# Patient Record
Sex: MALE | Race: WHITE | NOT HISPANIC OR LATINO | Employment: UNEMPLOYED | URBAN - METROPOLITAN AREA
[De-identification: names, ages, dates, MRNs, and addresses within clinical notes are randomized per-mention and may not be internally consistent; named-entity substitution may affect disease eponyms.]

---

## 2021-05-03 ENCOUNTER — HOSPITAL ENCOUNTER (EMERGENCY)
Facility: CLINIC | Age: 51
Discharge: HOME | End: 2021-05-03
Attending: NURSE PRACTITIONER | Admitting: NURSE PRACTITIONER
Payer: MEDICAID

## 2021-05-03 VITALS
OXYGEN SATURATION: 96 % | WEIGHT: 213 LBS | TEMPERATURE: 98.1 F | DIASTOLIC BLOOD PRESSURE: 123 MMHG | RESPIRATION RATE: 18 BRPM | HEART RATE: 86 BPM | SYSTOLIC BLOOD PRESSURE: 209 MMHG

## 2021-05-03 DIAGNOSIS — I10 HYPERTENSION, UNSPECIFIED TYPE: ICD-10-CM

## 2021-05-03 LAB
ANION GAP SERPL CALCULATED.3IONS-SCNC: 3 MMOL/L (ref 3–14)
BASOPHILS # BLD AUTO: 0.1 10E9/L (ref 0–0.2)
BASOPHILS NFR BLD AUTO: 1 %
BUN SERPL-MCNC: 12 MG/DL (ref 7–30)
CALCIUM SERPL-MCNC: 8.8 MG/DL (ref 8.5–10.1)
CHLORIDE SERPL-SCNC: 109 MMOL/L (ref 94–109)
CO2 SERPL-SCNC: 29 MMOL/L (ref 20–32)
CREAT SERPL-MCNC: 0.89 MG/DL (ref 0.66–1.25)
DIFFERENTIAL METHOD BLD: ABNORMAL
EOSINOPHIL # BLD AUTO: 0.2 10E9/L (ref 0–0.7)
EOSINOPHIL NFR BLD AUTO: 2.9 %
ERYTHROCYTE [DISTWIDTH] IN BLOOD BY AUTOMATED COUNT: 13.2 % (ref 10–15)
GFR SERPL CREATININE-BSD FRML MDRD: >90 ML/MIN/{1.73_M2}
GLUCOSE SERPL-MCNC: 123 MG/DL (ref 70–99)
HCT VFR BLD AUTO: 52.2 % (ref 40–53)
HGB BLD-MCNC: 18.5 G/DL (ref 13.3–17.7)
IMM GRANULOCYTES # BLD: 0 10E9/L (ref 0–0.4)
IMM GRANULOCYTES NFR BLD: 0.2 %
LYMPHOCYTES # BLD AUTO: 3.2 10E9/L (ref 0.8–5.3)
LYMPHOCYTES NFR BLD AUTO: 38.7 %
MCH RBC QN AUTO: 30.7 PG (ref 26.5–33)
MCHC RBC AUTO-ENTMCNC: 35.4 G/DL (ref 31.5–36.5)
MCV RBC AUTO: 87 FL (ref 78–100)
MONOCYTES # BLD AUTO: 0.5 10E9/L (ref 0–1.3)
MONOCYTES NFR BLD AUTO: 5.6 %
NEUTROPHILS # BLD AUTO: 4.2 10E9/L (ref 1.6–8.3)
NEUTROPHILS NFR BLD AUTO: 51.6 %
NRBC # BLD AUTO: 0 10*3/UL
NRBC BLD AUTO-RTO: 0 /100
PLATELET # BLD AUTO: 248 10E9/L (ref 150–450)
POTASSIUM SERPL-SCNC: 3.4 MMOL/L (ref 3.4–5.3)
RBC # BLD AUTO: 6.02 10E12/L (ref 4.4–5.9)
SODIUM SERPL-SCNC: 141 MMOL/L (ref 133–144)
TROPONIN I SERPL-MCNC: <0.015 UG/L (ref 0–0.04)
WBC # BLD AUTO: 8.2 10E9/L (ref 4–11)

## 2021-05-03 PROCEDURE — 80048 BASIC METABOLIC PNL TOTAL CA: CPT | Performed by: NURSE PRACTITIONER

## 2021-05-03 PROCEDURE — 93005 ELECTROCARDIOGRAM TRACING: CPT | Performed by: NURSE PRACTITIONER

## 2021-05-03 PROCEDURE — 99284 EMERGENCY DEPT VISIT MOD MDM: CPT | Mod: 25 | Performed by: NURSE PRACTITIONER

## 2021-05-03 PROCEDURE — 85025 COMPLETE CBC W/AUTO DIFF WBC: CPT | Performed by: NURSE PRACTITIONER

## 2021-05-03 PROCEDURE — 99284 EMERGENCY DEPT VISIT MOD MDM: CPT | Performed by: NURSE PRACTITIONER

## 2021-05-03 PROCEDURE — 93010 ELECTROCARDIOGRAM REPORT: CPT | Performed by: NURSE PRACTITIONER

## 2021-05-03 PROCEDURE — 84484 ASSAY OF TROPONIN QUANT: CPT | Performed by: NURSE PRACTITIONER

## 2021-05-03 RX ORDER — LOSARTAN POTASSIUM AND HYDROCHLOROTHIAZIDE 12.5; 5 MG/1; MG/1
1 TABLET ORAL DAILY
Qty: 30 TABLET | Refills: 0 | Status: SHIPPED | OUTPATIENT
Start: 2021-05-03 | End: 2021-05-03

## 2021-05-03 RX ORDER — LOSARTAN POTASSIUM AND HYDROCHLOROTHIAZIDE 12.5; 5 MG/1; MG/1
1 TABLET ORAL DAILY
Qty: 30 TABLET | Refills: 0 | Status: SHIPPED | OUTPATIENT
Start: 2021-05-03 | End: 2021-05-12

## 2021-05-03 RX ORDER — DILTIAZEM HYDROCHLORIDE 120 MG/1
120 CAPSULE, EXTENDED RELEASE ORAL DAILY
COMMUNITY
End: 2021-05-03

## 2021-05-03 ASSESSMENT — ENCOUNTER SYMPTOMS
FEVER: 0
SHORTNESS OF BREATH: 0
ABDOMINAL PAIN: 0
DIARRHEA: 0
NAUSEA: 0
CONSTIPATION: 0
PALPITATIONS: 0
COUGH: 0
DYSURIA: 0
NEUROLOGICAL NEGATIVE: 1
MUSCULOSKELETAL NEGATIVE: 1
VOMITING: 0
FATIGUE: 0
FLANK PAIN: 0
CHILLS: 0

## 2021-05-03 NOTE — ED PROVIDER NOTES
History     Chief Complaint   Patient presents with     Hypertension     HPI  Salvador Crespo is a 50 year old male with history of Htn who presents to the emergency department for evaluation of hypertension. Patient had an episode 1 week ago of sudden onset of confusion- having a party and lots of people (30+) were at his home and he could not remember why everyone was there and then became very fatigued. He then presented to emergency department in [place], [place]. Per patient he had a normal Head CT scan. He does not know what his blood work showed but states he was given several different blood pressure medications via IV and once his BP was 166 systolic he was sent home. He was prescribed Diltiazem  mg daily which he has been taking. He has been recording his BPs at home (10 times a day) that have continued to range from 160-180s systolic and 90-100s diastolic. He states he feels fine (asymptomatic). Denies chest pain or shortness of breath. Mild headache, but also reports feeling anxious. He presents here stating he is not sure what to do.  He does not have a primary care provider. He was previously living in [place]. Several years ago (7-10 years ago) he states he was told he had 3 mini strokes. He had a normal stress test. He was started on hydrochlorothiazide 25mg and then Lisinopril was added. He felt sick on Lisinopril and was put on Metoprolol and only took a few days of it and was taken off. He has stopped taking all his BP meds 3 years ago when he did not have any insurance.     Allergies:  No Known Allergies    Problem List:    There are no active problems to display for this patient.       Past Medical History:    History reviewed. No pertinent past medical history.    Past Surgical History:    History reviewed. No pertinent surgical history.    Family History:    History reviewed. No pertinent family history.    Social History:  Marital Status:  Single [1]  Social History     Tobacco Use      Smoking status: Current Every Day Smoker     Packs/day: 0.25     Types: Cigarettes     Smokeless tobacco: Never Used   Substance Use Topics     Alcohol use: Not Currently     Drug use: Yes     Types: Marijuana        Medications:    losartan-hydrochlorothiazide (HYZAAR) 50-12.5 MG tablet          Review of Systems   Constitutional: Negative for chills, fatigue and fever.   HENT: Negative for congestion.    Respiratory: Negative for cough and shortness of breath.    Cardiovascular: Negative for chest pain, palpitations and leg swelling.   Gastrointestinal: Negative for abdominal pain, constipation, diarrhea, nausea and vomiting.   Genitourinary: Negative for dysuria, flank pain and urgency.   Musculoskeletal: Negative.    Skin: Negative.    Neurological: Negative.    All other systems reviewed and are negative.      Physical Exam   BP: (!) 211/134  Pulse: 103  Temp: 98.1  F (36.7  C)  Resp: 16  Weight: 96.6 kg (213 lb)  SpO2: 99 %      Physical Exam  Constitutional:       Appearance: Normal appearance.   HENT:      Head: Normocephalic and atraumatic.      Right Ear: Tympanic membrane and ear canal normal.      Left Ear: Tympanic membrane and ear canal normal.      Nose: Nose normal.      Mouth/Throat:      Mouth: Mucous membranes are moist.   Eyes:      General: No scleral icterus.     Conjunctiva/sclera: Conjunctivae normal.   Cardiovascular:      Rate and Rhythm: Regular rhythm. Tachycardia present.      Heart sounds: No murmur.   Pulmonary:      Effort: Pulmonary effort is normal.      Breath sounds: Normal breath sounds.   Abdominal:      General: Bowel sounds are normal. There is no distension.      Palpations: Abdomen is soft.      Tenderness: There is no abdominal tenderness.   Skin:     General: Skin is warm and dry.   Neurological:      General: No focal deficit present.      Mental Status: He is alert and oriented to person, place, and time.         ED Course        Procedures               EKG  Interpretation:      Interpreted by VASYL Alcazar CNP  Time reviewed: 1310  Symptoms at time of EKG: none   Rhythm: normal sinus   Rate: normal  Axis: normal  Ectopy: none  Conduction: normal  ST Segments/ T Waves: No ST-T wave changes  Q Waves: none  Comparison to prior: No old EKG available    Clinical Impression: normal EKG           Results for orders placed or performed during the hospital encounter of 05/03/21 (from the past 24 hour(s))   CBC with platelets differential   Result Value Ref Range    WBC 8.2 4.0 - 11.0 10e9/L    RBC Count 6.02 (H) 4.4 - 5.9 10e12/L    Hemoglobin 18.5 (H) 13.3 - 17.7 g/dL    Hematocrit 52.2 40.0 - 53.0 %    MCV 87 78 - 100 fl    MCH 30.7 26.5 - 33.0 pg    MCHC 35.4 31.5 - 36.5 g/dL    RDW 13.2 10.0 - 15.0 %    Platelet Count 248 150 - 450 10e9/L    Diff Method Automated Method     % Neutrophils 51.6 %    % Lymphocytes 38.7 %    % Monocytes 5.6 %    % Eosinophils 2.9 %    % Basophils 1.0 %    % Immature Granulocytes 0.2 %    Nucleated RBCs 0 0 /100    Absolute Neutrophil 4.2 1.6 - 8.3 10e9/L    Absolute Lymphocytes 3.2 0.8 - 5.3 10e9/L    Absolute Monocytes 0.5 0.0 - 1.3 10e9/L    Absolute Eosinophils 0.2 0.0 - 0.7 10e9/L    Absolute Basophils 0.1 0.0 - 0.2 10e9/L    Abs Immature Granulocytes 0.0 0 - 0.4 10e9/L    Absolute Nucleated RBC 0.0    Basic metabolic panel   Result Value Ref Range    Sodium 141 133 - 144 mmol/L    Potassium 3.4 3.4 - 5.3 mmol/L    Chloride 109 94 - 109 mmol/L    Carbon Dioxide 29 20 - 32 mmol/L    Anion Gap 3 3 - 14 mmol/L    Glucose 123 (H) 70 - 99 mg/dL    Urea Nitrogen 12 7 - 30 mg/dL    Creatinine 0.89 0.66 - 1.25 mg/dL    GFR Estimate >90 >60 mL/min/[1.73_m2]    GFR Estimate If Black >90 >60 mL/min/[1.73_m2]    Calcium 8.8 8.5 - 10.1 mg/dL   Troponin I   Result Value Ref Range    Troponin I ES <0.015 0.000 - 0.045 ug/L       Medications - No data to display    Assessments & Plan (with Medical Decision Making)  50 year old male with  history of Hypertension-not controlled for several years. Presents today for elevated BP, asymptomatic. Stopped taking medications 3 years ago due to financial reasons/no insurance. Pt new to the area and has yet to establish primary care provider.  Evaluated at OSH 1 week ago after he had an episode of confusion. Symptoms resolved and has not returned. He had a normal head CT and labs at OSH and was started on diltiazem  mg daily.  Reports he continues to have elevated blood pressures 180s/100s.   On exam BP is elevated 211/134 on arrival.  Reports feeling anxious, but otherwise denies significant symptoms.  He thinks he might have a mild headache, but this is not persistent.  Denies chest pain.  He had previously been taking hydrochlorothiazide.  He had lisinopril added, but did not tolerate the side effects.  He also was prescribed metoprolol in the past but states he only took a few doses of this.  He is not taking any blood pressure medication for the last 3 years.  Today patient has a normal EKG.  Labs are unremarkable including normal kidney function and negative troponin.  He has remained asymptomatic.  I discussed the need for establishing primary care with patient.  We discussed changing his blood pressure medication regimen.  Given he has no symptoms I did not feel he was an hypertensive urgency or emergency to warrant aggressive management or treatment of his blood pressure in the emergency department today.  No red flags to warrant repeat head imaging.  Patient instructed as follows:  Stop Diltiazem.  Start Hyzaar (Losartan 50 mg/Hydrochlorothiazide 12.5 mg) 1 tablet daily. (start today). This was sent to [facility] in [place].  Follow-up in clinic as soon as possible to establish care.  -- Stop at the registration desk and make appointment on your way out today.  -- I also placed a primary care clinic referral.    Return to the emergency department for chest pain, severe headache, dizziness,  extremity weakness, slurred speech, facial droop, or any other symptoms of concern.    --Follow-up appointment FP clinic for recheck BP and establish care has been made for 5/10/2021 with Dr. Schaeffer.     I have reviewed the nursing notes.    I have reviewed the findings, diagnosis, plan and need for follow up with the patient.      New Prescriptions    LOSARTAN-HYDROCHLOROTHIAZIDE (HYZAAR) 50-12.5 MG TABLET    Take 1 tablet by mouth daily       Final diagnoses:   Hypertension, unspecified type       5/3/2021   [facility] EMERGENCY DEPT     Obi, Sondra Armstrong, VASYL CNP  05/03/21 [id number]

## 2021-05-03 NOTE — DISCHARGE INSTRUCTIONS
Stop Diltiazem.  Start Hyzaar (Losartan 50 mg/Hydrochlorothiazide 12.5 mg) 1 tablet daily. (start today). This was sent to [facility] in [place].  Follow-up in clinic as soon as possible to establish care.  -- Stop at the registration desk and make appointment on your way out today.  -- I also placed a primary care clinic referral.    Return to the emergency department for chest pain, severe headache, dizziness, extremity weakness, slurred speech, facial droop, or any other symptoms of concern.

## 2021-05-12 ENCOUNTER — OFFICE VISIT (OUTPATIENT)
Dept: FAMILY MEDICINE | Facility: CLINIC | Age: 51
End: 2021-05-12
Attending: NURSE PRACTITIONER
Payer: MEDICAID

## 2021-05-12 VITALS
WEIGHT: 210.6 LBS | HEIGHT: 73 IN | OXYGEN SATURATION: 97 % | RESPIRATION RATE: 18 BRPM | SYSTOLIC BLOOD PRESSURE: 158 MMHG | TEMPERATURE: 97.8 F | BODY MASS INDEX: 27.91 KG/M2 | HEART RATE: 108 BPM | DIASTOLIC BLOOD PRESSURE: 102 MMHG

## 2021-05-12 DIAGNOSIS — F17.200 TOBACCO USE DISORDER: ICD-10-CM

## 2021-05-12 DIAGNOSIS — Z86.73 HISTORY OF TIA (TRANSIENT ISCHEMIC ATTACK) AND STROKE: ICD-10-CM

## 2021-05-12 DIAGNOSIS — I10 HYPERTENSION, UNSPECIFIED TYPE: Primary | ICD-10-CM

## 2021-05-12 PROCEDURE — 99203 OFFICE O/P NEW LOW 30 MIN: CPT | Performed by: FAMILY MEDICINE

## 2021-05-12 RX ORDER — LOSARTAN POTASSIUM AND HYDROCHLOROTHIAZIDE 25; 100 MG/1; MG/1
1 TABLET ORAL DAILY
Qty: 30 TABLET | Refills: 1 | Status: SHIPPED | OUTPATIENT
Start: 2021-05-12 | End: 2021-07-02

## 2021-05-12 RX ORDER — ATORVASTATIN CALCIUM 20 MG/1
10 TABLET, FILM COATED ORAL DAILY
Qty: 30 TABLET | Refills: 0 | Status: SHIPPED | OUTPATIENT
Start: 2021-05-12 | End: 2021-06-28

## 2021-05-12 ASSESSMENT — PAIN SCALES - GENERAL: PAINLEVEL: NO PAIN (0)

## 2021-05-12 ASSESSMENT — MIFFLIN-ST. JEOR: SCORE: 1867.33

## 2021-05-12 NOTE — PROGRESS NOTES
Assessment & Plan       ICD-10-CM    1. Hypertension, unspecified type  I10 Primary Care Referral     losartan-hydrochlorothiazide (HYZAAR) 100-25 MG tablet     atorvastatin (LIPITOR) 20 MG tablet   2. History of TIA (transient ischemic attack) and stroke  Z86.73    3. Tobacco use disorder  F17.200      Salvador is here today for high blood pressure follow-up.  He is new to the clinic.  Was on medication for high blood pressure for years but stopped on his own in the last 3 years.  Was told he has TIA multiple times.  His previous care was in [facility], no medical record available to review.  His blood pressure remains to be elevated today.  He is not symptomatic.  He continues to smoke one half a pack a day, cutting down from over a pack a day.  Denied of drugs or alcohol use.  Exam today with no focal neurological deficit.    I discussed with him about the nature of the condition and the potential complications associated with uncontrolled high blood pressure.  I educated him about the goal for the blood pressure as well.  Emphasized on healthy lifestyle modification with exercising, healthy diet and weight loss.  Encouraged to avoid high caffeine/salt intake.  Recommended exercise at least 30 minutes a day, 5 days a week, more as tolerated.  Also recommended Mediterranean diet.      Encouraged to stop smoking.  Smoking cessation aid recommended but declined.  He planning to stop on his own.  Recommended basic labs but he declined for now due to lack of health insurance.  He is trying to get the health insurance, will return for physical and lab works when he has it cover.    Start him on low-dose aspirin and low-dose statin.  Potential side effect discussed.  Increase the Hyzaar to 100/25 milligrams daily.  Encouraged to monitoring his blood pressure closely, call in if it is persistently above 140/90.  Return to the clinic in 7 to 10 days for blood pressure check.  Recommend to bring in his blood  "pressure cuff for comparison.  He still has the metoprolol left at home.    Will try to get the medical record from his previous provider to review.    He was strongly recommended follow-up for physical exam when he has his health insurance.       Tobacco Cessation:   reports that he has been smoking cigarettes. He has been smoking about 0.25 packs per day. He has never used smokeless tobacco.  Tobacco Cessation Action Plan: Information offered: Patient not interested at this time    BMI:   Estimated body mass index is 27.63 kg/m  as calculated from the following:    Height as of this encounter: 1.859 m (6' 1.2\").    Weight as of this encounter: 95.5 kg (210 lb 9.6 oz).   Weight management plan: Discussed healthy diet and exercise guidelines       No follow-ups on file.    Cary Martinez Mai, MD  [facility]    Olena Franco is a 50 year old who presents for the following health issues  accompanied by his spouse:    HPI     New Patient/Transfer of Care  ED/UC Followup:    Facility:  [facility]  Date of visit: 05/03/21  Reason for visit: HTN  Current Status: Patient reports blood pressure is still running high but, he feels fine.          Salvador is here today with his wife for ER follow-up on high blood pressure.  States that he had high blood pressure in the past and was on medication for it.  He took himself of the medication for over 3 years.  About a week ago, while hosting a party of more than 30 people at home, he suddenly developed an episode of confusion that he could not remember why everyone was there and become very fatigued and tired.  He was seen in the ER in [place] initially and head CT scan was normal.  It was noted blood pressure was high.  He was put on diltiazem but his blood pressure remains to be high at home.  No chest pain or shortness of breath.  His headache was minimal at that time which now has resolved.  He was seen in the ER again on 5/3/21 because his blood pressure " "remained to be high at home.  He checked his blood pressure multiple times a day.  In the ER, work-up showed negative/normal troponin, and CBC except hemoglobin was high.  He was discharged home with his Hyzaar 50/12.5; stopped the diltiazem.  He been taking the medications as prescribed.  His blood pressure remained to be elevated as well.  No headache or dizziness.  No acute change in his vision.  No leg swelling, orthopnea, dyspnea.  No chest pain or shortness of breath.    Stated that he was diagnosed with mini stroke 3 times when he was in [place].  He was on multiple medication but took himself off all the medication for over 3 years.  Continues to smoke about half pack per day, cutting down from over a pack a day.  Not taking aspirin or statin.  Not exercising.  Denied of drugs use showed or excessive alcohol intake.    His wife is concerned about his anxiety.  He denied of having a problem with it.  Currently he does not have health insurance and he does not want to address it today.  He is trying to get health insurance and will come back in the near future for physical and follow-up on anxiety once he gets it covered.    Review of Systems   Constitutional, HEENT, cardiovascular, pulmonary, gi and gu systems are negative, except as otherwise noted.      Objective    BP (!) 158/102   Pulse 108   Temp 97.8  F (36.6  C) (Temporal)   Resp 18   Ht 1.859 m (6' 1.2\")   Wt 95.5 kg (210 lb 9.6 oz)   SpO2 97%   BMI 27.63 kg/m    Body mass index is 27.63 kg/m .  Physical Exam   GENERAL: healthy, alert and no distress.  Speaking in full sentences.  NECK: Supple, no lymphadenopathy or thyromegaly.  No JV distention.  No carotid bruits.  RESP: lungs clear to auscultation - no rales, rhonchi or wheezes  CV: regular rate and rhythm, normal S1 S2, no S3 or S4, no murmur, click or rub, no peripheral edema  ABDOMEN: soft, nontender, nondistended, no palpable masses or organomegaly with normal bowel sound.  MS: no " gross musculoskeletal defects noted, no edema.  No focal weakness.  NEURO: Normal strength and tone, mentation intact and speech normal.  Cranial nerves II through XII intact.  DTR +2 throughout.  No focal neurological deficit.  PSYCH: mentation appears normal, affect normal/bright    No results found for any visits on 05/12/21.    Reviewed the medical record from the emergency room.  Labs on 5/3/21showed normal CBC except hemoglobin of 18.5 which is elevated, normal BMP and troponin level.  EKG showed normal sinus rhythm.

## 2021-05-19 ENCOUNTER — NURSE TRIAGE (OUTPATIENT)
Dept: NURSING | Facility: CLINIC | Age: 51
End: 2021-05-19

## 2021-05-19 ENCOUNTER — ALLIED HEALTH/NURSE VISIT (OUTPATIENT)
Dept: FAMILY MEDICINE | Facility: CLINIC | Age: 51
End: 2021-05-19

## 2021-05-19 VITALS — DIASTOLIC BLOOD PRESSURE: 101 MMHG | HEART RATE: 97 BPM | SYSTOLIC BLOOD PRESSURE: 170 MMHG

## 2021-05-19 DIAGNOSIS — I10 HYPERTENSION, UNSPECIFIED TYPE: Primary | ICD-10-CM

## 2021-05-19 PROBLEM — F17.200 TOBACCO USE DISORDER: Status: ACTIVE | Noted: 2021-05-19

## 2021-05-19 PROBLEM — Z86.73 HISTORY OF TIA (TRANSIENT ISCHEMIC ATTACK) AND STROKE: Status: ACTIVE | Noted: 2021-05-19

## 2021-05-19 PROCEDURE — 99207 PR NO CHARGE NURSE ONLY: CPT

## 2021-05-19 NOTE — PROGRESS NOTES
Salvador Crespo is a 50 year old patient who comes in today for a Blood Pressure check.  Initial BP:  BP (!) 170/101   Pulse 97      97  Disposition: provider notified while patient in the clinic and results routed to provider. Patient understands medications will be sent to pharmacy an to continue taking all as prescribe, including losartan.     [facility] for refills .  Merly Tracy MA on 5/19/2021 at 11:54 AM

## 2021-05-19 NOTE — TELEPHONE ENCOUNTER
Girlfriend calling wanting to know the plan.    At this point there is no plan.    Need to wait.    June Sood RN  [facility] Nurse Advisor  2:14 PM  5/19/2021    Additional Information    Caller has medication question only, adult not sick, and triager answers question    Protocols used: MEDICATION QUESTION CALL-A-[place]

## 2021-05-20 ENCOUNTER — NURSE TRIAGE (OUTPATIENT)
Dept: NURSING | Facility: CLINIC | Age: 51
End: 2021-05-20

## 2021-05-20 DIAGNOSIS — I10 HYPERTENSION, UNSPECIFIED TYPE: Primary | ICD-10-CM

## 2021-05-20 RX ORDER — METOPROLOL SUCCINATE 25 MG/1
25 TABLET, EXTENDED RELEASE ORAL DAILY
Qty: 90 TABLET | Refills: 1 | Status: SHIPPED | OUTPATIENT
Start: 2021-05-20 | End: 2021-10-01

## 2021-05-20 NOTE — TELEPHONE ENCOUNTER
Patient returned call to clinic, reporting that he had the wrong dose of Metoprolol at home and will need the Metoprolol sent to [facility] pharmacy.     Spoke with , he would like to give Metoprolol 25mg daily NOT 50mg daily.  Dr. Schaeffer did request prices as patient is paying out of pocket.      Metoprolol extended release 90 day supply will be $24  Metoprolol BID 90 day supply will be $10.    Patient will do extended release and take Metoprolol 25mg daily.     Script sent to [facility].     Lindsay Bah RN

## 2021-05-20 NOTE — TELEPHONE ENCOUNTER
RN triage   Call from pt   Pt has questions about medications--   Pt states he was told that he would get a prescription yesterday -- and pharmacy does not have a new prescription for pt from yesterday    Pt has not picked up prescriptions from last week    Currently pt is taking :    hyzaar  50/ 12.5 --- Q day -- has not started 100/25    Pt has not started atorvastatin     Pt states he has 100 mg metoprolol at home - -that  in 2018 -- has not taken any of these  --- pt states he got nausea in 2018 when he took them     Per clinic note from yesterday -- 50 mg metoprolol was prescribed/advised     Pt needs metoprolol presciption  -- send to [facility] pharmacy   And please clarify dosing for metoprolol     Rayna Steward RN  [facility]  Triage Nurse Advisor        Reason for Disposition    Request for URGENT new prescription or refill of 'essential' medication (i.e., likelihood of harm to patient if not taken) and triager unable to fill per department policy    Protocols used: MEDICATION QUESTION CALL-A-OH

## 2021-05-20 NOTE — PROGRESS NOTES
Please let patient know that I want him to continue with the Hyzaar 100/25 daily and start the Lipitor as soon as possible.      His blood pressure remains to be high.  I recommend him to restart the metoprolol that he still has at home 50 mg daily in addition to the Hyzaar.  Recheck the blood pressure in 10-14 days.     It means that he is now taking 3 pills a day - Hyzaar, Lipitor and metoprolol.  If he needs a refill for metoprolol please let me know.  I recall he told me that he had a lot of metoprolol 50 mg dose left at home and I would recommend to restart it.  Thank you

## 2021-05-26 ENCOUNTER — ALLIED HEALTH/NURSE VISIT (OUTPATIENT)
Dept: FAMILY MEDICINE | Facility: CLINIC | Age: 51
End: 2021-05-26

## 2021-05-26 VITALS — SYSTOLIC BLOOD PRESSURE: 170 MMHG | DIASTOLIC BLOOD PRESSURE: 108 MMHG

## 2021-05-26 DIAGNOSIS — I10 HYPERTENSION, UNSPECIFIED TYPE: Primary | ICD-10-CM

## 2021-05-26 PROCEDURE — 99207 PR NO CHARGE NURSE ONLY: CPT

## 2021-05-26 NOTE — PROGRESS NOTES
Since the last visit last week patient has been taking Lipitor 20 mg (10mg) and Metoprolol 25 mg daily and was suppose to increase his Hyzaar from 50-12.5mg to 100-25 mg but was told to finish the 50-12.5mg first before starting the 100-25mg didn't understand he was suppose to take two of those till gone.     Ernestina [place] MA 5/26/2021      Salvador Crespo is a 50 year old patient who comes in today for a Blood Pressure check.  Initial BP:  BP (!) 170/108      Data Unavailable  Disposition: provider notified while patient in the clinic and results routed to provider    Spoke to Dr Schaeffer as patient wasnt taking meds the way he should have been so he is to take them as prescribed and he is coming back next Wednesday for another BP check.

## 2021-05-27 NOTE — PROGRESS NOTES
I called pt and informed him of the below msg and he stated he mis understood and thought you said to finish his other dose cause he only had like 8 tabs left. He is taking it correctly and has an appt on 6/2 to recheck it.  Tori Briseno MA

## 2021-05-27 NOTE — PROGRESS NOTES
My instruction was clearly stated on 5/19/21 that he needs to take the Hyzaar 100/25, metoprolol and Lipitor.  I am not sure why the message did not get to him and please find out why it was missed.  He needs to be on the Hyzaar 100/25, metoprolol 25 and Lipitor and recheck the blood pressure in a week to 10-day.  Please document clearly the instruction was given to the patient - have him repeat the instruction when this message is given to him.  Thank you

## 2021-06-02 ENCOUNTER — ALLIED HEALTH/NURSE VISIT (OUTPATIENT)
Dept: FAMILY MEDICINE | Facility: CLINIC | Age: 51
End: 2021-06-02

## 2021-06-02 VITALS — SYSTOLIC BLOOD PRESSURE: 142 MMHG | DIASTOLIC BLOOD PRESSURE: 100 MMHG

## 2021-06-02 DIAGNOSIS — I10 HYPERTENSION, UNSPECIFIED TYPE: Primary | ICD-10-CM

## 2021-06-02 PROCEDURE — 99207 PR NO CHARGE NURSE ONLY: CPT

## 2021-06-02 NOTE — PROGRESS NOTES
Salvador Crespo is a 50 year old patient who comes in today for a Blood Pressure check.  Initial BP:  BP (!) 142/100      Data Unavailable  Disposition: provider notified while patient in the clinic spoke to Yelitza as Dr Schaeffer is out this week. Yelitza stated we should give his Hyzaar more time to work. And too follow up with Laury in a few weeks but patient informed me that he doesn't have insurance right now and that's why Laury is doing the Nurse only appts. Patient is hoping he can do an float only appt in a few weeks and not see Laury but if he has to see him he will we just need to let him know after pcp returns to review this. Also patient stated the meds make him feel icky in the mornings Yelitza stated he could take them at night if he wants too patient is aware. unsure what he will do for this and results routed to provider

## 2021-06-06 ENCOUNTER — HEALTH MAINTENANCE LETTER (OUTPATIENT)
Age: 51
End: 2021-06-06

## 2021-06-28 DIAGNOSIS — I10 HYPERTENSION, UNSPECIFIED TYPE: ICD-10-CM

## 2021-06-28 RX ORDER — ATORVASTATIN CALCIUM 20 MG/1
10 TABLET, FILM COATED ORAL DAILY
Qty: 30 TABLET | Refills: 0 | Status: SHIPPED | OUTPATIENT
Start: 2021-06-28 | End: 2021-09-13

## 2021-06-28 NOTE — TELEPHONE ENCOUNTER
Routing refill request to provider for review/approval because:  Labs out of range:  LDL    Lindsay Bah RN

## 2021-06-29 ENCOUNTER — ALLIED HEALTH/NURSE VISIT (OUTPATIENT)
Dept: FAMILY MEDICINE | Facility: CLINIC | Age: 51
End: 2021-06-29

## 2021-06-29 VITALS
TEMPERATURE: 98 F | BODY MASS INDEX: 27.83 KG/M2 | HEIGHT: 73 IN | DIASTOLIC BLOOD PRESSURE: 74 MMHG | RESPIRATION RATE: 18 BRPM | SYSTOLIC BLOOD PRESSURE: 132 MMHG | HEART RATE: 72 BPM | WEIGHT: 210 LBS

## 2021-06-29 DIAGNOSIS — I10 HYPERTENSION, UNSPECIFIED TYPE: Primary | ICD-10-CM

## 2021-06-29 PROCEDURE — 99207 PR NO CHARGE NURSE ONLY: CPT

## 2021-06-29 ASSESSMENT — MIFFLIN-ST. JEOR: SCORE: 1861.43

## 2021-06-29 ASSESSMENT — PAIN SCALES - GENERAL: PAINLEVEL: NO PAIN (0)

## 2021-07-01 DIAGNOSIS — I10 HYPERTENSION, UNSPECIFIED TYPE: ICD-10-CM

## 2021-07-02 RX ORDER — LOSARTAN POTASSIUM AND HYDROCHLOROTHIAZIDE 25; 100 MG/1; MG/1
TABLET ORAL
Qty: 30 TABLET | Refills: 0 | Status: SHIPPED | OUTPATIENT
Start: 2021-07-02 | End: 2021-07-09

## 2021-07-02 RX ORDER — ATORVASTATIN CALCIUM 20 MG/1
TABLET, FILM COATED ORAL
Qty: 30 TABLET | Refills: 0 | OUTPATIENT
Start: 2021-07-02

## 2021-07-02 NOTE — TELEPHONE ENCOUNTER
Prescription approved per [facility] Refill Protocol. Losartan-hydrochlorothiazide, refused atorvastatin as duplicate see script 6/28/2021 dispense 30 tabs no refills.        Indu Barajas RN  [facility]

## 2021-07-09 ENCOUNTER — OFFICE VISIT (OUTPATIENT)
Dept: FAMILY MEDICINE | Facility: CLINIC | Age: 51
End: 2021-07-09
Payer: MEDICAID

## 2021-07-09 VITALS
RESPIRATION RATE: 20 BRPM | SYSTOLIC BLOOD PRESSURE: 152 MMHG | BODY MASS INDEX: 28.41 KG/M2 | HEART RATE: 91 BPM | WEIGHT: 214.4 LBS | OXYGEN SATURATION: 97 % | HEIGHT: 73 IN | TEMPERATURE: 97.9 F | DIASTOLIC BLOOD PRESSURE: 94 MMHG

## 2021-07-09 DIAGNOSIS — Z86.73 HISTORY OF TIA (TRANSIENT ISCHEMIC ATTACK) AND STROKE: ICD-10-CM

## 2021-07-09 DIAGNOSIS — R07.9 CHEST PAIN, UNSPECIFIED TYPE: ICD-10-CM

## 2021-07-09 DIAGNOSIS — I10 HYPERTENSION, UNSPECIFIED TYPE: ICD-10-CM

## 2021-07-09 DIAGNOSIS — Z23 NEED FOR VACCINATION: ICD-10-CM

## 2021-07-09 DIAGNOSIS — N52.9 ERECTILE DYSFUNCTION, UNSPECIFIED ERECTILE DYSFUNCTION TYPE: ICD-10-CM

## 2021-07-09 DIAGNOSIS — H57.9 EYE PRESSURE: ICD-10-CM

## 2021-07-09 DIAGNOSIS — Z00.00 ROUTINE GENERAL MEDICAL EXAMINATION AT A HEALTH CARE FACILITY: Primary | ICD-10-CM

## 2021-07-09 DIAGNOSIS — F17.200 TOBACCO USE DISORDER: ICD-10-CM

## 2021-07-09 DIAGNOSIS — H61.23 BILATERAL IMPACTED CERUMEN: ICD-10-CM

## 2021-07-09 LAB
ALBUMIN SERPL-MCNC: 4 G/DL (ref 3.4–5)
ALP SERPL-CCNC: 86 U/L (ref 40–150)
ALT SERPL W P-5'-P-CCNC: 56 U/L (ref 0–70)
ANION GAP SERPL CALCULATED.3IONS-SCNC: 1 MMOL/L (ref 3–14)
AST SERPL W P-5'-P-CCNC: 21 U/L (ref 0–45)
BILIRUB SERPL-MCNC: 0.6 MG/DL (ref 0.2–1.3)
BUN SERPL-MCNC: 13 MG/DL (ref 7–30)
CALCIUM SERPL-MCNC: 9.2 MG/DL (ref 8.5–10.1)
CHLORIDE SERPL-SCNC: 103 MMOL/L (ref 94–109)
CHOLEST SERPL-MCNC: 152 MG/DL
CO2 SERPL-SCNC: 37 MMOL/L (ref 20–32)
CREAT SERPL-MCNC: 0.95 MG/DL (ref 0.66–1.25)
GFR SERPL CREATININE-BSD FRML MDRD: >90 ML/MIN/{1.73_M2}
GLUCOSE SERPL-MCNC: 121 MG/DL (ref 70–99)
HDLC SERPL-MCNC: 35 MG/DL
LDLC SERPL CALC-MCNC: 81 MG/DL
NONHDLC SERPL-MCNC: 117 MG/DL
POTASSIUM SERPL-SCNC: 3 MMOL/L (ref 3.4–5.3)
PROT SERPL-MCNC: 7.8 G/DL (ref 6.8–8.8)
PSA SERPL-ACNC: 0.73 UG/L (ref 0–4)
SODIUM SERPL-SCNC: 141 MMOL/L (ref 133–144)
TRIGL SERPL-MCNC: 182 MG/DL

## 2021-07-09 PROCEDURE — 80053 COMPREHEN METABOLIC PANEL: CPT | Performed by: FAMILY MEDICINE

## 2021-07-09 PROCEDURE — 99214 OFFICE O/P EST MOD 30 MIN: CPT | Mod: 25 | Performed by: FAMILY MEDICINE

## 2021-07-09 PROCEDURE — 99396 PREV VISIT EST AGE 40-64: CPT | Mod: 25 | Performed by: FAMILY MEDICINE

## 2021-07-09 PROCEDURE — 90715 TDAP VACCINE 7 YRS/> IM: CPT | Performed by: FAMILY MEDICINE

## 2021-07-09 PROCEDURE — G0103 PSA SCREENING: HCPCS | Performed by: FAMILY MEDICINE

## 2021-07-09 PROCEDURE — 86803 HEPATITIS C AB TEST: CPT | Performed by: FAMILY MEDICINE

## 2021-07-09 PROCEDURE — 90472 IMMUNIZATION ADMIN EACH ADD: CPT | Performed by: FAMILY MEDICINE

## 2021-07-09 PROCEDURE — 69209 REMOVE IMPACTED EAR WAX UNI: CPT | Performed by: FAMILY MEDICINE

## 2021-07-09 PROCEDURE — 90732 PPSV23 VACC 2 YRS+ SUBQ/IM: CPT | Performed by: FAMILY MEDICINE

## 2021-07-09 PROCEDURE — 36415 COLL VENOUS BLD VENIPUNCTURE: CPT | Performed by: FAMILY MEDICINE

## 2021-07-09 PROCEDURE — 90471 IMMUNIZATION ADMIN: CPT | Performed by: FAMILY MEDICINE

## 2021-07-09 PROCEDURE — 93000 ELECTROCARDIOGRAM COMPLETE: CPT | Performed by: FAMILY MEDICINE

## 2021-07-09 PROCEDURE — 80061 LIPID PANEL: CPT | Performed by: FAMILY MEDICINE

## 2021-07-09 RX ORDER — SILDENAFIL 25 MG/1
25-50 TABLET, FILM COATED ORAL DAILY PRN
Qty: 30 TABLET | Refills: 3 | Status: SHIPPED | OUTPATIENT
Start: 2021-07-09 | End: 2021-10-01

## 2021-07-09 RX ORDER — AMLODIPINE BESYLATE 5 MG/1
5 TABLET ORAL DAILY
Qty: 30 TABLET | Refills: 1 | Status: SHIPPED | OUTPATIENT
Start: 2021-07-09 | End: 2021-09-08

## 2021-07-09 RX ORDER — LOSARTAN POTASSIUM AND HYDROCHLOROTHIAZIDE 25; 100 MG/1; MG/1
1 TABLET ORAL DAILY
Qty: 90 TABLET | Refills: 1 | Status: SHIPPED | OUTPATIENT
Start: 2021-07-09 | End: 2022-02-21

## 2021-07-09 ASSESSMENT — MIFFLIN-ST. JEOR: SCORE: 1881.26

## 2021-07-09 NOTE — PROGRESS NOTES
"SUBJECTIVE:   CC: Salvador Crespo is an 50 year old male who presents for preventative health visit.       Patient has been advised of split billing requirements and indicates understanding: No  HPI    Hypertension: He presents for follow up of hypertension.  He does check blood pressure  regularly outside of the clinic. Outside blood pressures have been over 140/90. He does not follow a low salt diet.      Migraines:   Since the patient's last clinic visit, headaches are: worsened  The patient is getting headaches:  Daily  He is able to do normal daily activities when he has a migraine.  The patient is taking the following rescue/relief medications:  Ibuprofen (Advil, Motrin) and Tylenol   Patient states \"I get some relief\" from the rescue/relief medications.   The patient is taking the following medications to prevent migraines:  No medications to prevent migraines  In the past 4 weeks, the patient has gone to an Urgent Care or Emergency Room 0 times times due to headaches.     He eats 2-3 servings of fruits and vegetables daily.He consumes 4 sweetened beverage(s) daily.He exercises with enough effort to increase his heart rate 10 to 19 minutes per day.  He exercises with enough effort to increase his heart rate 6 days per week.   He is taking medications regularly.    Salvador is here today for his general physical with several was concerns.    1.  First is to follow-up on his high blood pressure for which he has had for years.  He was off the medication by choice for couple years, but recently restarted and has been taking the Hyzaar and metoprolol with no side effect. Blood pressure at home has been running low 130s-140s/80s.  No headache or dizziness.  No leg swelling, orthopnea or dyspnea.  Been having the chest pain on and off, more noticeable at the end of the day.  Achy pressure pain mainly on the left side that lasts up 20-30 minutes, no radiation to neck shoulder or arm.  Happened randomly, even at rest and " "not worse with exertion.  No associated diaphoresis but at times feels little shortness of breath.  No history of heart disease.  Stated that he has had multiple stress echocardiogram when he was in [place] and they were normal.  Denied of heartburn symptoms.  No coughing.  No history of heart disease although he was told he had an \"enlarged heart with occasional irregular heartbeat\" before.  He thinks the pain is related to his heartburn.    2.  He has a history of TIA multiple times.  Recently he restarted the Lipitor, aspirin and working on controlling his BP.  Still smokes 1/2 pack a day, been cutting back and used to smoke over 1 ppd.  No headache or dizziness.  Been feeling a lot of pressure behind his left eyes which also has been watery.  No runny nose, nasal congestions or sinus pain/pressure.  Rarely, he would feel the pressure radiates up to the left head.  No double vision vision or blurred vision.  Also feels pressure in the left eye with eyes movement at times.  Never been diagnosed with glaucoma.    3.  He also would like something for erectile dysfunction which he has had for several years.  He had trouble with both initiating and maintaining.  It is started affecting his relationship.  Never tried medication before.  Not on nitro or any medication for prostate.    4.  Otherwise, he is doing well. No major medical care or procedure done since the last physical many years ago. No N/V/D/C. No problem with urination or abdominal pain.  Denied of STD risks.  No leg swelling or pain.  Not exercising. Social drinker but no drug use.  Feels safe at home and at work-lives with his wife.  No weight change and denied of being depressed. No other concern today      Today's PHQ-2 Score:   PHQ-2 ( 1999 [employer]) 7/9/2021   Q1: Little interest or pleasure in doing things 0   Q2: Feeling down, depressed or hopeless 0   PHQ-2 Score 0   Q1: Little interest or pleasure in doing things Not at all   Q2: Feeling down, " depressed or hopeless Not at all   PHQ-2 Score 0       Abuse: Current or Past(Physical, Sexual or Emotional)- No  Do you feel safe in your environment? Yes    Have you ever done Advance Care Planning? (For example, a Health Directive, POLST, or a discussion with a medical provider or your loved ones about your wishes): Yes, patient states has an Advance Care Planning document and will bring a copy to the clinic.    Social History     Tobacco Use     Smoking status: Current Every Day Smoker     Packs/day: 0.50     Types: Cigarettes     Smokeless tobacco: Never Used   Substance Use Topics     Alcohol use: Not Currently     If you drink alcohol do you typically have >3 drinks per day or >7 drinks per week? No      No flowsheet data found.    Last PSA: No results found for: PSA    Reviewed orders with patient. Reviewed health maintenance and updated orders accordingly - Yes  BP Readings from Last 3 Encounters:   07/09/21 (!) 152/94   06/29/21 132/74   06/02/21 (!) 142/100    Wt Readings from Last 3 Encounters:   07/09/21 97.3 kg (214 lb 6.4 oz)   06/29/21 95.3 kg (210 lb)   05/12/21 95.5 kg (210 lb 9.6 oz)                  Patient Active Problem List   Diagnosis     Hypertension, unspecified type     History of TIA (transient ischemic attack) and stroke     Tobacco use disorder     Erectile dysfunction, unspecified erectile dysfunction type     Past Surgical History:   Procedure Laterality Date     NO HISTORY OF SURGERY         Social History     Tobacco Use     Smoking status: Current Every Day Smoker     Packs/day: 0.50     Types: Cigarettes     Smokeless tobacco: Never Used   Substance Use Topics     Alcohol use: Not Currently     Family History   Problem Relation Age of Onset     Unknown/Adopted Mother      Unknown/Adopted Father      Unknown/Adopted Maternal Grandmother      Unknown/Adopted Maternal Grandfather      Unknown/Adopted Paternal Grandmother      Unknown/Adopted Paternal Grandfather      Aneurysm Brother  "         at 16     No Known Problems Daughter      No Known Problems Son      No Known Problems Son          Current Outpatient Medications   Medication Sig Dispense Refill     amLODIPine (NORVASC) 5 MG tablet Take 1 tablet (5 mg) by mouth daily 30 tablet 1     aspirin (ASA) 81 MG EC tablet Take 1 tablet (81 mg) by mouth daily 100 tablet 1     atorvastatin (LIPITOR) 20 MG tablet Take 0.5 tablets (10 mg) by mouth daily 30 tablet 0     losartan-hydrochlorothiazide (HYZAAR) 100-25 MG tablet Take 1 tablet by mouth daily 90 tablet 1     metoprolol succinate ER (TOPROL-XL) 25 MG 24 hr tablet Take 1 tablet (25 mg) by mouth daily 90 tablet 1     sildenafil (VIAGRA) 25 MG tablet Take 1-2 tablets (25-50 mg) by mouth daily as needed (sexually activities) 30 tablet 3     No Known Allergies    Reviewed and updated as needed this visit by clinical staff  Tobacco  Allergies  Meds  Problems  Med Hx  Surg Hx  Fam Hx  Soc Hx          Reviewed and updated as needed this visit by Provider  Tobacco  Allergies   Problems  Med Hx  Surg Hx  Fam Hx  Soc Hx       Past Medical History:   Diagnosis Date     Cerebral artery occlusion with cerebral infarction (H)     three TIA     Heart disease     enlarged heart     Hypertension       Past Surgical History:   Procedure Laterality Date     NO HISTORY OF SURGERY         Review of Systems  Please see subjective otherwise the complete review of system was negative     OBJECTIVE:   BP (!) 152/94   Pulse 91   Temp 97.9  F (36.6  C) (Temporal)   Resp 20   Ht 1.854 m (6' 0.99\")   Wt 97.3 kg (214 lb 6.4 oz)   SpO2 97%   BMI 28.29 kg/m      Physical Exam   GENERAL: healthy, alert and no distress  EYES: Eyes grossly normal to inspection, PERRL and conjunctivae and sclerae normal.  No nystagmus.  All 4 visual fields are intact  HENT: Both ears are impacted with cerumen.  Post irrigation exam showed ear canals and TM's normal.  Nares are non-congested. Oropharynx is pink and moist. " No tender with palpation to the sinuses.  NECK: no adenopathy, supple, no lymphadenopathy or thyromegaly.  No tender with palpation to the cervical spine or its paraspinous muscle.  RESP: lungs clear to auscultation - no rales, rhonchi or wheezes  CV: regular rate and rhythm, no murmur.  ABDOMEN: soft, nondistended, nontender, no palpable masses or organomegaly with normal bowel sounds.  MS: no gross musculoskeletal defects noted, no edema.  Walk with no limping, normal gait.  All 4 extremities are equally in strength. Ankle, knees, hips, shoulders, elbows and wrists exams normal.  Normal fine motor skills on fingers.  Back is straight, no lordosis or scoliosis.  No tender with palpation.  SKIN: no suspicious lesions or rashes  NEURO: Normal strength and tone, mentation intact and speech normal.  Cranial nerves II through XII intact, DTR +2 throughout, no focal neurological deficit.  PSYCH: mentation appears normal, affect normal/bright.  Thoughts intact, no hallucination.  No suicidal or homicidal ideation.  LYMPH: no cervical, supraclavicular or axillary adenopathy.   (male): Offered but declined.  He has no concern about it      Diagnostic Test Results:  Labs reviewed in Epic  Results for orders placed or performed in visit on 07/09/21   Hepatitis C Screen Reflex to HCV RNA Quant and Genotype     Status: None   Result Value Ref Range    Hepatitis C Antibody Nonreactive NR^Nonreactive   Lipid panel reflex to direct LDL Fasting     Status: Abnormal   Result Value Ref Range    Cholesterol 152 <200 mg/dL    Triglycerides 182 (H) <150 mg/dL    HDL Cholesterol 35 (L) >39 mg/dL    LDL Cholesterol Calculated 81 <100 mg/dL    Non HDL Cholesterol 117 <130 mg/dL   Comprehensive metabolic panel (BMP + Alb, Alk Phos, ALT, AST, Total. Bili, TP)     Status: Abnormal   Result Value Ref Range    Sodium 141 133 - 144 mmol/L    Potassium 3.0 (L) 3.4 - 5.3 mmol/L    Chloride 103 94 - 109 mmol/L    Carbon Dioxide 37 (H) 20 - 32  mmol/L    Anion Gap 1 (L) 3 - 14 mmol/L    Glucose 121 (H) 70 - 99 mg/dL    Urea Nitrogen 13 7 - 30 mg/dL    Creatinine 0.95 0.66 - 1.25 mg/dL    GFR Estimate >90 >60 mL/min/[1.73_m2]    GFR Estimate If Black >90 >60 mL/min/[1.73_m2]    Calcium 9.2 8.5 - 10.1 mg/dL    Bilirubin Total 0.6 0.2 - 1.3 mg/dL    Albumin 4.0 3.4 - 5.0 g/dL    Protein Total 7.8 6.8 - 8.8 g/dL    Alkaline Phosphatase 86 40 - 150 U/L    ALT 56 0 - 70 U/L    AST 21 0 - 45 U/L   PSA, screen     Status: None   Result Value Ref Range    PSA 0.73 0 - 4 ug/L     EKG: Sinus rhythm with ST depression at lead II and V6.  Unchanged from EKG on May 2021    ASSESSMENT/PLAN:   1. Routine general medical examination at a health care facility  Overall Salvador is doing well.  Received the Tdap and Pneumovax today, declined shingle and Covid vaccination.  Discussed about safety issue, healthy diet, exercising, weight management, good sleeping hygiene, advanced directive care, falling prevention, safe sex and STD prevention, and depression prevention. Recommended daily exercise for at least 30 minutes.  Emphasized on healthy diet with adequate fluid intake and resting. Feels safe .  Follow in 1 year, earlier as needed.  Labs as ordered below.      Discussed with him about options for colon cancer screening which include colonoscopy, Cologuard or FIT test.  Pros and cons of each option discussed.  He preferred FIT test.  He understands that positive FIT test will need further evaluation with colonoscopy.  He also informed that FIT is to be done annually.     Discussed with him about the pros and cons of prostate screening cancer with PSA.  Also educated about the potential false positive which may require unnecessary work-up.  He was informed of negative/normal PSA leve does not rule out prostate cancer completely although it is very unlikely.  He understands and is willing to take the risk.      All of his questions were answered.    - PSA, screen    2.  "Hypertension, unspecified type  Not controlled and his BP is high today.  Been taking the metoprolol and Hyzaar as prescribed.  Comorbidity include TIA, hyperlipidemia and tobacco smoking.  Emphasize on the importance of controlling his blood pressure and educated him about the potential long and short term complications associated with uncontrolled HTN as well as the goal for his blood pressure.  Will continue with the metoprolol and Hyzaar at the current doses.  Added amlodipine.  Potential side effect discussed.  Discussed about healthy diet, daily exercise and weight loss as above.  Encouraged to avoid high salt/caffeine intake.  Lab as ordered.  Encourage to increase physical activities and avoid high salt diet.  Follow up in 1 to 2 weeks for blood pressure check    - Lipid panel reflex to direct LDL Fasting  - Comprehensive metabolic panel (BMP + Alb, Alk Phos, ALT, AST, Total. Bili, TP)  - amLODIPine (NORVASC) 5 MG tablet; Take 1 tablet (5 mg) by mouth daily  Dispense: 30 tablet; Refill: 1  - losartan-hydrochlorothiazide (HYZAAR) 100-25 MG tablet; Take 1 tablet by mouth daily  Dispense: 90 tablet; Refill: 1      3. Chest pain, unspecified type  Been having the chest pain which is quite nonspecific.  EKG showed no acute change or concerned finding.  Multiple risk factors which include high blood pressure, high cholesterol and tobacco smoking.  Stated he was told he had \" enlarged heart with irregular heartbeat occasionally\".  Will send for stress echocardiogram.  Will try to get his blood pressure better control as above.  Encouraged to stop smoking.  Continue with statin and aspirin.  Call in or follow-up if the symptoms persist, get worse or if any concern.    - EKG 12-lead complete w/read - Clinics    4. History of TIA (transient ischemic attack) and stroke  Cholesterol level looked pretty good today.  Will continue with Lipitor and aspirin for long term.  Will try to get the blood pressure better " controlled as above.  He was strongly encouraged to stop smoking.  Symptoms that need to be seen or call in discussed.    - Lipid panel reflex to direct LDL Fasting  - aspirin (ASA) 81 MG EC tablet; Take 1 tablet (81 mg) by mouth daily  Dispense: 100 tablet; Refill: 1  - Comprehensive metabolic panel (BMP + Alb, Alk Phos, ALT, AST, Total. Bili, TP)    5. Eye pressure  His eye exam was pretty normal today.  And concern of glaucoma.  Refer patient to ophthalmology for further evaluation.    6. Erectile dysfunction, unspecified erectile dysfunction type  Discussed with him about the nature of the condition.  He has it for couple years and it is getting worse and is affecting his relationship.  Encouraged to stop smoking.  Denied of excessive alcohol intake.  Not taking nitro or medication for prostate.  Will have him start the Viagra.  Potential side effect discussed.  He wias instructed to go to the ER if develops painful erections or erection the last more than 3 to 4 hours.    - sildenafil (VIAGRA) 25 MG tablet; Take 1-2 tablets (25-50 mg) by mouth daily as needed (sexually activities)  Dispense: 30 tablet; Refill: 3    7. Tobacco use disorder  Salvador has been smoking for years.  Discussed with him about the long and short term consequences of tobacco smoking.  Discussed with him about different options for smoking cessation aids.  He is not ready to quit smoking at this time.  Encourage to let me know when he is ready to quit.  In a mean time, I encourage him to reduce to amount of cigarrets smoke as much as possible.  All of his questions were answered.     8. Bilateral impacted cerumen  The cerumen was successfully irrigated and he tolerated the procedure well.  Avoid Q-Tip.  Inform him that his ear canals can be irritated from the irrigation.  Call in if increase pain, develop drainage or if has any concern.       - REMOVE IMPACTED CERUMEN    9. Need for vaccination        Patient has been advised of split  "billing requirements and indicates understanding: Yes  COUNSELING:   Reviewed preventive health counseling, as reflected in patient instructions       Regular exercise       Healthy diet/nutrition       Vision screening       Immunizations    Vaccinated for: Pneumococcal and TDAP             Aspirin prophylaxis        Alcohol Use        Safe sex practices/STD prevention       Consider Hep C screening for all patients one time for ages 18-79 years       Colon cancer screening       Prostate cancer screening       Osteoporosis prevention/bone health       One time pneumovax for smokers       Advance Care Planning    Estimated body mass index is 28.29 kg/m  as calculated from the following:    Height as of this encounter: 1.854 m (6' 0.99\").    Weight as of this encounter: 97.3 kg (214 lb 6.4 oz).     Weight management plan: Discussed healthy diet and exercise guidelines    He reports that he has been smoking cigarettes. He has been smoking about 0.50 packs per day. He has never used smokeless tobacco.  Tobacco Cessation Action Plan:   Information offered: Patient not interested at this time      Counseling Resources:  ATP IV Guidelines  Pooled Cohorts Equation Calculator  FRAX Risk Assessment  ICSI Preventive Guidelines  Dietary Guidelines for Americans, 2010  USDA's MyPlate  ASA Prophylaxis  Lung CA Screening    Cary Martinez Mai, MD  [facility]  "

## 2021-07-09 NOTE — PROGRESS NOTES
"{2021 PROVIDER CHARTING PREFERENCE:356327}    Subjective     Salvador Crespo is a 50 year old male who presents to clinic today for the following health issues accompanied by his {accompanied to visit:374934}:    Patient also has concerns for lazy eye on the left side.     History of Present Illness       Hypertension: He presents for follow up of hypertension.  He does check blood pressure  regularly outside of the clinic. Outside blood pressures have been over 140/90. He does not follow a low salt diet.     Migraines:   Since the patient's last clinic visit, headaches are: worsened  The patient is getting headaches:  Daily  He is able to do normal daily activities when he has a migraine.  The patient is taking the following rescue/relief medications:  Ibuprofen (Advil, Motrin) and Tylenol   Patient states \"I get some relief\" from the rescue/relief medications.   The patient is taking the following medications to prevent migraines:  No medications to prevent migraines  In the past 4 weeks, the patient has gone to an Urgent Care or Emergency Room 0 times times due to headaches.    He eats 2-3 servings of fruits and vegetables daily.He consumes 4 sweetened beverage(s) daily.He exercises with enough effort to increase his heart rate 10 to 19 minutes per day.  He exercises with enough effort to increase his heart rate 6 days per week.   He is taking medications regularly.       {SUPERLIST (Optional):313723}  {additonal problems for provider to add (Optional):650244}    Review of Systems   {ROS COMP (Optional):956245}      Objective    BP (!) 152/94   Pulse 91   Temp 97.9  F (36.6  C) (Temporal)   Resp 20   Ht 1.854 m (6' 0.99\")   Wt 97.3 kg (214 lb 6.4 oz)   SpO2 97%   BMI 28.29 kg/m     Body mass index is 28.29 kg/m .  Physical Exam   {Exam List (Optional):609169}    {Diagnostic Test Results (Optional):490487}      "

## 2021-07-09 NOTE — PROGRESS NOTES
Prior to immunization administration, verified patients identity using patient s name and date of birth. Please see Immunization Activity for additional information.     Screening Questionnaire for Adult Immunization    Are you sick today?   No   Do you have allergies to medications, food, a vaccine component or latex?   No   Have you ever had a serious reaction after receiving a vaccination?   No   Do you have a long-term health problem with heart, lung, kidney, or metabolic disease (e.g., diabetes), asthma, a blood disorder, no spleen, complement component deficiency, a cochlear implant, or a spinal fluid leak?  Are you on long-term aspirin therapy?   No   Do you have cancer, leukemia, HIV/AIDS, or any other immune system problem?   No   Do you have a parent, brother, or sister with an immune system problem?   No   In the past 3 months, have you taken medications that affect  your immune system, such as prednisone, other steroids, or anticancer drugs; drugs for the treatment of rheumatoid arthritis, Crohn s disease, or psoriasis; or have you had radiation treatments?   No   Have you had a seizure, or a brain or other nervous system problem?   No   During the past year, have you received a transfusion of blood or blood    products, or been given immune (gamma) globulin or antiviral drug?   No   For women: Are you pregnant or is there a chance you could become       pregnant during the next month?   No   Have you received any vaccinations in the past 4 weeks?   No     Immunization questionnaire answers were all negative.        Per orders of Dr. SO, injection of PCV 23 AND TDAP given by Rochelle Hsu CMA. Patient instructed to remain in clinic for 15 minutes afterwards, and to report any adverse reaction to me immediately.       Screening performed by Rochelle Hsu CMA on 7/9/2021 at 2:24 PM.

## 2021-07-09 NOTE — Clinical Note
Please refer him to ophthalmology as soon as possible for pressure pain on the left eye  Please set up for stress echocardiogram (ordered)

## 2021-07-10 PROBLEM — N52.9 ERECTILE DYSFUNCTION, UNSPECIFIED ERECTILE DYSFUNCTION TYPE: Status: ACTIVE | Noted: 2021-07-10

## 2021-07-10 LAB — HCV AB SERPL QL IA: NONREACTIVE

## 2021-07-12 ENCOUNTER — TELEPHONE (OUTPATIENT)
Dept: FAMILY MEDICINE | Facility: CLINIC | Age: 51
End: 2021-07-12

## 2021-07-12 DIAGNOSIS — H57.9 EYE PRESSURE: Primary | ICD-10-CM

## 2021-08-08 ENCOUNTER — NURSE TRIAGE (OUTPATIENT)
Dept: NURSING | Facility: CLINIC | Age: 51
End: 2021-08-08

## 2021-08-08 NOTE — TELEPHONE ENCOUNTER
Pt and his wife are calling.    Appt tomorrow for a stress test. He eats and takes meds in the AM and is wondering if he needs to take his meds and eat before or wait until after.  I advised him to not eat or take anything except water 4 hours before. No caffeine 24 hrs before.  I advised her to contact them when open tomorrow regarding his medications and if he should wait on those.    Reason for Disposition    Question about upcoming scheduled test, no triage required and triager able to answer question    Additional Information    Negative: [1] Caller is not with the adult (patient) AND [2] reporting urgent symptoms    Negative: Lab result questions    Negative: Medication questions    Negative: Caller can't be reached by phone    Negative: Caller has already spoken to PCP or another triager    Negative: RN needs further essential information from caller in order to complete triage    Negative: Requesting regular office appointment    Negative: [1] Caller requesting NON-URGENT health information AND [2] PCP's office is the best resource    Negative: Health Information question, no triage required and triager able to answer question    Negative: General information question, no triage required and triager able to answer question    Protocols used: INFORMATION ONLY CALL - NO TRIAGE-A-    Jayashree Lin, ANIL  [facility] Nurse Advisor  8/8/2021 at 5:46 PM

## 2021-08-11 ENCOUNTER — HOSPITAL ENCOUNTER (OUTPATIENT)
Dept: CARDIOLOGY | Facility: CLINIC | Age: 51
Discharge: HOME | End: 2021-08-11
Attending: FAMILY MEDICINE | Admitting: FAMILY MEDICINE
Payer: MEDICAID

## 2021-08-11 DIAGNOSIS — Z86.73 HISTORY OF TIA (TRANSIENT ISCHEMIC ATTACK) AND STROKE: ICD-10-CM

## 2021-08-11 DIAGNOSIS — I10 HYPERTENSION, UNSPECIFIED TYPE: ICD-10-CM

## 2021-08-11 DIAGNOSIS — R07.9 CHEST PAIN, UNSPECIFIED TYPE: ICD-10-CM

## 2021-08-11 PROCEDURE — 93350 STRESS TTE ONLY: CPT | Mod: 26 | Performed by: INTERNAL MEDICINE

## 2021-08-11 PROCEDURE — 255N000002 HC RX 255 OP 636: Performed by: INTERNAL MEDICINE

## 2021-08-11 PROCEDURE — C8928 TTE W OR W/O FOL W/CON,STRES: HCPCS

## 2021-08-11 PROCEDURE — 93016 CV STRESS TEST SUPVJ ONLY: CPT | Performed by: INTERNAL MEDICINE

## 2021-08-11 PROCEDURE — 93325 DOPPLER ECHO COLOR FLOW MAPG: CPT | Mod: 26 | Performed by: INTERNAL MEDICINE

## 2021-08-11 PROCEDURE — 93321 DOPPLER ECHO F-UP/LMTD STD: CPT | Mod: 26 | Performed by: INTERNAL MEDICINE

## 2021-08-11 PROCEDURE — 93018 CV STRESS TEST I&R ONLY: CPT | Performed by: INTERNAL MEDICINE

## 2021-08-11 PROCEDURE — 93017 CV STRESS TEST TRACING ONLY: CPT

## 2021-08-11 RX ADMIN — HUMAN ALBUMIN MICROSPHERES AND PERFLUTREN 5 ML: 10; .22 INJECTION, SOLUTION INTRAVENOUS at 11:54

## 2021-08-13 ENCOUNTER — TRANSFERRED RECORDS (OUTPATIENT)
Dept: HEALTH INFORMATION MANAGEMENT | Facility: CLINIC | Age: 51
End: 2021-08-13
Payer: MEDICAID

## 2021-08-13 LAB — RETINOPATHY: NORMAL

## 2021-08-13 NOTE — TELEPHONE ENCOUNTER
Patients girlfriend calling states he had to cancel the eye appointment in [place] that the referral was for due to being sick, patient has an appointment today with [facility]. Patient would like a new referral/order for them. Please call to advise

## 2021-09-02 ENCOUNTER — TELEPHONE (OUTPATIENT)
Dept: FAMILY MEDICINE | Facility: CLINIC | Age: 51
End: 2021-09-02

## 2021-09-06 DIAGNOSIS — I10 HYPERTENSION, UNSPECIFIED TYPE: ICD-10-CM

## 2021-09-08 RX ORDER — AMLODIPINE BESYLATE 5 MG/1
TABLET ORAL
Qty: 30 TABLET | Refills: 0 | Status: SHIPPED | OUTPATIENT
Start: 2021-09-08 | End: 2021-10-01

## 2021-09-08 NOTE — TELEPHONE ENCOUNTER
Routing refill request to provider for review/approval because:  Elevated BP on file.     Lindsay Bah RN

## 2021-09-08 NOTE — TELEPHONE ENCOUNTER
1 month supply refilled.  Please stop by to get a blood pressure check and to get lab - BMP (HTN, low potasium)

## 2021-09-11 ENCOUNTER — TELEPHONE (OUTPATIENT)
Dept: FAMILY MEDICINE | Facility: CLINIC | Age: 51
End: 2021-09-11

## 2021-09-11 DIAGNOSIS — H34.9: Primary | ICD-10-CM

## 2021-09-11 DIAGNOSIS — I10 HYPERTENSION, UNSPECIFIED TYPE: ICD-10-CM

## 2021-09-11 NOTE — TELEPHONE ENCOUNTER
Reason for Call:  Other call back    Detailed comments: patient has an appointment on September 22 video with Dr. Schaeffer at [facility].  Medication Atorvastatin out on Monday next week and wondering if can get until this appointment?  Would like to pickup at [facility] in [place].    Also, patient received a referral for an eye appointment.  States that eye provider needs patient to be seen with a Neurologist instead due to possible arteries clogged in eye.  Please contact patient on next steps.  Thank you.    Phone Number Patient can be reached at: Home number on file [phone] (home)    Best Time: any    Can we leave a detailed message on this number? YES    Call taken on 9/11/2021 at 12:07 PM by Danieal Leon

## 2021-09-13 RX ORDER — ATORVASTATIN CALCIUM 20 MG/1
10 TABLET, FILM COATED ORAL DAILY
Qty: 45 TABLET | Refills: 0 | Status: SHIPPED | OUTPATIENT
Start: 2021-09-13 | End: 2021-12-06

## 2021-09-26 ENCOUNTER — HEALTH MAINTENANCE LETTER (OUTPATIENT)
Age: 51
End: 2021-09-26

## 2021-09-28 ENCOUNTER — OFFICE VISIT (OUTPATIENT)
Dept: FAMILY MEDICINE | Facility: CLINIC | Age: 51
End: 2021-09-28
Payer: COMMERCIAL

## 2021-09-28 VITALS
DIASTOLIC BLOOD PRESSURE: 94 MMHG | RESPIRATION RATE: 18 BRPM | SYSTOLIC BLOOD PRESSURE: 132 MMHG | TEMPERATURE: 97.6 F | OXYGEN SATURATION: 96 % | HEIGHT: 73 IN | HEART RATE: 96 BPM | BODY MASS INDEX: 28.1 KG/M2 | WEIGHT: 212 LBS

## 2021-09-28 DIAGNOSIS — F17.200 TOBACCO USE DISORDER: ICD-10-CM

## 2021-09-28 DIAGNOSIS — H57.12 EYE PAIN, LEFT: ICD-10-CM

## 2021-09-28 DIAGNOSIS — Z28.21 COVID-19 VACCINATION REFUSED: ICD-10-CM

## 2021-09-28 DIAGNOSIS — Z28.21 REFUSED INFLUENZA VACCINE: ICD-10-CM

## 2021-09-28 DIAGNOSIS — I10 HYPERTENSION, UNSPECIFIED TYPE: Primary | ICD-10-CM

## 2021-09-28 DIAGNOSIS — R06.81 APNEA: ICD-10-CM

## 2021-09-28 DIAGNOSIS — R68.2 DRY MOUTH: ICD-10-CM

## 2021-09-28 DIAGNOSIS — E11.65 TYPE 2 DIABETES MELLITUS WITH HYPERGLYCEMIA, WITHOUT LONG-TERM CURRENT USE OF INSULIN (H): ICD-10-CM

## 2021-09-28 DIAGNOSIS — Z86.73 HISTORY OF TIA (TRANSIENT ISCHEMIC ATTACK) AND STROKE: ICD-10-CM

## 2021-09-28 LAB
ANION GAP SERPL CALCULATED.3IONS-SCNC: 4 MMOL/L (ref 3–14)
BUN SERPL-MCNC: 20 MG/DL (ref 7–30)
CALCIUM SERPL-MCNC: 8.9 MG/DL (ref 8.5–10.1)
CHLORIDE BLD-SCNC: 103 MMOL/L (ref 94–109)
CO2 SERPL-SCNC: 28 MMOL/L (ref 20–32)
CREAT SERPL-MCNC: 0.83 MG/DL (ref 0.66–1.25)
ERYTHROCYTE [DISTWIDTH] IN BLOOD BY AUTOMATED COUNT: 12.8 % (ref 10–15)
GFR SERPL CREATININE-BSD FRML MDRD: >90 ML/MIN/1.73M2
GLUCOSE BLD-MCNC: 386 MG/DL (ref 70–99)
HBA1C MFR BLD: 9 % (ref 0–5.6)
HCT VFR BLD AUTO: 45.2 % (ref 40–53)
HGB BLD-MCNC: 16.5 G/DL (ref 13.3–17.7)
MCH RBC QN AUTO: 31.9 PG (ref 26.5–33)
MCHC RBC AUTO-ENTMCNC: 36.5 G/DL (ref 31.5–36.5)
MCV RBC AUTO: 87 FL (ref 78–100)
PLATELET # BLD AUTO: 205 10E3/UL (ref 150–450)
POTASSIUM BLD-SCNC: 3.5 MMOL/L (ref 3.4–5.3)
RBC # BLD AUTO: 5.17 10E6/UL (ref 4.4–5.9)
SODIUM SERPL-SCNC: 135 MMOL/L (ref 133–144)
TSH SERPL DL<=0.005 MIU/L-ACNC: 0.74 MU/L (ref 0.4–4)
WBC # BLD AUTO: 7.6 10E3/UL (ref 4–11)

## 2021-09-28 PROCEDURE — 83036 HEMOGLOBIN GLYCOSYLATED A1C: CPT | Performed by: FAMILY MEDICINE

## 2021-09-28 PROCEDURE — 84443 ASSAY THYROID STIM HORMONE: CPT | Performed by: FAMILY MEDICINE

## 2021-09-28 PROCEDURE — 36415 COLL VENOUS BLD VENIPUNCTURE: CPT | Performed by: FAMILY MEDICINE

## 2021-09-28 PROCEDURE — 80048 BASIC METABOLIC PNL TOTAL CA: CPT | Performed by: FAMILY MEDICINE

## 2021-09-28 PROCEDURE — 85027 COMPLETE CBC AUTOMATED: CPT | Performed by: FAMILY MEDICINE

## 2021-09-28 PROCEDURE — 99214 OFFICE O/P EST MOD 30 MIN: CPT | Performed by: FAMILY MEDICINE

## 2021-09-28 ASSESSMENT — PAIN SCALES - GENERAL: PAINLEVEL: NO PAIN (0)

## 2021-09-28 ASSESSMENT — MIFFLIN-ST. JEOR: SCORE: 1870.35

## 2021-09-28 NOTE — Clinical Note
Please let patient know that I sent him for scan to evaluate the blood vessels patency on his neck and his brain.  Please help to set up the CTA for him -ordered    Also encouraged him to increase amlodipine from 5 mg to 7.5 mg which is 1.5 tablet daily.  Return to clinic in about 10 days for blood pressure check.    Would like him to follow-up in the clinic in 3 months for peripheral diabetic follow-up.  Will also get lab done at that time.    thanks

## 2021-09-28 NOTE — PROGRESS NOTES
Assessment & Plan     (I10) Hypertension, unspecified type  (primary encounter diagnosis)  Comment: Blood pressure is minimally elevated today.  Comorbidity include history of stroke and now is diabetes.  He also has smoked tobacco's.  The goal for his blood pressure to be in the 120s/80s.  We will continue with the Hyzaar and metoprolol.  Increase the amlodipine from 5 mg to 7.5 mg daily.  Encouraged to monitor blood pressure closely, call any BP if it persistently above 140/90.   Emphasized on healthy/low salt diet, daily excercise and weight loss.  Avoid high sugar/caffeine intake. Lab as ordered.  Follow up in 10 days for blood pressure check and 6 month for HTN follow up.      (Z86.73) History of TIA (transient ischemic attack) and stroke  Comment: Will continue with the Lipitor and aspirin.  Will need to be on a statin for life.  Will better control his blood pressure as discussed above.  Also will try to better control his new diagnosed diabetic, the goal for hemoglobin A1c is to be less than 7.0.  He was strongly encouraged to stop smoking.  He was seen an ophthalmologist recently for left eye pain and there was some concern of arterial occlusion.  Will sent for CTA to evaluate for patency of the carotic and vertebral artery.      (H57.12) Eye pain, left  Comment: No acute change in his vision.  Encouraged to to work with the ophthalmologist per his recommendation.      (R68.2) Dry mouth  Comment: New onset.  Although he has no history of diabetes, the clinical presentation was suggestive of diabetes.  Labs as ordered which include TSH, BMP, CBC and hemoglobin A1c.  Encouraged to drink a lot of water.    Lab results in the confirm that he is diabetic with a hemoglobin A1c of 9.0.  Please see below for further details.    Plan: TSH with free T4 reflex, Basic metabolic panel         (Ca, Cl, CO2, Creat, Gluc, K, Na, BUN),         Hemoglobin A1c, CBC with platelets            (R06.81) Apnea  Comment: BMI of  27.  Clinical presentation is suggestive of sleep apnea.  Referred him to sleep medicine for further evaluation.     Plan: Sleep ENT Referral      (F17.200) Tobacco use disorder  Comment: He was strongly encouraged to stop smoking.  Comorbidity include high blood pressure, TIA and now diabetes.  He has been smoking for years.  Discussed with him about the long and short term consequences of tobacco smoking.  Discussed with him about different options for smoking cessation aids.  He is not ready to quit smoking at this time.  Encourage to let me know when he is ready to quit.  In a mean time, I encourage him to reduce to amount of cigarrets smoke as much as possible.  All of his questions were answered.      (Z28.21) COVID-19 vaccination refused  Comment: He was strong recommended Covid and flu vaccination but he declined.  Risk and benefit discussed, he is willing to take the risk.      (Z28.21) Refused influenza vaccine  Comment: He was strong recommended Covid and flu vaccination but he declined.  Risk and benefit discussed, he is willing to take the risk.       (E11.65) Type 2 diabetes mellitus with hyperglycemia, without long-term current use of insulin (H)  Comment: Newly diagnosed diabetes with a hemoglobin A1c's of 9.0.  I discussed with him about the nature of the condition over the phone.  Will start him on low-dose Metformin, taper up slowly as tolerated.  Also started on low-dose glipizide.  Referral to diabetic educator for it education.  Encouraged to monitor blood sugar closely.  Emphasized on healthy diet, exercise and weight loss.  Follow-up in 3 months, earlier if any concerns or question.      Return in about 3 months (around 12/28/2021) for dm follow up.    Cary Martinez Mai, MD  [facility] is a 50 year old who presents for the following health issues  accompanied by his spouse:    HPI     Hypertension Follow-up      Do you check your blood pressure  regularly outside of the clinic? Yes     Are you following a low salt diet? No    Are your blood pressures ever more than 140 on the top number (systolic) OR more   than 90 on the bottom number (diastolic), for example 140/90? Yes    Salvador is here today with his wife for several concerns.  First is to follow-up on his high blood pressure for which he takes amlodipine, Hyzaar and metoprolol.  Been taking the medication as prescribed with no side effect.  Not checking his blood pressure at home.  No chest pain or shortness of breath.  No leg swelling, orthopnea or dyspnea.  Not exercising.  Denies of excessive salt/caffeine intake.    Also is known to have TIA multiple times in the past when he was in [place].  Most recent episode was about 6 months ago.  Víctor takitng Lipitor and aspirin as well as controlling his BP.  Been having the right eye pain.  Saw her ophthalmologist recently and there was concern of occlusion in one of the blood vessels. He was referred to another eye specialist by his ophthalmologist.  No acute change in his vision but still has the pain behind the right eye.  Does not recall if he ever had an MRI of his head or neck.  Never seen a neurologist.  No headache or dizziness.  No problem with his speech.  No focal numbness/tingling sensations or pain.  Continues to smoke about half a pack per day, not ready to quit at this time.      Stated in the last couple weeks, hehas been feeling very tired with bad dry mouth.  Been drinking constantly. He believed he had covid infection last month and not sure if it is is related to the Covid infection.  No history of DM.  No change in diet.    Also is concern about sleep apnea.  His wife noted that he has been gasping for air and at time stop breathing while sleeping, especially when he lay on the left side.  Snoring is not too bad. Never been evaluated for sleep apnea before. Been feeling more tire and fatigue as discussed above.  No significant weight  "change.  No other concern.    Review of Systems   Constitutional, HEENT, cardiovascular, pulmonary, gi and gu systems are negative, except as otherwise noted.      Objective    BP (!) 132/94   Pulse 96   Temp 97.6  F (36.4  C) (Temporal)   Resp 18   Ht 1.854 m (6' 0.99\")   Wt 96.2 kg (212 lb)   SpO2 96%   BMI 27.98 kg/m    Body mass index is 27.98 kg/m .  Physical Exam   GENERAL: healthy, alert and no distress.  Speaking in full sentences and speech was easily comprehended  EYES: Eyes grossly normal to inspection, PERRL and conjunctivae and sclerae normal.  No nystagmus.  HENT: ear canals and TM's normal, nose and mouth without ulcers or lesions.  Nares are non-congested. Oropharynx is pink and moist.  No tonsillar hypertrophy, exudate or erythema.  No tender with palpation to the sinuses.   NECK: Supple, no lymphadenopathy or thyromegaly.  No carotid bruits.  RESP: lungs clear to auscultation - no rales, rhonchi or wheezes, good respiratory effort throughout.  CV: regular rate and rhythm, normal S1 S2, no S3 or S4, no murmur, click or rub, no peripheral edema.  ABDOMEN: soft, nontender, distended, no palpable masses organomegaly with normal bowel sound.  MS: no gross musculoskeletal defects noted, no edema.  SKIN: no suspicious lesions or rashes  NEURO: Normal strength and tone, mentation intact and speech normal.  Cranial nerves II through XII are intact.  DTR +2 throughout.  PSYCH: mentation appears normal, affect normal/bright    Results for orders placed or performed in visit on 09/28/21   TSH with free T4 reflex     Status: Normal   Result Value Ref Range    TSH 0.74 0.40 - 4.00 mU/L   Basic metabolic panel  (Ca, Cl, CO2, Creat, Gluc, K, Na, BUN)     Status: Abnormal   Result Value Ref Range    Sodium 135 133 - 144 mmol/L    Potassium 3.5 3.4 - 5.3 mmol/L    Chloride 103 94 - 109 mmol/L    Carbon Dioxide (CO2) 28 20 - 32 mmol/L    Anion Gap 4 3 - 14 mmol/L    Urea Nitrogen 20 7 - 30 mg/dL    Creatinine " 0.83 0.66 - 1.25 mg/dL    Calcium 8.9 8.5 - 10.1 mg/dL    Glucose 386 (H) 70 - 99 mg/dL    GFR Estimate >90 >60 mL/min/1.73m2   Hemoglobin A1c     Status: Abnormal   Result Value Ref Range    Hemoglobin A1C 9.0 (H) 0.0 - 5.6 %   CBC with platelets     Status: Normal   Result Value Ref Range    WBC Count 7.6 4.0 - 11.0 10e3/uL    RBC Count 5.17 4.40 - 5.90 10e6/uL    Hemoglobin 16.5 13.3 - 17.7 g/dL    Hematocrit 45.2 40.0 - 53.0 %    MCV 87 78 - 100 fL    MCH 31.9 26.5 - 33.0 pg    MCHC 36.5 31.5 - 36.5 g/dL    RDW 12.8 10.0 - 15.0 %    Platelet Count 205 150 - 450 10e3/uL

## 2021-09-29 ENCOUNTER — TELEPHONE (OUTPATIENT)
Dept: FAMILY MEDICINE | Facility: CLINIC | Age: 51
End: 2021-09-29

## 2021-09-29 NOTE — TELEPHONE ENCOUNTER
Spoke to patient as there is no consent to communicate on file. Patient and his girlfriend have been on [employer] looking up his test results and are very concerned.   Patient was originally scheduled for 10/08/21 and was rescheduled to 10/01 as that is this providers next day in clinic.   Camille Pulliam CMA on 9/29/2021 at 4:35 PM

## 2021-09-29 NOTE — TELEPHONE ENCOUNTER
----- Message from Cary Martinez Mai, MD sent at 9/28/2021  9:45 PM CDT -----  Please help to set up a follow up apt this week virtually for lab results and treatment.  lulu

## 2021-09-29 NOTE — TELEPHONE ENCOUNTER
Pt's girlfriend would like a call back to discuss lab results with the doctor.     Please call Alee and patient at [phone]. To discuss labs.    Martínez Christiansen RN on 9/29/2021 at 3:50 PM

## 2021-10-01 ENCOUNTER — VIRTUAL VISIT (OUTPATIENT)
Dept: FAMILY MEDICINE | Facility: CLINIC | Age: 51
End: 2021-10-01
Payer: COMMERCIAL

## 2021-10-01 DIAGNOSIS — I10 HYPERTENSION, UNSPECIFIED TYPE: ICD-10-CM

## 2021-10-01 DIAGNOSIS — Z86.73 HISTORY OF TIA (TRANSIENT ISCHEMIC ATTACK) AND STROKE: ICD-10-CM

## 2021-10-01 DIAGNOSIS — N52.9 ERECTILE DYSFUNCTION, UNSPECIFIED ERECTILE DYSFUNCTION TYPE: ICD-10-CM

## 2021-10-01 DIAGNOSIS — E11.65 TYPE 2 DIABETES MELLITUS WITH HYPERGLYCEMIA, WITHOUT LONG-TERM CURRENT USE OF INSULIN (H): Primary | ICD-10-CM

## 2021-10-01 PROCEDURE — 99214 OFFICE O/P EST MOD 30 MIN: CPT | Mod: 95 | Performed by: FAMILY MEDICINE

## 2021-10-01 RX ORDER — LANCETS
EACH MISCELLANEOUS
Qty: 100 EACH | Refills: 3 | Status: SHIPPED | OUTPATIENT
Start: 2021-10-01

## 2021-10-01 RX ORDER — METOPROLOL SUCCINATE 25 MG/1
25 TABLET, EXTENDED RELEASE ORAL DAILY
Qty: 90 TABLET | Refills: 1 | Status: SHIPPED | OUTPATIENT
Start: 2021-10-01 | End: 2022-05-19

## 2021-10-01 RX ORDER — GLIPIZIDE 5 MG/1
5 TABLET, FILM COATED, EXTENDED RELEASE ORAL DAILY
Qty: 30 TABLET | Refills: 1 | Status: SHIPPED | OUTPATIENT
Start: 2021-10-01 | End: 2022-01-25

## 2021-10-01 RX ORDER — SILDENAFIL 100 MG/1
100 TABLET, FILM COATED ORAL DAILY PRN
Qty: 30 TABLET | Refills: 1 | Status: SHIPPED | OUTPATIENT
Start: 2021-10-01 | End: 2022-10-05

## 2021-10-01 RX ORDER — AMLODIPINE BESYLATE 5 MG/1
5 TABLET ORAL DAILY
Qty: 90 TABLET | Refills: 1 | Status: SHIPPED | OUTPATIENT
Start: 2021-10-01 | End: 2021-10-06

## 2021-10-01 RX ORDER — GLUCOSAMINE HCL/CHONDROITIN SU 500-400 MG
CAPSULE ORAL
Qty: 100 EACH | Refills: 3 | Status: SHIPPED | OUTPATIENT
Start: 2021-10-01

## 2021-10-01 NOTE — PROGRESS NOTES
Salvador is a 50 year old who is being evaluated via a billable telephone visit.      What phone number would you like to be contacted at? [phone]  How would you like to obtain your AVS? Peggyhart    Assessment & Plan       ICD-10-CM    1. Type 2 diabetes mellitus with hyperglycemia, without long-term current use of insulin (H)  E11.65 metFORMIN (GLUCOPHAGE) 500 MG tablet     glipiZIDE (GLUCOTROL XL) 5 MG 24 hr tablet     AMB Adult Diabetes Educator Referral   2. Hypertension, unspecified type  I10 amLODIPine (NORVASC) 5 MG tablet     metoprolol succinate ER (TOPROL-XL) 25 MG 24 hr tablet   3. Erectile dysfunction, unspecified erectile dysfunction type  N52.9 sildenafil (VIAGRA) 100 MG tablet   4. History of TIA (transient ischemic attack) and stroke  Z86.73         New diagnose with Hgb A1c of 9.0 couple days ago.  Been having the dry mouth and generally not feeling well.  Known to have high blood pressure and history of TIA.  Smoking 1/2 pack a day.  Never been on medication for diabetes.  Discussed with patient about the nature of the condition and potential complication associated with uncontrolled diabetes.  He was educated about the goal for his fasting blood sugar and HgB A1C.  Encourage him to start monitoring blood sugar 1-2 times a day.  Started him on Metformin 500 mg bid, and will increase its dose as tolerated.  Also start on glipizide XL 5 mg daily.  Side effect discussed and no contraindication identified.  Emphasized on healthy diet, daily exercise and weight loss.  Continue with Hyzaar, Lipitor and aspirin.  Will refer him to diabetic educator and ophthalmology for retinal exam.  F/U in 3 months, earlier as needed.      Will continue with metoprolol, Hyzaar, amlodipine for high blood pressure and Lipitor for TIA.  Will continue with the aspirin as well.  He was strongly encouraged to stop smoking.    27 minutes spent on the date of the encounter doing chart review, patient visit and documentation        Return in about 3 months (around 1/1/2022) for dm follow up.    Cary Martinez Mai, MD  [facility]    Olena Franco is a 50 year old who presents for the following health issues     HPI     Go over lab results     Savlador was seen today for lab result follow-up.  He was seen last week for multiple concerns, including dry mouth.  Labs ordered which showed that he is diabetic.  This is a new diagnosis.  He is being treated for high blood pressure and history of multiple TIA.  Been taking the medications as prescribed.  He is on Hyzaar, metoprolol and amlodipine for high blood pressure and Lipitor for high cholesterol.  He also takes 325 mg aspirin daily.  Currently smoke 1/2 pack/day.  No drugs or alcohol.    He also request to increase the Viagra dose.  Currently take 80 mg with minimal effect.  He is not taking nitro or the prostate medication.    Review of Systems   Constitutional, HEENT, cardiovascular, pulmonary, gi and gu systems are negative, except as otherwise noted.      Objective           Vitals:  No vitals were obtained today due to virtual visit.    Physical Exam   healthy, alert and no distress  PSYCH: Alert and oriented times 3; coherent speech, normal   rate and volume, able to articulate logical thoughts, able   to abstract reason, no tangential thoughts, no hallucinations   or delusions  His affect is normal  RESP: No cough, no audible wheezing, able to talk in full sentences  Remainder of exam unable to be completed due to telephone visits    No results found for any visits on 10/01/21.     Labs on 9/28/21: CBC and THS level were normal.  BMP was normal except of the blood sugar 386.  Hemoglobin A1c was 9.0.            Phone call duration: 13 minutes

## 2021-10-05 ENCOUNTER — TELEPHONE (OUTPATIENT)
Dept: FAMILY MEDICINE | Facility: CLINIC | Age: 51
End: 2021-10-05

## 2021-10-05 DIAGNOSIS — F17.200 TOBACCO USE DISORDER: Primary | ICD-10-CM

## 2021-10-05 NOTE — TELEPHONE ENCOUNTER
Pt girlfriend called in on his behalf. No C2C but I was able to gather her concerns.     Pt hasn't heard what type of scan is needed for his eye from Dr. Schaeffer, would like follow up with this as it's painful.     She also referred to pt's COPD and that he is having an exacerbation of heavier more labored breathing, she also states he is sleeping a lot. She is very concerned but pt refused to go to ED.     Sending to triage to reach out to pt and also Dr. Schaeffer regarding the orders question

## 2021-10-05 NOTE — TELEPHONE ENCOUNTER
Talked to patient over the phone, stated that he is feeling fine.  Overall about the same - feels like not able to take a deep breath.  No chest pain, shortness of breath or wheezing.  No orthopnea or dyspnea.  Been using the rescue inhaler about 3 times a day for a while which has helped.  Continues to smoke but is cutting down.  He does not need to feel due to the need to be seen today.  He has no concern.  Wll have him continue with the current medication.  Hold off on the glipizide for 10 days then restarted.  Continue with the Metformin.  Also start him on the Spiriva inhaler.  Cary Schaeffer MD.

## 2021-10-07 ENCOUNTER — ALLIED HEALTH/NURSE VISIT (OUTPATIENT)
Dept: EDUCATION SERVICES | Facility: CLINIC | Age: 51
End: 2021-10-07
Attending: FAMILY MEDICINE
Payer: COMMERCIAL

## 2021-10-07 ENCOUNTER — TELEPHONE (OUTPATIENT)
Dept: FAMILY MEDICINE | Facility: CLINIC | Age: 51
End: 2021-10-07

## 2021-10-07 DIAGNOSIS — E11.65 TYPE 2 DIABETES MELLITUS WITH HYPERGLYCEMIA, WITHOUT LONG-TERM CURRENT USE OF INSULIN (H): ICD-10-CM

## 2021-10-07 PROCEDURE — G0108 DIAB MANAGE TRN  PER INDIV: HCPCS

## 2021-10-07 NOTE — PROGRESS NOTES
Before diabetes and COVID was having more sweets and soda, 6-10 cans/day. Now 2 cans/day.   Dinners : meat,     3 mini strokes over the years.   Has not started glipizide or metformin.         Diabetes Self-Management Education & Support    Presents for: Initial Assessment for new diagnosis    Type of Visit: In Person    How would patient like to obtain AVS? MyChart    ASSESSMENT:    Patient is confused about how blood sugars went from being okay to so high. Seems to have gone out of control after having COVID. He has not started metformin yet. Pharmacy needed clarification on dosing. Set him up with meter today. He has cut out a lot of sugar so is doing much better in that area. BG today is 415 at 2.5 hours after breakfast of 2 eggs.     Patient's most recent   Lab Results   Component Value Date    A1C 9.0 09/28/2021    is not meeting goal of <7.0    Diabetes knowledge and skills assessment:   Patient is knowledgeable in diabetes management concepts related to: Healthy Eating    Continue education with the following diabetes management concepts: Healthy Eating, Being Active, Monitoring, Taking Medication, Problem Solving, Reducing Risks and Healthy Coping    Based on learning assessment above, most appropriate setting for further diabetes education would be: Individual setting.    INTERVENTIONS:    Education provided today on:  AADE Self-Care Behaviors:  Healthy Eating: portion control and reducing added sugars, what to drink  Monitoring: purpose, proper technique, log and interpret results, individual blood glucose targets and frequency of monitoring  Taking Medication: action of prescribed medication, side effects of prescribed medications and when to take medications  Reducing Risks: A1C - goals, relating to blood glucose levels, how often to check  Healthy Coping: recognize feelings about diagnosis and benefits of making appropriate lifestyle changes    Opportunities for ongoing education and support in  "diabetes-self management were discussed. Pt verbalized understanding of concepts discussed and recommendations provided today.       Education Materials Provided:  [facility] Understanding Diabetes Booklet and Accu-Chek Guide Me meter kit          PLAN    See instructions  Topics to cover at upcoming visits: Healthy Eating, Taking Medication, Reducing Risks and review numbers  Follow-up: 11/5    See Goals Section for co-developed, patient-stated behavior change goals.  AVS provided to patient today.          SUBJECTIVE / OBJECTIVE:  Presents for: Initial Assessment for new diagnosis  Accompanied by: Self  Diabetes education in the past 24mo: No  Focus of Visit: Monitoring, Healthy Eating, Taking Medication  Diabetes type: Type 2  Date of diagnosis: September 2021  Cultural Influences/Ethnic Background:  Not  or       Diabetes Symptoms & Complications:  Fatigue: Yes  Neuropathy: No  Polydipsia: Yes  Polyphagia: Sometimes  Polyuria: Yes  Visual change: Sometimes  Weight trend: Decreasing  Complications assessed today?: No    Patient Problem List and Family Medical History reviewed for relevant medical history, current medical status, and diabetes risk factors.    Vitals:  There were no vitals taken for this visit.  Estimated body mass index is 27.98 kg/m  as calculated from the following:    Height as of 9/28/21: 1.854 m (6' 0.99\").    Weight as of 9/28/21: 96.2 kg (212 lb).   Last 3 BP:   BP Readings from Last 3 Encounters:   09/28/21 (!) 132/94   07/09/21 (!) 152/94   06/29/21 132/74       History   Smoking Status     Current Every Day Smoker     Packs/day: 0.50     Types: Cigarettes   Smokeless Tobacco     Never Used       Labs:  Lab Results   Component Value Date    A1C 9.0 09/28/2021     Lab Results   Component Value Date     09/28/2021     07/09/2021     Lab Results   Component Value Date    LDL 81 07/09/2021     HDL Cholesterol   Date Value Ref Range Status   07/09/2021 35 (L) " >39 mg/dL Final   ]  GFR Estimate   Date Value Ref Range Status   09/28/2021 >90 >60 mL/min/1.73m2 Final     Comment:     As of July 11, 2021, eGFR is calculated by the CKD-EPI creatinine equation, without race adjustment. eGFR can be influenced by muscle mass, exercise, and diet. The reported eGFR is an estimation only and is only applicable if the renal function is stable.   07/09/2021 >90 >60 mL/min/[1.73_m2] Final     Comment:     Non  GFR Calc  Starting 12/18/2018, serum creatinine based estimated GFR (eGFR) will be   calculated using the Chronic Kidney Disease Epidemiology Collaboration   (CKD-EPI) equation.       GFR Estimate If Black   Date Value Ref Range Status   07/09/2021 >90 >60 mL/min/[1.73_m2] Final     Comment:      GFR Calc  Starting 12/18/2018, serum creatinine based estimated GFR (eGFR) will be   calculated using the Chronic Kidney Disease Epidemiology Collaboration   (CKD-EPI) equation.       Lab Results   Component Value Date    CR 0.83 09/28/2021    CR 0.95 07/09/2021     No results found for: MICROALBUMIN    Healthy Eating:  Healthy Eating Assessed Today: Yes  Meal planning/habits: Avoiding sweets  Meals include: Breakfast, Dinner, Afternoon Snack  Breakfast: has to eat breakfast for BP med. has 3 eggs with parmesan cheese. no toast or antonio/sausage, or breakfast bowl. quit coffee  Lunch: string cheese or beef jerky.  processed meat makes stomach sick.  Dinner: steak, chicken with vegetables steamed frozen mixed vegetables  Snacks: beef jerky. likes to make Rice Krispies.  Other: was drinking juice to quit soda.  drinking more water now.  Beverages: Water, Soda    Being Active:  Being Active Assessed Today: No    Monitoring:  Monitoring Assessed Today: Yes  Times checking blood sugar at home (number): Never        Taking Medications:  Diabetes Medication(s)     Biguanides       metFORMIN (GLUCOPHAGE) 500 MG tablet    Take 1 tablet bid with meals for 2 weeks then  increase to 2 tablets twice daily     Patient not taking: Reported on 10/7/2021    Sulfonylureas       glipiZIDE (GLUCOTROL XL) 5 MG 24 hr tablet    Take 1 tablet (5 mg) by mouth daily     Patient not taking: Reported on 10/7/2021          Taking Medication Assessed Today: Yes  Current Treatments: Oral Medication (taken by mouth)    Problem Solving:  Problem Solving Assessed Today: No      Reducing Risks:  Reducing Risks Assessed Today: Yes  CAD Risks: Diabetes Mellitus, Dyslipidemia, Male sex, Hypertension, Family history, Tobacco exposure    Healthy Coping:  Healthy Coping Assessed Today: Yes  Emotional response to diabetes: Ready to learn, Acceptance  Informal Support system:: Family  Stage of change: ACTION (Actively working towards change)  Patient Activation Measure Survey Score:  No flowsheet data found.          Layne Branch RDN, JUVENTINO, NOA    Time Spent: 90 minutes  Encounter Type: Individual        Any diabetes medication dose changes were made via the Certified Diabetes Care & Education Protocol in collaboration with the patient's primary care provider. A copy of this encounter was shared with the provider.

## 2021-10-07 NOTE — PATIENT INSTRUCTIONS
Diabetes Plan:     Start metformin 1 daily with supper for first week.   2nd week: 1 tab with breakfast and supper.   3rd week: 1 tab with breakfast and 2 with supper.   4th week: 2 tabs with breakfast and supper.     Check blood sugar before breakfast and then one other time of day maybe starting with 2 hours after supper.   Goal fasting is eventually is . The after meal goal would be <180.     Let us know if blood sugars are not trending down. [employer] message or call [phone] for diabetes ed dept.

## 2021-10-07 NOTE — LETTER
10/7/2021         RE: Salvador Crespo  [address] [facility] [address]  [place] [address]        Dear Colleague,    Thank you for referring your patient, Salvador Crespo, to the [facility] [place]. Please see a copy of my visit note below.    Before diabetes and COVID was having more sweets and soda, 6-10 cans/day. Now 2 cans/day.   Dinners : meat,     3 mini strokes over the years.   Has not started glipizide or metformin.         Diabetes Self-Management Education & Support    Presents for: Initial Assessment for new diagnosis    Type of Visit: In Person    How would patient like to obtain AVS? MyChart    ASSESSMENT:    Patient is confused about how blood sugars went from being okay to so high. Seems to have gone out of control after having COVID. He has not started metformin yet. Pharmacy needed clarification on dosing. Set him up with meter today. He has cut out a lot of sugar so is doing much better in that area. BG today is 415 at 2.5 hours after breakfast of 2 eggs.     Patient's most recent   Lab Results   Component Value Date    A1C 9.0 09/28/2021    is not meeting goal of <7.0    Diabetes knowledge and skills assessment:   Patient is knowledgeable in diabetes management concepts related to: Healthy Eating    Continue education with the following diabetes management concepts: Healthy Eating, Being Active, Monitoring, Taking Medication, Problem Solving, Reducing Risks and Healthy Coping    Based on learning assessment above, most appropriate setting for further diabetes education would be: Individual setting.    INTERVENTIONS:    Education provided today on:  AADE Self-Care Behaviors:  Healthy Eating: portion control and reducing added sugars, what to drink  Monitoring: purpose, proper technique, log and interpret results, individual blood glucose targets and frequency of monitoring  Taking Medication: action of prescribed medication, side effects of prescribed medications and when to take  "medications  Reducing Risks: A1C - goals, relating to blood glucose levels, how often to check  Healthy Coping: recognize feelings about diagnosis and benefits of making appropriate lifestyle changes    Opportunities for ongoing education and support in diabetes-self management were discussed. Pt verbalized understanding of concepts discussed and recommendations provided today.       Education Materials Provided:   [employer] Understanding Diabetes Booklet and Accu-Chek Guide Me meter kit          PLAN    See instructions  Topics to cover at upcoming visits: Healthy Eating, Taking Medication, Reducing Risks and review numbers  Follow-up: 11/5    See Goals Section for co-developed, patient-stated behavior change goals.  AVS provided to patient today.          SUBJECTIVE / OBJECTIVE:  Presents for: Initial Assessment for new diagnosis  Accompanied by: Self  Diabetes education in the past 24mo: No  Focus of Visit: Monitoring, Healthy Eating, Taking Medication  Diabetes type: Type 2  Date of diagnosis: September 2021  Cultural Influences/Ethnic Background:  Not  or       Diabetes Symptoms & Complications:  Fatigue: Yes  Neuropathy: No  Polydipsia: Yes  Polyphagia: Sometimes  Polyuria: Yes  Visual change: Sometimes  Weight trend: Decreasing  Complications assessed today?: No    Patient Problem List and Family Medical History reviewed for relevant medical history, current medical status, and diabetes risk factors.    Vitals:  There were no vitals taken for this visit.  Estimated body mass index is 27.98 kg/m  as calculated from the following:    Height as of 9/28/21: 1.854 m (6' 0.99\").    Weight as of 9/28/21: 96.2 kg (212 lb).   Last 3 BP:   BP Readings from Last 3 Encounters:   09/28/21 (!) 132/94   07/09/21 (!) 152/94   06/29/21 132/74       History   Smoking Status     Current Every Day Smoker     Packs/day: 0.50     Types: Cigarettes   Smokeless Tobacco     Never Used       Labs:  Lab Results "   Component Value Date    A1C 9.0 09/28/2021     Lab Results   Component Value Date     09/28/2021     07/09/2021     Lab Results   Component Value Date    LDL 81 07/09/2021     HDL Cholesterol   Date Value Ref Range Status   07/09/2021 35 (L) >39 mg/dL Final   ]  GFR Estimate   Date Value Ref Range Status   09/28/2021 >90 >60 mL/min/1.73m2 Final     Comment:     As of July 11, 2021, eGFR is calculated by the CKD-EPI creatinine equation, without race adjustment. eGFR can be influenced by muscle mass, exercise, and diet. The reported eGFR is an estimation only and is only applicable if the renal function is stable.   07/09/2021 >90 >60 mL/min/[1.73_m2] Final     Comment:     Non  GFR Calc  Starting 12/18/2018, serum creatinine based estimated GFR (eGFR) will be   calculated using the Chronic Kidney Disease Epidemiology Collaboration   (CKD-EPI) equation.       GFR Estimate If Black   Date Value Ref Range Status   07/09/2021 >90 >60 mL/min/[1.73_m2] Final     Comment:      GFR Calc  Starting 12/18/2018, serum creatinine based estimated GFR (eGFR) will be   calculated using the Chronic Kidney Disease Epidemiology Collaboration   (CKD-EPI) equation.       Lab Results   Component Value Date    CR 0.83 09/28/2021    CR 0.95 07/09/2021     No results found for: MICROALBUMIN    Healthy Eating:  Healthy Eating Assessed Today: Yes  Meal planning/habits: Avoiding sweets  Meals include: Breakfast, Dinner, Afternoon Snack  Breakfast: has to eat breakfast for BP med. has 3 eggs with parmesan cheese. no toast or antonio/sausage, or breakfast bowl. quit coffee  Lunch: string cheese or beef jerky.  processed meat makes stomach sick.  Dinner: steak, chicken with vegetables steamed frozen mixed vegetables  Snacks: beef jerky. likes to make Rice Krispies.  Other: was drinking juice to quit soda.  drinking more water now.  Beverages: Water, Soda    Being Active:  Being Active Assessed Today:  No    Monitoring:  Monitoring Assessed Today: Yes  Times checking blood sugar at home (number): Never        Taking Medications:  Diabetes Medication(s)     Biguanides       metFORMIN (GLUCOPHAGE) 500 MG tablet    Take 1 tablet bid with meals for 2 weeks then increase to 2 tablets twice daily     Patient not taking: Reported on 10/7/2021    Sulfonylureas       glipiZIDE (GLUCOTROL XL) 5 MG 24 hr tablet    Take 1 tablet (5 mg) by mouth daily     Patient not taking: Reported on 10/7/2021          Taking Medication Assessed Today: Yes  Current Treatments: Oral Medication (taken by mouth)    Problem Solving:  Problem Solving Assessed Today: No      Reducing Risks:  Reducing Risks Assessed Today: Yes  CAD Risks: Diabetes Mellitus, Dyslipidemia, Male sex, Hypertension, Family history, Tobacco exposure    Healthy Coping:  Healthy Coping Assessed Today: Yes  Emotional response to diabetes: Ready to learn, Acceptance  Informal Support system:: Family  Stage of change: ACTION (Actively working towards change)  Patient Activation Measure Survey Score:  No flowsheet data found.          Layne Branch RDN, JUVENTINO, CDCES    Time Spent: 90 minutes  Encounter Type: Individual        Any diabetes medication dose changes were made via the Certified Diabetes Care & Education Protocol in collaboration with the patient's primary care provider. A copy of this encounter was shared with the provider.

## 2021-10-07 NOTE — TELEPHONE ENCOUNTER
Please clarify the Metformin rx,  Should it read take 1 tablet twice daily for two weeks and increase to 2 tablets twice daily  As of now the rx reads take 1 tablet twice daily for two weeks then increase to 2 tablets daily so that is not an increase or change.     Please call and clarify or send new script please.

## 2021-10-12 ENCOUNTER — HOSPITAL ENCOUNTER (OUTPATIENT)
Dept: CT IMAGING | Facility: CLINIC | Age: 51
Discharge: HOME | End: 2021-10-12
Attending: FAMILY MEDICINE | Admitting: FAMILY MEDICINE
Payer: COMMERCIAL

## 2021-10-12 DIAGNOSIS — Z86.73 HISTORY OF TIA (TRANSIENT ISCHEMIC ATTACK) AND STROKE: ICD-10-CM

## 2021-10-12 DIAGNOSIS — H57.12 EYE PAIN, LEFT: ICD-10-CM

## 2021-10-12 PROCEDURE — 250N000011 HC RX IP 250 OP 636: Performed by: FAMILY MEDICINE

## 2021-10-12 PROCEDURE — 250N000009 HC RX 250: Performed by: FAMILY MEDICINE

## 2021-10-12 PROCEDURE — 70496 CT ANGIOGRAPHY HEAD: CPT

## 2021-10-12 RX ORDER — IOPAMIDOL 755 MG/ML
500 INJECTION, SOLUTION INTRAVASCULAR ONCE
Status: COMPLETED | OUTPATIENT
Start: 2021-10-12 | End: 2021-10-12

## 2021-10-12 RX ADMIN — SODIUM CHLORIDE 99 ML: 9 INJECTION, SOLUTION INTRAVENOUS at 13:14

## 2021-10-12 RX ADMIN — IOPAMIDOL 70 ML: 755 INJECTION, SOLUTION INTRAVENOUS at 13:14

## 2021-10-13 DIAGNOSIS — R93.89 ABNORMAL COMPUTED TOMOGRAPHY ANGIOGRAPHY (CTA) OF NECK: ICD-10-CM

## 2021-10-13 DIAGNOSIS — Z86.73 HISTORY OF TIA (TRANSIENT ISCHEMIC ATTACK) AND STROKE: Primary | ICD-10-CM

## 2021-10-20 ENCOUNTER — HOSPITAL ENCOUNTER (OUTPATIENT)
Dept: MRI IMAGING | Facility: CLINIC | Age: 51
End: 2021-10-20
Attending: FAMILY MEDICINE
Payer: COMMERCIAL

## 2021-10-20 DIAGNOSIS — Z86.73 HISTORY OF TIA (TRANSIENT ISCHEMIC ATTACK) AND STROKE: ICD-10-CM

## 2021-10-20 DIAGNOSIS — R93.89 ABNORMAL COMPUTED TOMOGRAPHY ANGIOGRAPHY (CTA) OF NECK: ICD-10-CM

## 2021-10-20 PROCEDURE — A9585 GADOBUTROL INJECTION: HCPCS | Performed by: FAMILY MEDICINE

## 2021-10-20 PROCEDURE — 255N000002 HC RX 255 OP 636: Performed by: FAMILY MEDICINE

## 2021-10-20 PROCEDURE — 70553 MRI BRAIN STEM W/O & W/DYE: CPT

## 2021-10-20 PROCEDURE — 70549 MR ANGIOGRAPH NECK W/O&W/DYE: CPT

## 2021-10-20 PROCEDURE — 250N000009 HC RX 250: Performed by: FAMILY MEDICINE

## 2021-10-20 RX ORDER — GADOBUTROL 604.72 MG/ML
10 INJECTION INTRAVENOUS ONCE
Status: COMPLETED | OUTPATIENT
Start: 2021-10-20 | End: 2021-10-20

## 2021-10-20 RX ADMIN — SODIUM CHLORIDE 30 ML: 9 INJECTION, SOLUTION INTRAVENOUS at 13:37

## 2021-10-20 RX ADMIN — GADOBUTROL 10 ML: 604.72 INJECTION INTRAVENOUS at 13:36

## 2021-11-01 ENCOUNTER — TRANSFERRED RECORDS (OUTPATIENT)
Dept: MULTI SPECIALTY CLINIC | Facility: CLINIC | Age: 51
End: 2021-11-01
Payer: COMMERCIAL

## 2021-11-05 ENCOUNTER — TELEPHONE (OUTPATIENT)
Dept: EDUCATION SERVICES | Facility: OTHER | Age: 51
End: 2021-11-05

## 2021-11-05 ENCOUNTER — VIRTUAL VISIT (OUTPATIENT)
Dept: EDUCATION SERVICES | Facility: OTHER | Age: 51
End: 2021-11-05
Payer: COMMERCIAL

## 2021-11-05 DIAGNOSIS — E11.65 TYPE 2 DIABETES MELLITUS WITH HYPERGLYCEMIA, WITHOUT LONG-TERM CURRENT USE OF INSULIN (H): Primary | ICD-10-CM

## 2021-11-05 PROCEDURE — G0108 DIAB MANAGE TRN  PER INDIV: HCPCS | Performed by: DIETITIAN, REGISTERED

## 2021-11-05 NOTE — LETTER
"    11/5/2021         RE: Salvador Crespo  [address] [facility] [address]  [place] [address]        Dear Colleague,    Thank you for referring your patient, Salvador Crespo, to the [facility]. Please see a copy of my visit note below.    Diabetes Self-Management Education & Support    Presents for: Follow-up    SUBJECTIVE/OBJECTIVE:  Presents for: Follow-up  Accompanied by: Self  Diabetes education in the past 24mo: Yes  Focus of Visit: Monitoring, Healthy Eating, Taking Medication, Diabetes Pathophysiology  Diabetes type: Type 2  Date of diagnosis: September 2021  Disease course: Improving  How confident are you filling out medical forms by yourself:: Not Assessed  Difficulty affording diabetes medication?: No  Difficulty affording diabetes testing supplies?: No  Other concerns:: None  Cultural Influences/Ethnic Background:  Not  or     Diabetes Symptoms & Complications:  Fatigue: Yes  Neuropathy: No  Polydipsia: No (Dry mouth - but says on lots of new meds recently)  Polyphagia: No  Polyuria: No  Visual change: Yes (Thinks may be related to stroke)  Symptom course: Improving  Weight trend: Stable  Complications assessed today?: No    Patient Problem List and Family Medical History reviewed for relevant medical history, current medical status, and diabetes risk factors.    Vitals:  There were no vitals taken for this visit.  Estimated body mass index is 27.98 kg/m  as calculated from the following:    Height as of 9/28/21: 1.854 m (6' 0.99\").    Weight as of 9/28/21: 96.2 kg (212 lb).   Last 3 BP:   BP Readings from Last 3 Encounters:   09/28/21 (!) 132/94   07/09/21 (!) 152/94   06/29/21 132/74       History   Smoking Status     Current Every Day Smoker     Packs/day: 0.50     Types: Cigarettes   Smokeless Tobacco     Never Used       Labs:  Lab Results   Component Value Date    A1C 9.0 09/28/2021     Lab Results   Component Value Date     09/28/2021     07/09/2021     Lab " Results   Component Value Date    LDL 81 07/09/2021     HDL Cholesterol   Date Value Ref Range Status   07/09/2021 35 (L) >39 mg/dL Final   ]  GFR Estimate   Date Value Ref Range Status   09/28/2021 >90 >60 mL/min/1.73m2 Final     Comment:     As of July 11, 2021, eGFR is calculated by the CKD-EPI creatinine equation, without race adjustment. eGFR can be influenced by muscle mass, exercise, and diet. The reported eGFR is an estimation only and is only applicable if the renal function is stable.   07/09/2021 >90 >60 mL/min/[1.73_m2] Final     Comment:     Non  GFR Calc  Starting 12/18/2018, serum creatinine based estimated GFR (eGFR) will be   calculated using the Chronic Kidney Disease Epidemiology Collaboration   (CKD-EPI) equation.       GFR Estimate If Black   Date Value Ref Range Status   07/09/2021 >90 >60 mL/min/[1.73_m2] Final     Comment:      GFR Calc  Starting 12/18/2018, serum creatinine based estimated GFR (eGFR) will be   calculated using the Chronic Kidney Disease Epidemiology Collaboration   (CKD-EPI) equation.       Lab Results   Component Value Date    CR 0.83 09/28/2021    CR 0.95 07/09/2021     No results found for: MICROALBUMIN    Healthy Eating:  Healthy Eating Assessed Today: No  Meal planning/habits: Avoiding sweets  Meals include: Breakfast, Dinner, Afternoon Snack  Breakfast: has to eat breakfast for BP med. has 3 eggs with parmesan cheese. no toast or antonio/sausage, or breakfast bowl. quit coffee  Lunch: string cheese or beef jerky.  processed meat makes stomach sick.  Dinner: steak, chicken with vegetables steamed frozen mixed vegetables  Snacks: beef jerky. likes to make Rice Krispies.  Other: was drinking juice to quit soda.  drinking more water now.  Beverages: Water  Has patient met with a dietitian in the past?: Yes    Being Active:  Being Active Assessed Today: No    Monitoring:  Monitoring Assessed Today: Yes  Did patient bring glucose meter to  appointment? : Yes  Times checking blood sugar at home (number): 2  Times checking blood sugar at home (per): Day  Blood glucose trend: Decreasing    Date Breakfast  Lunch  Dinner  Bedtime    Before After Before After Before After    11/5 142         11/4 140     127    11/3 128     113    11/2 135     118    11/1 145     123    10/31 145     136    10/30 140     115          14 days on full metformin dose   Down to half soda; now smoking more- gained a little back from COVID.  Lost 10-12# with COVID.    Taking Medications:  Diabetes Medication(s)     Biguanides       metFORMIN (GLUCOPHAGE) 500 MG tablet    Take 1 tablet bid with meals for 2 weeks then increase to 2 tablets twice daily     Patient not taking: Reported on 10/7/2021    Sulfonylureas       glipiZIDE (GLUCOTROL XL) 5 MG 24 hr tablet    Take 1 tablet (5 mg) by mouth daily     Patient not taking: Reported on 10/7/2021          Taking Medication Assessed Today: Yes  Current Treatments: Oral Medication (taken by mouth)  Problems taking diabetes medications regularly?: No  Diabetes medication side effects?: Yes (nausea)    Problem Solving:  Problem Solving Assessed Today: Yes  Is the patient at risk for hypoglycemia?: No              Reducing Risks:  Reducing Risks Assessed Today: Yes  Diabetes Risks: Age over 45 years, Family History  CAD Risks: Diabetes Mellitus, Dyslipidemia, Male sex, Hypertension, Family history, Tobacco exposure  Has dilated eye exam at least once a year?: No (Has appointment coming up)    Healthy Coping:  Healthy Coping Assessed Today: Yes  Emotional response to diabetes: Ready to learn, Acceptance  Informal Support system:: Family  Stage of change: ACTION (Actively working towards change)  Patient Activation Measure Survey Score:  No flowsheet data found.    Diabetes knowledge and skills assessment:   Patient is knowledgeable in diabetes management concepts related to: Monitoring and Taking Medication    Patient needs further  education on the following diabetes management concepts: Healthy Eating, Monitoring and Taking Medication    Based on learning assessment above, most appropriate setting for further diabetes education would be: Individual setting.      INTERVENTIONS:    Education provided today on:  AADE Self-Care Behaviors:  Diabetes Pathophysiology  Healthy Eating: carbohydrate counting, portion control, plate planning method and label reading  Being Active: describe appropriate activity program  Monitoring: individual blood glucose targets and frequency of monitoring  Taking Medication: action of prescribed medication and when to take medications    Opportunities for ongoing education and support in diabetes-self management were discussed.    Pt verbalized understanding of concepts discussed and recommendations provided today.       Education Materials Provided:  No new materials provided today      ASSESSMENT:  Pt doing well overall.  Blood sugars are coming down with the exception of still some elevated in the morning.  Pt has been taking 2000 mg metformin for approx 14 days.  Likely fasting blood sugars will continue to improve.  Pt never did start Glipizide XL as pt states he was told by provider not to.  Reviewed topics related to new dx education.  Discussed adding a snack at night for 5 days and 5 days without to see if that helps fasting blood sugars.  Pt would also like to try the extended release version of metformin as having some nausea with regular release.  Order pended to PCP.  Plan to follow up in about 1 month.        Patient's most recent   Lab Results   Component Value Date    A1C 9.0 09/28/2021    is not meeting goal of <7.0    PLAN  See Patient Instructions for co-developed, patient-stated behavior change goals.  AVS printed and provided to patient today. See Follow-Up section for recommended follow-up.      Time Spent: 60 minutes  Encounter Type: Individual    Any diabetes medication dose changes were made  via the CDE Protocol and Collaborative Practice Agreement with the patient's referring provider. A copy of this encounter was shared with the provider.

## 2021-11-05 NOTE — TELEPHONE ENCOUNTER
Dr Schaeffer,    Pt feeling nauseated from the regular metformin.  He would like to try the extended release version.  I've pended the order please sign if in agreement and discontinue regular metformin.    Thank you.    Kerri Solano RD [facility]  [phone]

## 2021-11-05 NOTE — PROGRESS NOTES
"Diabetes Self-Management Education & Support    Presents for: Follow-up    SUBJECTIVE/OBJECTIVE:  Presents for: Follow-up  Accompanied by: Self  Diabetes education in the past 24mo: Yes  Focus of Visit: Monitoring, Healthy Eating, Taking Medication, Diabetes Pathophysiology  Diabetes type: Type 2  Date of diagnosis: September 2021  Disease course: Improving  How confident are you filling out medical forms by yourself:: Not Assessed  Difficulty affording diabetes medication?: No  Difficulty affording diabetes testing supplies?: No  Other concerns:: None  Cultural Influences/Ethnic Background:  Not  or     Diabetes Symptoms & Complications:  Fatigue: Yes  Neuropathy: No  Polydipsia: No (Dry mouth - but says on lots of new meds recently)  Polyphagia: No  Polyuria: No  Visual change: Yes (Thinks may be related to stroke)  Symptom course: Improving  Weight trend: Stable  Complications assessed today?: No    Patient Problem List and Family Medical History reviewed for relevant medical history, current medical status, and diabetes risk factors.    Vitals:  There were no vitals taken for this visit.  Estimated body mass index is 27.98 kg/m  as calculated from the following:    Height as of 9/28/21: 1.854 m (6' 0.99\").    Weight as of 9/28/21: 96.2 kg (212 lb).   Last 3 BP:   BP Readings from Last 3 Encounters:   09/28/21 (!) 132/94   07/09/21 (!) 152/94   06/29/21 132/74       History   Smoking Status     Current Every Day Smoker     Packs/day: 0.50     Types: Cigarettes   Smokeless Tobacco     Never Used       Labs:  Lab Results   Component Value Date    A1C 9.0 09/28/2021     Lab Results   Component Value Date     09/28/2021     07/09/2021     Lab Results   Component Value Date    LDL 81 07/09/2021     HDL Cholesterol   Date Value Ref Range Status   07/09/2021 35 (L) >39 mg/dL Final   ]  GFR Estimate   Date Value Ref Range Status   09/28/2021 >90 >60 mL/min/1.73m2 Final     Comment:     As of " July 11, 2021, eGFR is calculated by the CKD-EPI creatinine equation, without race adjustment. eGFR can be influenced by muscle mass, exercise, and diet. The reported eGFR is an estimation only and is only applicable if the renal function is stable.   07/09/2021 >90 >60 mL/min/[1.73_m2] Final     Comment:     Non  GFR Calc  Starting 12/18/2018, serum creatinine based estimated GFR (eGFR) will be   calculated using the Chronic Kidney Disease Epidemiology Collaboration   (CKD-EPI) equation.       GFR Estimate If Black   Date Value Ref Range Status   07/09/2021 >90 >60 mL/min/[1.73_m2] Final     Comment:      GFR Calc  Starting 12/18/2018, serum creatinine based estimated GFR (eGFR) will be   calculated using the Chronic Kidney Disease Epidemiology Collaboration   (CKD-EPI) equation.       Lab Results   Component Value Date    CR 0.83 09/28/2021    CR 0.95 07/09/2021     No results found for: MICROALBUMIN    Healthy Eating:  Healthy Eating Assessed Today: No  Meal planning/habits: Avoiding sweets  Meals include: Breakfast, Dinner, Afternoon Snack  Breakfast: has to eat breakfast for BP med. has 3 eggs with parmesan cheese. no toast or antonio/sausage, or breakfast bowl. quit coffee  Lunch: string cheese or beef jerky.  processed meat makes stomach sick.  Dinner: steak, chicken with vegetables steamed frozen mixed vegetables  Snacks: beef jerky. likes to make Rice Krispies.  Other: was drinking juice to quit soda.  drinking more water now.  Beverages: Water  Has patient met with a dietitian in the past?: Yes    Being Active:  Being Active Assessed Today: No    Monitoring:  Monitoring Assessed Today: Yes  Did patient bring glucose meter to appointment? : Yes  Times checking blood sugar at home (number): 2  Times checking blood sugar at home (per): Day  Blood glucose trend: Decreasing    Date Breakfast  Lunch  Dinner  Bedtime    Before After Before After Before After    11/5 142         11/4  140     127    11/3 128     113    11/2 135     118    11/1 145     123    10/31 145     136    10/30 140     115          14 days on full metformin dose   Down to half soda; now smoking more- gained a little back from COVID.  Lost 10-12# with COVID.    Taking Medications:  Diabetes Medication(s)     Biguanides       metFORMIN (GLUCOPHAGE) 500 MG tablet    Take 1 tablet bid with meals for 2 weeks then increase to 2 tablets twice daily     Patient not taking: Reported on 10/7/2021    Sulfonylureas       glipiZIDE (GLUCOTROL XL) 5 MG 24 hr tablet    Take 1 tablet (5 mg) by mouth daily     Patient not taking: Reported on 10/7/2021          Taking Medication Assessed Today: Yes  Current Treatments: Oral Medication (taken by mouth)  Problems taking diabetes medications regularly?: No  Diabetes medication side effects?: Yes (nausea)    Problem Solving:  Problem Solving Assessed Today: Yes  Is the patient at risk for hypoglycemia?: No              Reducing Risks:  Reducing Risks Assessed Today: Yes  Diabetes Risks: Age over 45 years, Family History  CAD Risks: Diabetes Mellitus, Dyslipidemia, Male sex, Hypertension, Family history, Tobacco exposure  Has dilated eye exam at least once a year?: No (Has appointment coming up)    Healthy Coping:  Healthy Coping Assessed Today: Yes  Emotional response to diabetes: Ready to learn, Acceptance  Informal Support system:: Family  Stage of change: ACTION (Actively working towards change)  Patient Activation Measure Survey Score:  No flowsheet data found.    Diabetes knowledge and skills assessment:   Patient is knowledgeable in diabetes management concepts related to: Monitoring and Taking Medication    Patient needs further education on the following diabetes management concepts: Healthy Eating, Monitoring and Taking Medication    Based on learning assessment above, most appropriate setting for further diabetes education would be: Individual setting.      INTERVENTIONS:    Education  "provided today on:  AADE Self-Care Behaviors:  Diabetes Pathophysiology  Healthy Eating: carbohydrate counting, portion control, plate planning method and label reading  Being Active: describe appropriate activity program  Monitoring: individual blood glucose targets and frequency of monitoring  Taking Medication: action of prescribed medication and when to take medications    Opportunities for ongoing education and support in diabetes-self management were discussed.    Pt verbalized understanding of concepts discussed and recommendations provided today.       Education Materials Provided:  No new materials provided today      ASSESSMENT:  Pt doing well overall.  Blood sugars are coming down with the exception of still some elevated in the morning.  Pt has been taking 2000 mg metformin for approx 14 days.  Likely fasting blood sugars will continue to improve.  Pt never did start Glipizide XL as pt states he was told by provider not to.  Reviewed topics related to new dx education.  Discussed adding a snack at night for 5 days and 5 days without to see if that helps fasting blood sugars.  Pt would also like to try the extended release version of metformin as having some nausea with regular release.  Order pended to PCP.  Plan to follow up in about 1 month.    Of note, pt mentions since reducing pop and food, he is smoking more.  When asked if he would like some assistance with quitting, pt mentions he is not sure if he is really ready to quit at this time.  Says he doesn't feel it's an addiction, but more of a \"habit.\"  Feels it's more a motivation thing more than anything.  Declines need for help with this.  Let him know help is available if he decides he wants to quit - encouraged to speak with his Dr.  Also provided national quit line website.  Pt appreciative of information.        Patient's most recent   Lab Results   Component Value Date    A1C 9.0 09/28/2021    is not meeting goal of <7.0    PLAN  See Patient " Instructions for co-developed, patient-stated behavior change goals.  AVS printed and provided to patient today. See Follow-Up section for recommended follow-up.      Time Spent: 60 minutes  Encounter Type: Individual    Any diabetes medication dose changes were made via the CDE Protocol and Collaborative Practice Agreement with the patient's referring provider. A copy of this encounter was shared with the provider.

## 2021-11-05 NOTE — PATIENT INSTRUCTIONS
1).  45-75 grams of carb per meal; 15-30 grams per snack  2).  Continue to check your blood sugars at least 2 times per day.  Fasting, before meals, 2 hours after start of a meal ( before meals; less than 180 (2) hours after start of a meal)    3).  I will send you a message to your Dr about changing to extended release metformin    4).  You can try a snack before bed with a small amount of carbohydrate 15-20 grams of carbohydrate with protein and see if this helps your fasting blood sugars.

## 2021-11-08 RX ORDER — METFORMIN HCL 500 MG
TABLET, EXTENDED RELEASE 24 HR ORAL
Qty: 120 TABLET | Refills: 0 | Status: SHIPPED | OUTPATIENT
Start: 2021-11-08 | End: 2021-12-06

## 2021-11-08 NOTE — TELEPHONE ENCOUNTER
Kerri Solano, RD  Cary Schaeffer MD; [facility] 3 days ago     KB    Change to extended release metformin    Routing comment

## 2021-11-09 DIAGNOSIS — I10 HYPERTENSION, UNSPECIFIED TYPE: ICD-10-CM

## 2021-11-09 NOTE — TELEPHONE ENCOUNTER
Called and updated pt that metformin xr has been sent into [facility] to be filled.    Kerri Solano RD [facility]  [phone]

## 2021-11-10 RX ORDER — METOPROLOL SUCCINATE 25 MG/1
TABLET, EXTENDED RELEASE ORAL
Qty: 90 TABLET | Refills: 0 | OUTPATIENT
Start: 2021-11-10

## 2021-11-10 NOTE — TELEPHONE ENCOUNTER
Toprol XL  Denied, sent 10/1/2021 for 90 tablets and 1 refill to this pharmacy    Kathleen Nunez RN

## 2021-11-30 ENCOUNTER — ALLIED HEALTH/NURSE VISIT (OUTPATIENT)
Dept: EDUCATION SERVICES | Facility: OTHER | Age: 51
End: 2021-11-30
Payer: COMMERCIAL

## 2021-11-30 DIAGNOSIS — E11.65 TYPE 2 DIABETES MELLITUS WITH HYPERGLYCEMIA, WITHOUT LONG-TERM CURRENT USE OF INSULIN (H): Primary | ICD-10-CM

## 2021-11-30 PROCEDURE — G0108 DIAB MANAGE TRN  PER INDIV: HCPCS | Performed by: DIETITIAN, REGISTERED

## 2021-11-30 NOTE — PATIENT INSTRUCTIONS
1).  Make follow up with Dr Schaeffer for Diabetes Visit end of December/early January  2).  Keep on your daily walks!  Great job!  3).  God job on reducing your smoking : )  4).  45-60 grams of carb per meal; 0-25 grams per snack.  Occasionally up to 75 grams of carb per meal    Can also try adding fiber using sugar free metamucil (or KONSYL brand (target) is just Psyllium husk, no art sweetner) or benefiber      5).  Keep taking your 4 tablets of extended release metformin per day

## 2021-11-30 NOTE — PROGRESS NOTES
"Diabetes Self-Management Education & Support    Presents for:      SUBJECTIVE/OBJECTIVE:  Diabetes education in the past 24mo: Yes  Diabetes type: Type 2  Disease course: Improving  Cultural Influences/Ethnic Background:  Not  or       Diabetes Symptoms & Complications:  Fatigue: Yes  Neuropathy: No  Polydipsia: Sometimes  Polyphagia: Sometimes  Polyuria: Yes  Visual change: Sometimes  Slow healing wounds: No  Autonomic neuropathy: No  CVA: Other  Heart disease: No  Nephropathy: No  Peripheral neuropathy: No  Peripheral Vascular Disease: No  Retinopathy: Yes  Sexual dysfunction: Other    Patient Problem List and Family Medical History reviewed for relevant medical history, current medical status, and diabetes risk factors.    Vitals:  There were no vitals taken for this visit.  Estimated body mass index is 27.98 kg/m  as calculated from the following:    Height as of 9/28/21: 1.854 m (6' 0.99\").    Weight as of 9/28/21: 96.2 kg (212 lb).   Last 3 BP:   BP Readings from Last 3 Encounters:   09/28/21 (!) 132/94   07/09/21 (!) 152/94   06/29/21 132/74       History   Smoking Status     Current Every Day Smoker     Packs/day: 0.50     Types: Cigarettes   Smokeless Tobacco     Never Used       Labs:  Lab Results   Component Value Date    A1C 9.0 09/28/2021     Lab Results   Component Value Date     09/28/2021     07/09/2021     Lab Results   Component Value Date    LDL 81 07/09/2021     HDL Cholesterol   Date Value Ref Range Status   07/09/2021 35 (L) >39 mg/dL Final   ]  GFR Estimate   Date Value Ref Range Status   09/28/2021 >90 >60 mL/min/1.73m2 Final     Comment:     As of July 11, 2021, eGFR is calculated by the CKD-EPI creatinine equation, without race adjustment. eGFR can be influenced by muscle mass, exercise, and diet. The reported eGFR is an estimation only and is only applicable if the renal function is stable.   07/09/2021 >90 >60 mL/min/[1.73_m2] Final     Comment:     Non  " "American GFR Calc  Starting 12/18/2018, serum creatinine based estimated GFR (eGFR) will be   calculated using the Chronic Kidney Disease Epidemiology Collaboration   (CKD-EPI) equation.       GFR Estimate If Black   Date Value Ref Range Status   07/09/2021 >90 >60 mL/min/[1.73_m2] Final     Comment:      GFR Calc  Starting 12/18/2018, serum creatinine based estimated GFR (eGFR) will be   calculated using the Chronic Kidney Disease Epidemiology Collaboration   (CKD-EPI) equation.       Lab Results   Component Value Date    CR 0.83 09/28/2021    CR 0.95 07/09/2021     No results found for: MICROALBUMIN    Healthy Eating:  Healthy Eating Assessed Today: Yes  Cultural/Tenriism diet restrictions?: No  Meal planning/habits: Carb counting,Smaller portions  How many times a week on average do you eat food made away from home (restaurant/take-out)?: 1  Meals include: Breakfast,Dinner,Evening Snack  Breakfast: 0900 Eggs (every morning) 2, sandwich, or toast (2); \"sick of eggs.\" Not a fan of oatmeal.  Does not drink milk.  Could do cottage and fruit; Oat or sourdough bread.  Wants to do [place] toast for breakfast.  Lunch: 9505-0115 doesn't typically eat this meal.  Will get sliced meat from grocery store -aware it is high in sodium.  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  Goes for a walk after dinner around [place])  Days per week of moderate to strenuous exercise (like a brisk walk): 7  On average, minutes per day of exercise at this level: 30  How intense was your typical " exercise? : Moderate (like brisk walking)  Exercise Minutes per Week: 210  Barrier to exercise: None    Monitoring:  Monitoring Assessed Today: Yes  Did patient bring glucose meter to appointment? : Yes  Blood Glucose Meter: Accu-chek  Times checking blood sugar at home (number): 2  Times checking blood sugar at home (per): Day  Blood glucose trend: Decreasing    Date Breakfast  Lunch  Dinner  Bedtime    Before After Before After Before After    11/30 144         11/29 134     98* 3 hours    11/28 124     113    11/27 156     165    11/26 139     131    11/25 149     152    11/24 145     112      Date Breakfast  Lunch  Dinner  Bedtime    Before After Before After Before After    11/23 127     108    11/22 137     115    11/21 154     129    11/20 131     133    11/19 130     139    11/18 126     149    11/17 129     106          Taking Medications:  Diabetes Medication(s)     Biguanides       metFORMIN (GLUCOPHAGE-XR) 500 MG 24 hr tablet    Take 2 tablets with breakfast and 2 tablets with dinner meal    Sulfonylureas       glipiZIDE (GLUCOTROL XL) 5 MG 24 hr tablet    Take 1 tablet (5 mg) by mouth daily     Patient not taking: Reported on 10/7/2021          Taking Medication Assessed Today: Yes  Current Treatments: Oral Medication (taken by mouth)  Problems taking diabetes medications regularly?: No  Diabetes medication side effects?: Yes (feels hot in head and stomach nir upset, no diarrhea.  Noticed better on XR, but still there.  wants to keep on it)    Problem Solving:  Problem Solving Assessed Today: Yes  Is the patient at risk for hypoglycemia?: No              Reducing Risks:  Reducing Risks Assessed Today: Yes  Diabetes Risks: Age over 45 years,Family History  CAD Risks: Diabetes Mellitus,Hypertension,Tobacco exposure,Male sex  Has dilated eye exam at least once a year?: Yes  Sees dentist every 6 months?: No  Feet checked by healthcare provider in the last year?: Yes    Healthy Coping:  Healthy Coping  Assessed Today: Yes  Emotional response to diabetes: Ready to learn,Acceptance  Informal Support system:: Family,Friends,Significant other  Stage of change: ACTION (Actively working towards change)  Patient Activation Measure Survey Score:  No flowsheet data found.    Diabetes knowledge and skills assessment:   Patient is knowledgeable in diabetes management concepts related to: Monitoring and Taking Medication    Patient needs further education on the following diabetes management concepts: Healthy Eating and Monitoring    Based on learning assessment above, most appropriate setting for further diabetes education would be: Individual setting.      INTERVENTIONS:    Education provided today on:  AADE Self-Care Behaviors:  Diabetes Pathophysiology  Healthy Eating: carbohydrate counting, consistency in amount, composition, and timing of food intake, portion control, plate planning method and label reading  Being Active: relationship to blood glucose and describe appropriate activity program  Monitoring: log and interpret results, individual blood glucose targets and frequency of monitoring  Taking Medication: action of prescribed medication, side effects of prescribed medications and when to take medications    Opportunities for ongoing education and support in diabetes-self management were discussed.    Pt verbalized understanding of concepts discussed and recommendations provided today.       Education Materials Provided:  [employer] Healthy Living with Diabetes Book, Carbohydrate Counting and My Plate Planner, Nutrition Label      ASSESSMENT:  Pt is doing well.  All of post meal blood sugars in target, approx 56% of fasting blood sugars in target.  Tolerating metformin xr better than the metformin, still some GI discomfort, but not intolerable and wants to continue it.  Discussed gradually increasing fiber.  Plan to follow up with PCP in December to repeat A1C.  Will focus on heart health education next  visit.        Patient's most recent   Lab Results   Component Value Date    A1C 9.0 09/28/2021    is not meeting goal of <7.0    PLAN  See Patient Instructions for co-developed, patient-stated behavior change goals.  AVS printed and provided to patient today. See Follow-Up section for recommended follow-up.      Time Spent: 60 minutes  Encounter Type: Individual    Any diabetes medication dose changes were made via the CDE Protocol and Collaborative Practice Agreement with the patient's referring provider. A copy of this encounter was shared with the provider.      CINDY Vang [facility]  [phone]

## 2021-11-30 NOTE — Clinical Note
Blood sugars are improving.  Encouraged pt to make follow up with you in late Dec/Early Jan to check A1C again.  Has follow up scheduled with me after that.

## 2021-11-30 NOTE — LETTER
"    11/30/2021         RE: Salvador Crespo  [address] [facility] [address]  [place] [address]        Dear Colleague,    Thank you for referring your patient, Salvador Crespo, to the [facility]. Please see a copy of my visit note below.    Diabetes Self-Management Education & Support    Presents for:      SUBJECTIVE/OBJECTIVE:  Diabetes education in the past 24mo: Yes  Diabetes type: Type 2  Disease course: Improving  Cultural Influences/Ethnic Background:  Not  or       Diabetes Symptoms & Complications:  Fatigue: Yes  Neuropathy: No  Polydipsia: Sometimes  Polyphagia: Sometimes  Polyuria: Yes  Visual change: Sometimes  Slow healing wounds: No  Autonomic neuropathy: No  CVA: Other  Heart disease: No  Nephropathy: No  Peripheral neuropathy: No  Peripheral Vascular Disease: No  Retinopathy: Yes  Sexual dysfunction: Other    Patient Problem List and Family Medical History reviewed for relevant medical history, current medical status, and diabetes risk factors.    Vitals:  There were no vitals taken for this visit.  Estimated body mass index is 27.98 kg/m  as calculated from the following:    Height as of 9/28/21: 1.854 m (6' 0.99\").    Weight as of 9/28/21: 96.2 kg (212 lb).   Last 3 BP:   BP Readings from Last 3 Encounters:   09/28/21 (!) 132/94   07/09/21 (!) 152/94   06/29/21 132/74       History   Smoking Status     Current Every Day Smoker     Packs/day: 0.50     Types: Cigarettes   Smokeless Tobacco     Never Used       Labs:  Lab Results   Component Value Date    A1C 9.0 09/28/2021     Lab Results   Component Value Date     09/28/2021     07/09/2021     Lab Results   Component Value Date    LDL 81 07/09/2021     HDL Cholesterol   Date Value Ref Range Status   07/09/2021 35 (L) >39 mg/dL Final   ]  GFR Estimate   Date Value Ref Range Status   09/28/2021 >90 >60 mL/min/1.73m2 Final     Comment:     As of July 11, 2021, eGFR is calculated by the CKD-EPI creatinine equation, " "without race adjustment. eGFR can be influenced by muscle mass, exercise, and diet. The reported eGFR is an estimation only and is only applicable if the renal function is stable.   07/09/2021 >90 >60 mL/min/[1.73_m2] Final     Comment:     Non  GFR Calc  Starting 12/18/2018, serum creatinine based estimated GFR (eGFR) will be   calculated using the Chronic Kidney Disease Epidemiology Collaboration   (CKD-EPI) equation.       GFR Estimate If Black   Date Value Ref Range Status   07/09/2021 >90 >60 mL/min/[1.73_m2] Final     Comment:      GFR Calc  Starting 12/18/2018, serum creatinine based estimated GFR (eGFR) will be   calculated using the Chronic Kidney Disease Epidemiology Collaboration   (CKD-EPI) equation.       Lab Results   Component Value Date    CR 0.83 09/28/2021    CR 0.95 07/09/2021     No results found for: MICROALBUMIN    Healthy Eating:  Healthy Eating Assessed Today: Yes  Cultural/Synagogue diet restrictions?: No  Meal planning/habits: Carb counting,Smaller portions  How many times a week on average do you eat food made away from home (restaurant/take-out)?: 1  Meals include: Breakfast,Dinner,Evening Snack  Breakfast: 0900 Eggs (every morning) 2, sandwich, or toast (2); \"sick of eggs.\" Not a fan of oatmeal.  Does not drink milk.  Could do cottage and fruit; Oat or sourdough bread.  Wants to do Barb[place] toast for breakfast.  Lunch: 1901-5051 doesn't typically eat this meal.  Will get sliced meat from grocery store -aware it is high in sodium.  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  Goes for a walk after dinner around resort)  Days per week of moderate to strenuous exercise (like a brisk walk): 7  On average, minutes per day of exercise at this level: 30  How intense was your typical exercise? : Moderate (like brisk walking)  Exercise Minutes per Week: 210  Barrier to exercise: None    Monitoring:  Monitoring Assessed Today: Yes  Did patient bring glucose meter to appointment? : Yes  Blood Glucose Meter: Accu-chek  Times checking blood sugar at home (number): 2  Times checking blood sugar at home (per): Day  Blood glucose trend: Decreasing    Date Breakfast  Lunch  Dinner  Bedtime    Before After Before After Before After    11/30 144         11/29 134     98* 3 hours    11/28 124     113    11/27 156     165    11/26 139     131    11/25 149     152    11/24 145     112      Date Breakfast  Lunch  Dinner  Bedtime    Before After Before After Before After    11/23 127     108    11/22 137     115    11/21 154     129    11/20 131     133    11/19 130     139    11/18 126     149    11/17 129     106          Taking Medications:  Diabetes Medication(s)     Biguanides       metFORMIN (GLUCOPHAGE-XR) 500 MG 24 hr tablet    Take 2 tablets with breakfast and 2 tablets with dinner meal    Sulfonylureas       glipiZIDE (GLUCOTROL XL) 5 MG 24 hr tablet    Take 1 tablet (5 mg) by mouth daily     Patient not taking: Reported on 10/7/2021          Taking Medication Assessed Today: Yes  Current Treatments: Oral Medication (taken by mouth)  Problems taking diabetes medications regularly?: No  Diabetes medication side effects?: Yes (feels hot in head and stomach nir upset, no diarrhea.  Noticed better on XR, but still there.  wants to keep on it)    Problem Solving:  Problem Solving Assessed Today: Yes  Is the patient at risk for hypoglycemia?: No              Reducing Risks:  Reducing Risks Assessed Today: Yes  Diabetes Risks: Age over 45 years,Family  History  CAD Risks: Diabetes Mellitus,Hypertension,Tobacco exposure,Male sex  Has dilated eye exam at least once a year?: Yes  Sees dentist every 6 months?: No  Feet checked by healthcare provider in the last year?: Yes    Healthy Coping:  Healthy Coping Assessed Today: Yes  Emotional response to diabetes: Ready to learn,Acceptance  Informal Support system:: Family,Friends,Significant other  Stage of change: ACTION (Actively working towards change)  Patient Activation Measure Survey Score:  No flowsheet data found.    Diabetes knowledge and skills assessment:   Patient is knowledgeable in diabetes management concepts related to: Monitoring and Taking Medication    Patient needs further education on the following diabetes management concepts: Healthy Eating and Monitoring    Based on learning assessment above, most appropriate setting for further diabetes education would be: Individual setting.      INTERVENTIONS:    Education provided today on:  AADE Self-Care Behaviors:  Diabetes Pathophysiology  Healthy Eating: carbohydrate counting, consistency in amount, composition, and timing of food intake, portion control, plate planning method and label reading  Being Active: relationship to blood glucose and describe appropriate activity program  Monitoring: log and interpret results, individual blood glucose targets and frequency of monitoring  Taking Medication: action of prescribed medication, side effects of prescribed medications and when to take medications    Opportunities for ongoing education and support in diabetes-self management were discussed.    Pt verbalized understanding of concepts discussed and recommendations provided today.       Education Materials Provided:  [employer] Healthy Living with Diabetes Book, Carbohydrate Counting and My Plate Planner, Nutrition Label      ASSESSMENT:  Pt is doing well.  All of post meal blood sugars in target, approx 56% of fasting blood sugars in target.   Tolerating metformin xr better than the metformin, still some GI discomfort, but not intolerable and wants to continue it.  Discussed gradually increasing fiber.  Plan to follow up with PCP in December to repeat A1C.  Will focus on heart health education next visit.        Patient's most recent   Lab Results   Component Value Date    A1C 9.0 09/28/2021    is not meeting goal of <7.0    PLAN  See Patient Instructions for co-developed, patient-stated behavior change goals.  AVS printed and provided to patient today. See Follow-Up section for recommended follow-up.      Time Spent: 60 minutes  Encounter Type: Individual    Any diabetes medication dose changes were made via the CDE Protocol and Collaborative Practice Agreement with the patient's referring provider. A copy of this encounter was shared with the provider.      Kerri Solano RD [facility]  [phone]

## 2021-12-03 DIAGNOSIS — E11.65 TYPE 2 DIABETES MELLITUS WITH HYPERGLYCEMIA, WITHOUT LONG-TERM CURRENT USE OF INSULIN (H): ICD-10-CM

## 2021-12-03 DIAGNOSIS — I10 HYPERTENSION, UNSPECIFIED TYPE: ICD-10-CM

## 2021-12-06 RX ORDER — METFORMIN HCL 500 MG
TABLET, EXTENDED RELEASE 24 HR ORAL
Qty: 120 TABLET | Refills: 0 | Status: SHIPPED | OUTPATIENT
Start: 2021-12-06 | End: 2022-01-05

## 2021-12-06 RX ORDER — ATORVASTATIN CALCIUM 20 MG/1
TABLET, FILM COATED ORAL
Qty: 30 TABLET | Refills: 0 | Status: SHIPPED | OUTPATIENT
Start: 2021-12-06 | End: 2022-02-08

## 2021-12-08 DIAGNOSIS — E11.65 TYPE 2 DIABETES MELLITUS WITH HYPERGLYCEMIA, WITHOUT LONG-TERM CURRENT USE OF INSULIN (H): ICD-10-CM

## 2022-01-04 ENCOUNTER — OFFICE VISIT (OUTPATIENT)
Dept: FAMILY MEDICINE | Facility: CLINIC | Age: 52
End: 2022-01-04
Payer: COMMERCIAL

## 2022-01-04 VITALS
WEIGHT: 212.6 LBS | HEART RATE: 105 BPM | SYSTOLIC BLOOD PRESSURE: 124 MMHG | OXYGEN SATURATION: 98 % | DIASTOLIC BLOOD PRESSURE: 80 MMHG | RESPIRATION RATE: 14 BRPM | BODY MASS INDEX: 28.06 KG/M2 | TEMPERATURE: 97.7 F

## 2022-01-04 DIAGNOSIS — Z53.9 ERRONEOUS ENCOUNTER--DISREGARD: Primary | ICD-10-CM

## 2022-01-04 NOTE — PROGRESS NOTES
{PROVIDER CHARTING PREFERENCE:975502}    Subjective   Salvador is a 51 year old who presents for the following health issues     HPI     Diabetes Follow-up    How often are you checking your blood sugar? Two times daily  Blood sugar testing frequency justification:  Uncontrolled diabetes  What time of day are you checking your blood sugars (select all that apply)?  Before and after meals  Have you had any blood sugars above 200?  No  Have you had any blood sugars below 70?  No    What symptoms do you notice when your blood sugar is low?  None and Not applicable    What concerns do you have today about your diabetes? None and Blood sugar is often over 200     Do you have any of these symptoms? (Select all that apply)  No numbness or tingling in feet.  No redness, sores or blisters on feet.  No complaints of excessive thirst.  No reports of blurry vision.  No significant changes to weight.    Have you had a diabetic eye exam in the last 12 months? No        BP Readings from Last 2 Encounters:   09/28/21 (!) 132/94   07/09/21 (!) 152/94     Hemoglobin A1C (%)   Date Value   09/28/2021 9.0 (H)     LDL Cholesterol Calculated (mg/dL)   Date Value   07/09/2021 81       {Reference  Diabetes Management Resources :930652}    {Reference  Diabetes Log - 7 days :446583}      How many servings of fruits and vegetables do you eat daily?  0-1    On average, how many sweetened beverages do you drink each day (Examples: soda, juice, sweet tea, etc.  Do NOT count diet or artificially sweetened beverages)?   2    How many days per week do you exercise enough to make your heart beat faster? 3 or less    How many minutes a day do you exercise enough to make your heart beat faster? 9 or less    How many days per week do you miss taking your medication? 0    {additonal problems for provider to add (Optional):617834}    Review of Systems   {ROS COMP (Optional):857398}      Objective    There were no vitals taken for this visit.  There is  no height or weight on file to calculate BMI.  Physical Exam   {Exam List (Optional):820406}    {Diagnostic Test Results (Optional):255341}    {AMBULATORY ATTESTATION (Optional):821199}

## 2022-01-11 ENCOUNTER — OFFICE VISIT (OUTPATIENT)
Dept: FAMILY MEDICINE | Facility: CLINIC | Age: 52
End: 2022-01-11
Payer: COMMERCIAL

## 2022-01-11 VITALS
OXYGEN SATURATION: 97 % | DIASTOLIC BLOOD PRESSURE: 78 MMHG | BODY MASS INDEX: 28.11 KG/M2 | HEART RATE: 85 BPM | RESPIRATION RATE: 16 BRPM | SYSTOLIC BLOOD PRESSURE: 120 MMHG | TEMPERATURE: 97.6 F | WEIGHT: 213 LBS

## 2022-01-11 DIAGNOSIS — F17.200 TOBACCO USE DISORDER: ICD-10-CM

## 2022-01-11 DIAGNOSIS — Z12.11 COLON CANCER SCREENING: ICD-10-CM

## 2022-01-11 DIAGNOSIS — Z86.73 HISTORY OF TIA (TRANSIENT ISCHEMIC ATTACK) AND STROKE: ICD-10-CM

## 2022-01-11 DIAGNOSIS — Z28.21 VACCINATION REFUSED BY PATIENT: ICD-10-CM

## 2022-01-11 DIAGNOSIS — I10 HYPERTENSION, UNSPECIFIED TYPE: ICD-10-CM

## 2022-01-11 DIAGNOSIS — E87.6 HYPOKALEMIA: ICD-10-CM

## 2022-01-11 DIAGNOSIS — K92.1 BLOODY STOOL: ICD-10-CM

## 2022-01-11 DIAGNOSIS — E11.65 TYPE 2 DIABETES MELLITUS WITH HYPERGLYCEMIA, WITHOUT LONG-TERM CURRENT USE OF INSULIN (H): Primary | ICD-10-CM

## 2022-01-11 LAB
ALBUMIN SERPL-MCNC: 3.6 G/DL (ref 3.4–5)
ALP SERPL-CCNC: 66 U/L (ref 40–150)
ALT SERPL W P-5'-P-CCNC: 43 U/L (ref 0–70)
ANION GAP SERPL CALCULATED.3IONS-SCNC: 7 MMOL/L (ref 3–14)
AST SERPL W P-5'-P-CCNC: 23 U/L (ref 0–45)
BILIRUB SERPL-MCNC: 0.4 MG/DL (ref 0.2–1.3)
BUN SERPL-MCNC: 18 MG/DL (ref 7–30)
CALCIUM SERPL-MCNC: 9.9 MG/DL (ref 8.5–10.1)
CHLORIDE BLD-SCNC: 101 MMOL/L (ref 94–109)
CO2 SERPL-SCNC: 32 MMOL/L (ref 20–32)
CREAT SERPL-MCNC: 0.79 MG/DL (ref 0.66–1.25)
CREAT UR-MCNC: 93 MG/DL
GFR SERPL CREATININE-BSD FRML MDRD: >90 ML/MIN/1.73M2
GLUCOSE BLD-MCNC: 110 MG/DL (ref 70–99)
HBA1C MFR BLD: 6.6 % (ref 0–5.6)
MICROALBUMIN UR-MCNC: 10 MG/L
MICROALBUMIN/CREAT UR: 10.75 MG/G CR (ref 0–17)
POTASSIUM BLD-SCNC: 2.9 MMOL/L (ref 3.4–5.3)
PROT SERPL-MCNC: 7.8 G/DL (ref 6.8–8.8)
SODIUM SERPL-SCNC: 140 MMOL/L (ref 133–144)

## 2022-01-11 PROCEDURE — 36415 COLL VENOUS BLD VENIPUNCTURE: CPT | Performed by: FAMILY MEDICINE

## 2022-01-11 PROCEDURE — 99207 PR FOOT EXAM NO CHARGE: CPT | Performed by: FAMILY MEDICINE

## 2022-01-11 PROCEDURE — 83036 HEMOGLOBIN GLYCOSYLATED A1C: CPT | Performed by: FAMILY MEDICINE

## 2022-01-11 PROCEDURE — 82043 UR ALBUMIN QUANTITATIVE: CPT | Performed by: FAMILY MEDICINE

## 2022-01-11 PROCEDURE — 80053 COMPREHEN METABOLIC PANEL: CPT | Performed by: FAMILY MEDICINE

## 2022-01-11 PROCEDURE — 99214 OFFICE O/P EST MOD 30 MIN: CPT | Performed by: FAMILY MEDICINE

## 2022-01-11 ASSESSMENT — PAIN SCALES - GENERAL: PAINLEVEL: NO PAIN (0)

## 2022-01-11 NOTE — PROGRESS NOTES
Assessment & Plan     (E11.65) Type 2 diabetes mellitus with hyperglycemia, without long-term current use of insulin (H)  (primary encounter diagnosis)  Comment: DM is controlled with today's Hgb A1c of 6.6, dropped down from 9.0 about 3-4 months ago.  Comorbidity include high blood pressure and history of TIA with a long history of heavy tobacco smoking.  Blood sugar has been controlled.  Encouraged him to continue working with the diabetic educator closely.  Will continue with the metformin and glipizide at the current dose.  Continue with aspirin and Lipitor as well.  Discussed about the goal for fasting blood sugar and Hgb A1C.  Emphasized on the importance of healthy diet, exercising and weight management.  UTD for retinal exam and encouraged him to work with ophthalmologist closely as per his/her recommendation.  Encourage daily foot care and yearly dental care.  Labs as ordered.  F/U in 6 months.      Labs results review and they were normal.    Plan: Hemoglobin A1c, Albumin Random Urine         Quantitative with Creat Ratio, FOOT EXAM,         Comprehensive metabolic panel (BMP + Alb, Alk         Phos, ALT, AST, Total. Bili, TP)            (I10) Hypertension, unspecified type  Comment: BP is normal and stable.  Multiple risk factors as mentioned above.  Continue with the current doses of Hyzaar, amlodipine and metoprolol, been tolerating them well.  Emphasized on healthy/low salt diet, daily excercise and weight management.  Avoid high sugar/caffeine intake. Lab as ordered.  Follow up in 6 month, earlier as needed.    Plan: Comprehensive metabolic panel (BMP + Alb, Alk         Phos, ALT, AST, Total. Bili, TP)            (Z86.73) History of TIA (transient ischemic attack) and stroke  Comment: Last cholesterol level within normal LDL level.  He is strongly encouraged to stop drinking smoking.  Continue with the Lipitor and aspirin.  Blood pressure has been well controlled.  Diabetes is also controlled.   "Symptoms that need to be seen to call in discussed.      (F17.200) Tobacco use disorder  Comment: He was strongly encouraged to stop smoking.  Multiple risks identified as above.  He is working on it.  Used to smoke 1.5 pack/day, currently smokes 1/2 pack/day.  Discussed about smoke cessation aid but he declined.  Discussed with him about the potential risks and complication associated with tobacco smoking.  Encouraged him to let me know if you need any help with its cessation.    He is not on Medicare therefore his health insurance will not cover for lung cancer screening until he is 54.      (Z12.11) Colon cancer screening  Comment: Been having bloody stool on and off in the last 8 months as well.  He was referred for colonoscopy.  Emphasized the importance to get it done as soon as possible.    Plan: Adult Gastro Ref - Procedure Only            (K92.1) Bloody stool  Comment: Please see colon cancer screening above for further details.  *    (Z28.21) Vaccination refused by patient  Comment: He was strongly recommended to get COVID, shingle, and influenza vaccinations but he declined.  Risks and benefits discussed and he was willing to take the risks.    (E87.6) hypokalemia  Likely due to the diuresis.  Started potassium chloride today, recheck the BMP in a month.       BMI:   Estimated body mass index is 28.11 kg/m  as calculated from the following:    Height as of 9/28/21: 1.854 m (6' 0.99\").    Weight as of this encounter: 96.6 kg (213 lb).   Weight management plan: Discussed healthy diet and exercise guidelines        Return in about 6 months (around 7/11/2022) for Physical Exam, dm follow up, folow up HTN/TIA.    Cary Martinez Mai, MD  [facility] RICHARD Franco is a 51 year old who presents for the following health issues     HPI     Diabetes Follow-up    How often are you checking your blood sugar? Two times daily  Blood sugar testing frequency justification:  Patient modifying " lifestyle changes (diet, exercise) with blood sugars  What time of day are you checking your blood sugars (select all that apply)?  morning and after dinner  Have you had any blood sugars above 200?  No  Have you had any blood sugars below 70?  No    What symptoms do you notice when your blood sugar is low?  None    What concerns do you have today about your diabetes? None     Do you have any of these symptoms? (Select all that apply)  No numbness or tingling in feet.  No redness, sores or blisters on feet.  No complaints of excessive thirst.  No reports of blurry vision.  No significant changes to weight.    Have you had a diabetic eye exam in the last 12 months? Yes- Date of last eye exam: couple months ago,  [facility]                Hyperlipidemia Follow-Up      Are you regularly taking any medication or supplement to lower your cholesterol?   Yes- atorvastatin    Are you having muscle aches or other side effects that you think could be caused by your cholesterol lowering medication?  No    Hypertension Follow-up      Do you check your blood pressure regularly outside of the clinic? Yes     Are you following a low salt diet? No    Are your blood pressures ever more than 140 on the top number (systolic) OR more   than 90 on the bottom number (diastolic), for example 140/90? Yes, sometimes    BP Readings from Last 2 Encounters:   01/04/22 124/80   09/28/21 (!) 132/94     Hemoglobin A1C (%)   Date Value   09/28/2021 9.0 (H)     LDL Cholesterol Calculated (mg/dL)   Date Value   07/09/2021 81       Notes above reviewed and confirmed with patient.  Salvador is here today for DM, HTN and hyperlipidemia follow up.  Overall he is doing well, been taking the medications as prescribed with no side effects.  He takes Metformin and Glipizide for DM, amlodipine, Hyzaar and metoprolol for HTN and Lipitor for high cholesterol.  Tolerating the medications well.  Not checking BP but his blood sugar has been running from  the 80s-130s fasting, no hypoglycemic or hypotensive symptoms.  Also been seeing diabetic educator and that has been very helpful.  Been working on eating healthy diet and try to stay active but not exercise regularly.  No headache, dizziness, or acute visual change.  Stated that his last retinal/eye exam was about couple months ago and he is being seen by the retinal specialist.  No chest pain or sob.  No numbness or tingling sensation on feet or fingers.  No leg swelling, dyspnea or orthopnea.  No other concern.    Stated that he has been having bloody stool that comes and goes, about 2-3 times a month in the last 8 months.  Bright red blood that stain the whole toilet bowl.  No associated diarrhea or constipation.  No associated rectal pain and denied of painful defecation.  Never had a colon cancer screening before.  No unintended weight loss.  No family history of colon cancer.  No history of hemorrhoid and denied of rectal trauma.  He smokes half a pack per day currently but used to smoke up to 1.5 pack/day for more than 35 years.  Denied of excessive alcohol intake.    Review of Systems   Constitutional, HEENT, cardiovascular, pulmonary, gi and gu systems are negative, except as otherwise noted.      Objective    /78 (Cuff Size: Adult Large)   Pulse 85   Temp 97.6  F (36.4  C) (Temporal)   Resp 16   Wt 96.6 kg (213 lb)   SpO2 97%   BMI 28.11 kg/m    Body mass index is 28.11 kg/m .  Physical Exam   GENERAL: healthy, alert and no distress, speaking full sentences  EYES: Eyes grossly normal to inspection, PERRL and conjunctivae and sclerae normal.  No nystagmus.  All 4 visual fields intact.  HENT: ear canals and TM's normal.  Nares are non-congested. Oropharynx is pink and moist. No tender with palpation to the sinuses.  NECK: Supple, no lymphadenopathy or thyromegaly.  RESP: lungs clear to auscultation - no rales, rhonchi or wheezes  CV: regular rate and rhythm, no murmur, no peripheral edema with  strong pedal pulses bilaterally  ABDOMEN: soft, nontender, nondistended, no palpable masses or organomegaly with normal bowel sounds.  No CVA tenderness  MS: no gross musculoskeletal defects noted.  No focal weakness.  NEURO: Normal strength and tone, mentation intact and speech normal.  No focal neurological deficit  Diabetic foot exam: Monofilament was normal.  No calluses.  Skin dry and clean, no open wound.  Toenails look good.      Results for orders placed or performed in visit on 01/11/22   Hemoglobin A1c     Status: Abnormal   Result Value Ref Range    Hemoglobin A1C 6.6 (H) 0.0 - 5.6 %   Albumin Random Urine Quantitative with Creat Ratio     Status: None   Result Value Ref Range    Creatinine Urine mg/dL 93 mg/dL    Albumin Urine mg/L 10 mg/L    Albumin Urine mg/g Cr 10.75 0.00 - 17.00 mg/g Cr   Comprehensive metabolic panel (BMP + Alb, Alk Phos, ALT, AST, Total. Bili, TP)     Status: Abnormal   Result Value Ref Range    Sodium 140 133 - 144 mmol/L    Potassium 2.9 (L) 3.4 - 5.3 mmol/L    Chloride 101 94 - 109 mmol/L    Carbon Dioxide (CO2) 32 20 - 32 mmol/L    Anion Gap 7 3 - 14 mmol/L    Urea Nitrogen 18 7 - 30 mg/dL    Creatinine 0.79 0.66 - 1.25 mg/dL    Calcium 9.9 8.5 - 10.1 mg/dL    Glucose 110 (H) 70 - 99 mg/dL    Alkaline Phosphatase 66 40 - 150 U/L    AST 23 0 - 45 U/L    ALT 43 0 - 70 U/L    Protein Total 7.8 6.8 - 8.8 g/dL    Albumin 3.6 3.4 - 5.0 g/dL    Bilirubin Total 0.4 0.2 - 1.3 mg/dL    GFR Estimate >90 >60 mL/min/1.73m2

## 2022-01-13 PROBLEM — Z28.21 VACCINATION REFUSED BY PATIENT: Status: ACTIVE | Noted: 2022-01-13

## 2022-01-13 RX ORDER — POTASSIUM CHLORIDE 1500 MG/1
20 TABLET, EXTENDED RELEASE ORAL 2 TIMES DAILY
Qty: 60 TABLET | Refills: 1 | Status: SHIPPED | OUTPATIENT
Start: 2022-01-13 | End: 2022-02-25

## 2022-01-14 ENCOUNTER — TELEPHONE (OUTPATIENT)
Dept: GASTROENTEROLOGY | Facility: CLINIC | Age: 52
End: 2022-01-14
Payer: COMMERCIAL

## 2022-01-14 ENCOUNTER — HOSPITAL ENCOUNTER (OUTPATIENT)
Facility: CLINIC | Age: 52
End: 2022-01-14
Attending: INTERNAL MEDICINE | Admitting: INTERNAL MEDICINE
Payer: COMMERCIAL

## 2022-01-14 DIAGNOSIS — Z12.11 COLON CANCER SCREENING: Primary | ICD-10-CM

## 2022-01-14 NOTE — TELEPHONE ENCOUNTER
Screening Questions  Blue=prep questions Red=location Green=sedation   1. Are you active on mychart? YES    2. What insurance is in the chart? BLUE PLUS     2.  Ordering/Referring Provider:     3. BMI 26.2, If greater than 40 review exclusion criteria also will need EXTENDED PREP    4.  Respiratory Screening (If yes to any of the following HOSPITAL setting only):     Do you use daily home oxygen? NO  Do you have mod to severe Obstructive Sleep Apnea? YES, BUT PT WAS NEVER DIAGNOSED   Do you have Pulmonary Hypertension? NO   Do you have UNCONTROLLED asthma? NO    5. Have you had a heart or lung transplant? NO  (If yes, please review exclusion criteria)    6. Are you currently on dialysis?NO  (If yes, schedule in HOSPITAL setting only)(If yes, please send Golytely prep)    7. Do you have chronic kidney disease? NO (If yes, please send Golytely prep)    7. Have you had a stroke or Transient ischemic attack (TIA) within 6 months? NO (If yes, do not schedule at [facility])    8. In the past 6 months, have you had any heart related issues including cardiomyopathy or heart attack? NO (If yes, please review exclusion criteria)           If yes, did it require cardiac stenting or other implantable device?NA  (If yes, please review exclusion criteria)      9. Do you have any implantable devices in your body (pacemaker, defib, LVAD)? NO (If yes, schedule at [facility])    10. Do you take nitroglycerin? If yes, how often? NO (if yes, schedule at HOSPITAL setting)    11. Are you currently taking any blood thinners?NO (If yes- inform patient to follow up with PCP or provider for follow up instructions)     12. Are you a diabetic? YES (If yes, please send Golytely prep)    13. (Females) Are you currently pregnant? NA  If yes, how many weeks?      15. Are you taking any prescription pain medications on a routine schedule? NO If yes, MAC sedation and patient will need EXTENDED PREP.    16. Do you have any chemical dependencies such as alcohol,  street drugs, or methadone? NO If yes, MAC sedation     17. Do you have any history of post-traumatic stress syndrome, severe anxiety or history of psychosis? NO  If yes, MAC sedation.     18. Do you transfer independently? YES    19.  Do you have any issues with constipation? NO   If yes, pt will need EXTENDED PREP     20. Preferred Pharmacy for Pre Prescription NA    Scheduling Details    Which Colonoscopy Prep was Sent?: GOLYTELY  Type of Procedure Scheduled: COLONOSCOPY  Surgeon: ANA  Date of Procedure: 02/21/2022  Location: [facility]  Caller (Please ask for phone number if not scheduled by patient): ARABELLA      Sedation Type: CS  Conscious Sedation- Needs  for 6 hours after the procedure  MAC/General-Needs  for 24 hours after procedure    Pre-op Required at [facility], [place], [place] and OR for MAC sedation: NO  (if yes advise patient they will need a pre-op prior to procedure)      Informed patient they will need an adult  YES  Cannot take any type of public or medical transportation alone    Pre-Procedure Covid test to be completed at [facility] or Externally: YES    Confirmed Nurse will call to complete assessment YES    Additional comments: NA ([facility]'S PATIENTS NEED EXTENDED PREP)

## 2022-01-18 ENCOUNTER — TELEPHONE (OUTPATIENT)
Dept: FAMILY MEDICINE | Facility: CLINIC | Age: 52
End: 2022-01-18
Payer: COMMERCIAL

## 2022-01-18 NOTE — TELEPHONE ENCOUNTER
Please abstract the following data from this visit with this patient into the appropriate field in Epic:    Tests that can be patient reported without a hard copy:    Eye exam with ophthalmology on this date: 8/13/21    Other Tests found in the patient's chart through Chart Review/Care Everywhere:    Eye exam with ophthalmology on this date: 8/13/21    Note to [employer]: If this section is blank, no results were found via Chart Review/Care Everywhere.

## 2022-01-25 ENCOUNTER — TELEPHONE (OUTPATIENT)
Dept: EDUCATION SERVICES | Facility: OTHER | Age: 52
End: 2022-01-25

## 2022-01-25 ENCOUNTER — ALLIED HEALTH/NURSE VISIT (OUTPATIENT)
Dept: EDUCATION SERVICES | Facility: OTHER | Age: 52
End: 2022-01-25
Payer: COMMERCIAL

## 2022-01-25 DIAGNOSIS — E11.65 TYPE 2 DIABETES MELLITUS WITH HYPERGLYCEMIA, WITHOUT LONG-TERM CURRENT USE OF INSULIN (H): Primary | ICD-10-CM

## 2022-01-25 DIAGNOSIS — E11.65 TYPE 2 DIABETES MELLITUS WITH HYPERGLYCEMIA, WITHOUT LONG-TERM CURRENT USE OF INSULIN (H): ICD-10-CM

## 2022-01-25 PROCEDURE — G0108 DIAB MANAGE TRN  PER INDIV: HCPCS | Performed by: DIETITIAN, REGISTERED

## 2022-01-25 RX ORDER — METFORMIN HCL 500 MG
TABLET, EXTENDED RELEASE 24 HR ORAL
Qty: 120 TABLET | Refills: 1 | Status: SHIPPED | OUTPATIENT
Start: 2022-01-25 | End: 2022-04-04

## 2022-01-25 NOTE — TELEPHONE ENCOUNTER
Salvador Matson Mai never started the glipizide.  He has only been on the metformin xr.  I updated this in his medications just today.    Thanks    Kerri Solano RD [facility]  [phone]

## 2022-01-25 NOTE — LETTER
"    1/25/2022         RE: Salvador Crespo  [address]  [place] [address]        Dear Colleague,    Thank you for referring your patient, Salvador Crespo, to the [facility]. Please see a copy of my visit note below.    Diabetes Self-Management Education & Support    Presents for: Follow-up    SUBJECTIVE/OBJECTIVE:  Presents for: Follow-up  Accompanied by: Self  Diabetes education in the past 24mo: Yes  Focus of Visit: Taking Medication  Diabetes type: Type 2  Date of diagnosis: 2021  Disease course: Improving  How confident are you filling out medical forms by yourself:: Not Assessed  Diabetes management related comments/concerns: wondering if he can be off medication and his blood sugars will be okay  Transportation concerns: No  Difficulty affording diabetes medication?: No  Difficulty affording diabetes testing supplies?: No  Other concerns:: None  Cultural Influences/Ethnic Background:  Not  or       Diabetes Symptoms & Complications:  Fatigue: Sometimes  Neuropathy: No  Polydipsia: No  Polyphagia: No  Polyuria: No  Visual change: No  Slow healing wounds: No  Symptom course: Resolved  Weight trend: Decreasing (ays thinks has lost about 2#)  Complications assessed today?: No  Autonomic neuropathy: No  CVA: Other  Heart disease: No  Nephropathy: No  Peripheral neuropathy: No  Peripheral Vascular Disease: No  Retinopathy: Yes  Sexual dysfunction: Other    Patient Problem List and Family Medical History reviewed for relevant medical history, current medical status, and diabetes risk factors.    Vitals:  There were no vitals taken for this visit.  Estimated body mass index is 28.11 kg/m  as calculated from the following:    Height as of 9/28/21: 1.854 m (6' 0.99\").    Weight as of 1/11/22: 96.6 kg (213 lb).   Last 3 BP:   BP Readings from Last 3 Encounters:   01/11/22 120/78   01/04/22 124/80   09/28/21 (!) 132/94       History   Smoking Status     Current Every Day Smoker "     Packs/day: 0.50     Types: Cigarettes   Smokeless Tobacco     Never Used       Labs:  Lab Results   Component Value Date    A1C 6.6 01/11/2022     Lab Results   Component Value Date     01/11/2022     07/09/2021     Lab Results   Component Value Date    LDL 81 07/09/2021     HDL Cholesterol   Date Value Ref Range Status   07/09/2021 35 (L) >39 mg/dL Final   ]  GFR Estimate   Date Value Ref Range Status   01/11/2022 >90 >60 mL/min/1.73m2 Final     Comment:     Effective December 21, 2021 eGFRcr in adults is calculated using the 2021 CKD-EPI creatinine equation which includes age and gender (Jose et al., NEJ, DOI: 10.1056/MHOCdv9532764)   07/09/2021 >90 >60 mL/min/[1.73_m2] Final     Comment:     Non  GFR Calc  Starting 12/18/2018, serum creatinine based estimated GFR (eGFR) will be   calculated using the Chronic Kidney Disease Epidemiology Collaboration   (CKD-EPI) equation.       GFR Estimate If Black   Date Value Ref Range Status   07/09/2021 >90 >60 mL/min/[1.73_m2] Final     Comment:      GFR Calc  Starting 12/18/2018, serum creatinine based estimated GFR (eGFR) will be   calculated using the Chronic Kidney Disease Epidemiology Collaboration   (CKD-EPI) equation.       Lab Results   Component Value Date    CR 0.79 01/11/2022    CR 0.95 07/09/2021     No results found for: MICROALBUMIN    Healthy Eating:  Healthy Eating Assessed Today: Yes  Cultural/Temple diet restrictions?: No  Meal planning/habits: Smaller portions  How many times a week on average do you eat food made away from home (restaurant/take-out)?: 1  Meals include: Breakfast,Dinner,Evening Snack  Breakfast: 0900 Breakfast bowl  (1)  Lunch: 8695-5453 sliced meat from [employer] and has sandwich  Dinner: 9852-2861 steak and fish and chicken.  Steamed vegetables, not typically a starch.  May have some  potato or macaroni salad  Snacks: doesn't go overboard.  Maybe a couple cookies or twinkies.  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  Goes for a walk after dinner around [place])  Days per week of moderate to strenuous exercise (like a brisk walk): 7  On average, minutes per day of exercise at this level: 30  How intense was your typical exercise? : Moderate (like brisk walking)  Exercise Minutes per Week: 210  Barrier to exercise: None    Monitoring:  Monitoring Assessed Today: Yes  Did patient bring glucose meter to appointment? : Yes  Blood Glucose Meter: Accu-chek  Times checking blood sugar at home (number): 2  Times checking blood sugar at home (per): Day  Blood glucose trend: Decreasing    Date Breakfast  Lunch  Dinner     Before After Before After Before After   1/25 137        1/24 129     139   1/23 119     17   1/24 134     103   1/25 122     107   1/24 129     107   1/23 116     121         Taking Medications:  Diabetes Medication(s)     Biguanides       metFORMIN (GLUCOPHAGE-XR) 500 MG 24 hr tablet    TAKE 2 TABLETS BY MOUTH ONCE DAILY WITH BREAKFAST AND 2 WITH SUPPER          Taking Medication Assessed Today: Yes  Current Treatments: Oral Medication (taken by mouth)  Problems taking diabetes medications regularly?: No  Diabetes medication side effects?: No (doing much better with extended release)    Problem Solving:  Problem Solving Assessed Today: Yes  Is the patient at risk for hypoglycemia?: No              Reducing Risks:  Reducing Risks Assessed Today: Yes  Diabetes Risks: Age over 45 years,Family History  CAD Risks: Diabetes Mellitus,Hypertension,Tobacco exposure,Male sex  Has dilated eye exam at least once a year?: Yes  Sees dentist every 6 months?: No  Feet checked by healthcare provider in the last year?:  Yes    Healthy Coping:  Healthy Coping Assessed Today: Yes  Emotional response to diabetes: Ready to learn,Acceptance  Informal Support system:: Family,Friends,Significant other  Stage of change: ACTION (Actively working towards change)  Patient Activation Measure Survey Score:  No flowsheet data found.    Diabetes knowledge and skills assessment:   Patient is knowledgeable in diabetes management concepts related to: Healthy Eating    Patient needs further education on the following diabetes management concepts: Healthy Eating, Monitoring and Problem Solving    Based on learning assessment above, most appropriate setting for further diabetes education would be: Individual setting.      INTERVENTIONS:    Education provided today on:  AADE Self-Care Behaviors:  Diabetes Pathophysiology  Healthy Eating: carbohydrate counting, consistency in amount, composition, and timing of food intake, heart healthy diet and label reading  Being Active: relationship to blood glucose and describe appropriate activity program  Monitoring: individual blood glucose targets and frequency of monitoring  Taking Medication: action of prescribed medication and when to take medications  Reducing Risks: major complications of diabetes, prevention, early diagnostic measures and treatment of complications, foot care, appropriate dental care, annual eye exam, smoking cessation, A1C - goals, relating to blood glucose levels, how often to check, lipids levels and goals and blood pressure and goals    Opportunities for ongoing education and support in diabetes-self management were discussed.    Pt verbalized understanding of concepts discussed and recommendations provided today.       Education Materials Provided:  Carbohydrate Counting      ASSESSMENT:  Pt is doing well.  A1C has decreased to less than 7.  Pt has reduced his portions.  Tolerating Metformin XR.  Pt wondering if he needs to be on Metformin XR forever.  Explained what metformin XR is  for and relationship to DM.  Pt has made significant changes in his diet, including greatly reduced soda intake.  Explained would encourage him to stay on it, however, if he wants a trial off of it, to monitor blood sugars closely and talk to his PCP about it first.  Pt verbalizes understanding.  New dx Diabetes Education is complete at this time.        Patient's most recent   Lab Results   Component Value Date    A1C 6.6 01/11/2022    is meeting goal of <7.0    PLAN  See Patient Instructions for co-developed, patient-stated behavior change goals.  AVS printed and provided to patient today. See Follow-Up section for recommended follow-up.      Time Spent: 60 minutes  Encounter Type: Individual    Any diabetes medication dose changes were made via the CDE Protocol and Collaborative Practice Agreement with the patient's referring provider.     Kerri Solano RD [facility]  [phone]

## 2022-01-25 NOTE — PROGRESS NOTES
"Diabetes Self-Management Education & Support    Presents for: Follow-up    SUBJECTIVE/OBJECTIVE:  Presents for: Follow-up  Accompanied by: Self  Diabetes education in the past 24mo: Yes  Focus of Visit: Taking Medication  Diabetes type: Type 2  Date of diagnosis: 2021  Disease course: Improving  How confident are you filling out medical forms by yourself:: Not Assessed  Diabetes management related comments/concerns: wondering if he can be off medication and his blood sugars will be okay  Transportation concerns: No  Difficulty affording diabetes medication?: No  Difficulty affording diabetes testing supplies?: No  Other concerns:: None  Cultural Influences/Ethnic Background:  Not  or       Diabetes Symptoms & Complications:  Fatigue: Sometimes  Neuropathy: No  Polydipsia: No  Polyphagia: No  Polyuria: No  Visual change: No  Slow healing wounds: No  Symptom course: Resolved  Weight trend: Decreasing (ays thinks has lost about 2#)  Complications assessed today?: No  Autonomic neuropathy: No  CVA: Other  Heart disease: No  Nephropathy: No  Peripheral neuropathy: No  Peripheral Vascular Disease: No  Retinopathy: Yes  Sexual dysfunction: Other    Patient Problem List and Family Medical History reviewed for relevant medical history, current medical status, and diabetes risk factors.    Vitals:  There were no vitals taken for this visit.  Estimated body mass index is 28.11 kg/m  as calculated from the following:    Height as of 9/28/21: 1.854 m (6' 0.99\").    Weight as of 1/11/22: 96.6 kg (213 lb).   Last 3 BP:   BP Readings from Last 3 Encounters:   01/11/22 120/78   01/04/22 124/80   09/28/21 (!) 132/94       History   Smoking Status     Current Every Day Smoker     Packs/day: 0.50     Types: Cigarettes   Smokeless Tobacco     Never Used       Labs:  Lab Results   Component Value Date    A1C 6.6 01/11/2022     Lab Results   Component Value Date     01/11/2022     07/09/2021     Lab Results "   Component Value Date    LDL 81 07/09/2021     HDL Cholesterol   Date Value Ref Range Status   07/09/2021 35 (L) >39 mg/dL Final   ]  GFR Estimate   Date Value Ref Range Status   01/11/2022 >90 >60 mL/min/1.73m2 Final     Comment:     Effective December 21, 2021 eGFRcr in adults is calculated using the 2021 CKD-EPI creatinine equation which includes age and gender (Jose et al., NE, DOI: 10.1056/JQHVut5777669)   07/09/2021 >90 >60 mL/min/[1.73_m2] Final     Comment:     Non  GFR Calc  Starting 12/18/2018, serum creatinine based estimated GFR (eGFR) will be   calculated using the Chronic Kidney Disease Epidemiology Collaboration   (CKD-EPI) equation.       GFR Estimate If Black   Date Value Ref Range Status   07/09/2021 >90 >60 mL/min/[1.73_m2] Final     Comment:      GFR Calc  Starting 12/18/2018, serum creatinine based estimated GFR (eGFR) will be   calculated using the Chronic Kidney Disease Epidemiology Collaboration   (CKD-EPI) equation.       Lab Results   Component Value Date    CR 0.79 01/11/2022    CR 0.95 07/09/2021     No results found for: MICROALBUMIN    Healthy Eating:  Healthy Eating Assessed Today: Yes  Cultural/Scientology diet restrictions?: No  Meal planning/habits: Smaller portions  How many times a week on average do you eat food made away from home (restaurant/take-out)?: 1  Meals include: Breakfast,Dinner,Evening Snack  Breakfast: 0900 Breakfast bowl  (1)  Lunch: [id number] sliced meat from [employer] and has sandwich  Dinner: 5088-8142 steak and fish and chicken.  Steamed vegetables, not typically a starch.  May have some  potato or macaroni salad  Snacks: doesn't go overboard.  Maybe a couple cookies or twinkies.  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  Goes for a walk after [place] around [place])  Days per week of moderate to strenuous exercise (like a brisk walk): 7  On average, minutes per day of exercise at this level: 30  How intense was your typical exercise? : Moderate (like brisk walking)  Exercise Minutes per Week: 210  Barrier to exercise: None    Monitoring:  Monitoring Assessed Today: Yes  Did patient bring glucose meter to appointment? : Yes  Blood Glucose Meter: Accu-chek  Times checking blood sugar at home (number): 2  Times checking blood sugar at home (per): Day  Blood glucose trend: Decreasing    Date Breakfast  Lunch  Dinner     Before After Before After Before After   1/25 137        1/24 129     139   1/23 119     17   1/24 134     103   1/25 122     107   1/24 129     107   1/23 116     121         Taking Medications:  Diabetes Medication(s)     Biguanides       metFORMIN (GLUCOPHAGE-XR) 500 MG 24 hr tablet    TAKE 2 TABLETS BY MOUTH ONCE DAILY WITH BREAKFAST AND 2 WITH SUPPER          Taking Medication Assessed Today: Yes  Current Treatments: Oral Medication (taken by mouth)  Problems taking diabetes medications regularly?: No  Diabetes medication side effects?: No (doing much better with extended release)    Problem Solving:  Problem Solving Assessed Today: Yes  Is the patient at risk for hypoglycemia?: No              Reducing Risks:  Reducing Risks Assessed Today: Yes  Diabetes Risks: Age over 45 years,Family History  CAD Risks: Diabetes Mellitus,Hypertension,Tobacco exposure,Male sex  Has dilated eye exam at least once a year?: Yes  Sees dentist every 6 months?: No  Feet checked by healthcare provider in the last year?: Yes    Healthy Coping:  Healthy Coping Assessed Today: Yes  Emotional response to diabetes: Ready to learn,Acceptance  Informal Support system:: Family,Friends,Significant other  Stage of change: ACTION (Actively working towards  change)  Patient Activation Measure Survey Score:  No flowsheet data found.    Diabetes knowledge and skills assessment:   Patient is knowledgeable in diabetes management concepts related to: Healthy Eating    Patient needs further education on the following diabetes management concepts: Healthy Eating, Monitoring and Problem Solving    Based on learning assessment above, most appropriate setting for further diabetes education would be: Individual setting.      INTERVENTIONS:    Education provided today on:  AADE Self-Care Behaviors:  Diabetes Pathophysiology  Healthy Eating: carbohydrate counting, consistency in amount, composition, and timing of food intake, heart healthy diet and label reading  Being Active: relationship to blood glucose and describe appropriate activity program  Monitoring: individual blood glucose targets and frequency of monitoring  Taking Medication: action of prescribed medication and when to take medications  Reducing Risks: major complications of diabetes, prevention, early diagnostic measures and treatment of complications, foot care, appropriate dental care, annual eye exam, smoking cessation, A1C - goals, relating to blood glucose levels, how often to check, lipids levels and goals and blood pressure and goals    Opportunities for ongoing education and support in diabetes-self management were discussed.    Pt verbalized understanding of concepts discussed and recommendations provided today.       Education Materials Provided:  Carbohydrate Counting      ASSESSMENT:  Pt is doing well.  A1C has decreased to less than 7.  Pt has reduced his portions.  Tolerating Metformin XR.  Pt wondering if he needs to be on Metformin XR forever.  Explained what metformin XR is for and relationship to DM.  Pt has made significant changes in his diet, including greatly reduced soda intake.  Explained would encourage him to stay on it, however, if he wants a trial off of it, to monitor blood sugars closely  and talk to his PCP about it first.  Pt verbalizes understanding.  New dx Diabetes Education is complete at this time.        Patient's most recent   Lab Results   Component Value Date    A1C 6.6 01/11/2022    is meeting goal of <7.0    PLAN  See Patient Instructions for co-developed, patient-stated behavior change goals.  AVS printed and provided to patient today. See Follow-Up section for recommended follow-up.      Time Spent: 60 minutes  Encounter Type: Individual    Any diabetes medication dose changes were made via the CDE Protocol and Collaborative Practice Agreement with the patient's referring provider.     Kerri Solano RD [facility]  [phone]

## 2022-01-25 NOTE — TELEPHONE ENCOUNTER
Dr Schaeffer,    Pt mentions that you want a repeat BMP in about 1 month.  I don't see that in the lab orders.  Can you place an order if you would like pt to do this?    Also, pt needs a refill for metformin XR.  This order was pended for your review and signature.    Thanks!    CINDY Vang [facility]  [phone]

## 2022-02-04 DIAGNOSIS — Z11.59 ENCOUNTER FOR SCREENING FOR OTHER VIRAL DISEASES: Primary | ICD-10-CM

## 2022-02-08 DIAGNOSIS — I10 HYPERTENSION, UNSPECIFIED TYPE: ICD-10-CM

## 2022-02-08 RX ORDER — ATORVASTATIN CALCIUM 20 MG/1
TABLET, FILM COATED ORAL
Qty: 30 TABLET | Refills: 5 | Status: SHIPPED | OUTPATIENT
Start: 2022-02-08 | End: 2022-10-05

## 2022-02-15 ENCOUNTER — TELEPHONE (OUTPATIENT)
Dept: GASTROENTEROLOGY | Facility: CLINIC | Age: 52
End: 2022-02-15
Payer: COMMERCIAL

## 2022-02-15 NOTE — TELEPHONE ENCOUNTER
Caller: Alee    Procedure: colonoscopy    Date, Location, and Surgeon of Procedure Cancelled: 02/21/2022 at  with Dr. Erazo    Ordering Provider:Cary Schaeffer MD in  [facility]    Reason for cancel (please be detailed, any staff messages or encounters to note?): scheduling conflict        Rescheduled: yes     If rescheduled:    Date: 03/09/2022   Location:    Note any change or update to original order/sedation: NO

## 2022-02-20 DIAGNOSIS — I10 HYPERTENSION, UNSPECIFIED TYPE: ICD-10-CM

## 2022-02-21 DIAGNOSIS — F17.200 TOBACCO USE DISORDER: ICD-10-CM

## 2022-02-21 RX ORDER — TIOTROPIUM BROMIDE INHALATION SPRAY 3.12 UG/1
SPRAY, METERED RESPIRATORY (INHALATION)
Qty: 16 G | Refills: 2 | Status: SHIPPED | OUTPATIENT
Start: 2022-02-21 | End: 2023-04-05

## 2022-02-21 RX ORDER — LOSARTAN POTASSIUM AND HYDROCHLOROTHIAZIDE 25; 100 MG/1; MG/1
TABLET ORAL
Qty: 90 TABLET | Refills: 0 | Status: SHIPPED | OUTPATIENT
Start: 2022-02-21 | End: 2022-05-19

## 2022-02-21 NOTE — TELEPHONE ENCOUNTER
Routing refill request to provider for review/approval because:  Labs out of range:  potassium     Lindsay Bah Rn

## 2022-02-21 NOTE — TELEPHONE ENCOUNTER
Please be sure to get the BMP check when he is coming in for lab or colonoscopy.  His potassium level was low.

## 2022-02-24 ENCOUNTER — LAB (OUTPATIENT)
Dept: LAB | Facility: CLINIC | Age: 52
End: 2022-02-24
Payer: COMMERCIAL

## 2022-02-24 DIAGNOSIS — E11.65 TYPE 2 DIABETES MELLITUS WITH HYPERGLYCEMIA, WITHOUT LONG-TERM CURRENT USE OF INSULIN (H): ICD-10-CM

## 2022-02-24 LAB
ANION GAP SERPL CALCULATED.3IONS-SCNC: 7 MMOL/L (ref 3–14)
BUN SERPL-MCNC: 21 MG/DL (ref 7–30)
CALCIUM SERPL-MCNC: 9.3 MG/DL (ref 8.5–10.1)
CHLORIDE BLD-SCNC: 99 MMOL/L (ref 94–109)
CO2 SERPL-SCNC: 33 MMOL/L (ref 20–32)
CREAT SERPL-MCNC: 0.91 MG/DL (ref 0.66–1.25)
GFR SERPL CREATININE-BSD FRML MDRD: >90 ML/MIN/1.73M2
GLUCOSE BLD-MCNC: 142 MG/DL (ref 70–99)
POTASSIUM BLD-SCNC: 3 MMOL/L (ref 3.4–5.3)
SODIUM SERPL-SCNC: 139 MMOL/L (ref 133–144)

## 2022-02-24 PROCEDURE — 36415 COLL VENOUS BLD VENIPUNCTURE: CPT

## 2022-02-24 PROCEDURE — 80048 BASIC METABOLIC PNL TOTAL CA: CPT

## 2022-02-25 DIAGNOSIS — E87.6 HYPOKALEMIA: ICD-10-CM

## 2022-02-25 DIAGNOSIS — I10 HYPERTENSION, UNSPECIFIED TYPE: ICD-10-CM

## 2022-02-25 RX ORDER — POTASSIUM CHLORIDE 1500 MG/1
TABLET, EXTENDED RELEASE ORAL
Qty: 90 TABLET | Refills: 4 | Status: SHIPPED | OUTPATIENT
Start: 2022-02-25 | End: 2024-04-17

## 2022-03-01 ENCOUNTER — NURSE TRIAGE (OUTPATIENT)
Dept: NURSING | Facility: CLINIC | Age: 52
End: 2022-03-01
Payer: COMMERCIAL

## 2022-03-01 ENCOUNTER — HOSPITAL ENCOUNTER (EMERGENCY)
Facility: CLINIC | Age: 52
Discharge: HOME | End: 2022-03-01
Attending: PHYSICIAN ASSISTANT | Admitting: PHYSICIAN ASSISTANT
Payer: COMMERCIAL

## 2022-03-01 ENCOUNTER — APPOINTMENT (OUTPATIENT)
Dept: CT IMAGING | Facility: CLINIC | Age: 52
End: 2022-03-01
Attending: PHYSICIAN ASSISTANT
Payer: COMMERCIAL

## 2022-03-01 VITALS
SYSTOLIC BLOOD PRESSURE: 143 MMHG | RESPIRATION RATE: 16 BRPM | DIASTOLIC BLOOD PRESSURE: 96 MMHG | HEART RATE: 83 BPM | BODY MASS INDEX: 27.98 KG/M2 | OXYGEN SATURATION: 96 % | WEIGHT: 212 LBS | TEMPERATURE: 97.6 F

## 2022-03-01 DIAGNOSIS — N13.2 HYDRONEPHROSIS WITH URINARY OBSTRUCTION DUE TO URETERAL CALCULUS: ICD-10-CM

## 2022-03-01 LAB
ALBUMIN UR-MCNC: NEGATIVE MG/DL
ANION GAP SERPL CALCULATED.3IONS-SCNC: 7 MMOL/L (ref 3–14)
APPEARANCE UR: CLEAR
BASOPHILS # BLD AUTO: 0.1 10E3/UL (ref 0–0.2)
BASOPHILS NFR BLD AUTO: 1 %
BILIRUB UR QL STRIP: NEGATIVE
BUN SERPL-MCNC: 26 MG/DL (ref 7–30)
CALCIUM SERPL-MCNC: 9.2 MG/DL (ref 8.5–10.1)
CHLORIDE BLD-SCNC: 101 MMOL/L (ref 94–109)
CO2 SERPL-SCNC: 29 MMOL/L (ref 20–32)
COLOR UR AUTO: YELLOW
CREAT SERPL-MCNC: 1.05 MG/DL (ref 0.66–1.25)
EOSINOPHIL # BLD AUTO: 0.1 10E3/UL (ref 0–0.7)
EOSINOPHIL NFR BLD AUTO: 1 %
ERYTHROCYTE [DISTWIDTH] IN BLOOD BY AUTOMATED COUNT: 12.6 % (ref 10–15)
GFR SERPL CREATININE-BSD FRML MDRD: 85 ML/MIN/1.73M2
GLUCOSE BLD-MCNC: 138 MG/DL (ref 70–99)
GLUCOSE UR STRIP-MCNC: NEGATIVE MG/DL
HCT VFR BLD AUTO: 46.8 % (ref 40–53)
HGB BLD-MCNC: 16.9 G/DL (ref 13.3–17.7)
HGB UR QL STRIP: NEGATIVE
IMM GRANULOCYTES # BLD: 0.1 10E3/UL
IMM GRANULOCYTES NFR BLD: 0 %
KETONES UR STRIP-MCNC: ABNORMAL MG/DL
LEUKOCYTE ESTERASE UR QL STRIP: NEGATIVE
LYMPHOCYTES # BLD AUTO: 1.9 10E3/UL (ref 0.8–5.3)
LYMPHOCYTES NFR BLD AUTO: 14 %
MCH RBC QN AUTO: 30.9 PG (ref 26.5–33)
MCHC RBC AUTO-ENTMCNC: 36.1 G/DL (ref 31.5–36.5)
MCV RBC AUTO: 86 FL (ref 78–100)
MONOCYTES # BLD AUTO: 0.8 10E3/UL (ref 0–1.3)
MONOCYTES NFR BLD AUTO: 6 %
MUCOUS THREADS #/AREA URNS LPF: PRESENT /LPF
NEUTROPHILS # BLD AUTO: 10.2 10E3/UL (ref 1.6–8.3)
NEUTROPHILS NFR BLD AUTO: 78 %
NITRATE UR QL: NEGATIVE
NRBC # BLD AUTO: 0 10E3/UL
NRBC BLD AUTO-RTO: 0 /100
PH UR STRIP: 8 [PH] (ref 5–7)
PLATELET # BLD AUTO: 265 10E3/UL (ref 150–450)
POTASSIUM BLD-SCNC: 2.9 MMOL/L (ref 3.4–5.3)
RBC # BLD AUTO: 5.47 10E6/UL (ref 4.4–5.9)
RBC URINE: 1 /HPF
SODIUM SERPL-SCNC: 137 MMOL/L (ref 133–144)
SP GR UR STRIP: 1.02 (ref 1–1.03)
UROBILINOGEN UR STRIP-MCNC: NORMAL MG/DL
WBC # BLD AUTO: 13.1 10E3/UL (ref 4–11)
WBC URINE: 1 /HPF

## 2022-03-01 PROCEDURE — 81001 URINALYSIS AUTO W/SCOPE: CPT | Performed by: PHYSICIAN ASSISTANT

## 2022-03-01 PROCEDURE — 258N000003 HC RX IP 258 OP 636: Performed by: PHYSICIAN ASSISTANT

## 2022-03-01 PROCEDURE — 36415 COLL VENOUS BLD VENIPUNCTURE: CPT | Performed by: PHYSICIAN ASSISTANT

## 2022-03-01 PROCEDURE — 99284 EMERGENCY DEPT VISIT MOD MDM: CPT | Mod: 25 | Performed by: PHYSICIAN ASSISTANT

## 2022-03-01 PROCEDURE — 96361 HYDRATE IV INFUSION ADD-ON: CPT | Performed by: PHYSICIAN ASSISTANT

## 2022-03-01 PROCEDURE — 80048 BASIC METABOLIC PNL TOTAL CA: CPT | Performed by: PHYSICIAN ASSISTANT

## 2022-03-01 PROCEDURE — 85025 COMPLETE CBC W/AUTO DIFF WBC: CPT | Performed by: PHYSICIAN ASSISTANT

## 2022-03-01 PROCEDURE — 99284 EMERGENCY DEPT VISIT MOD MDM: CPT | Performed by: PHYSICIAN ASSISTANT

## 2022-03-01 PROCEDURE — 96375 TX/PRO/DX INJ NEW DRUG ADDON: CPT | Performed by: PHYSICIAN ASSISTANT

## 2022-03-01 PROCEDURE — 250N000011 HC RX IP 250 OP 636: Performed by: PHYSICIAN ASSISTANT

## 2022-03-01 PROCEDURE — 74176 CT ABD & PELVIS W/O CONTRAST: CPT

## 2022-03-01 PROCEDURE — 96374 THER/PROPH/DIAG INJ IV PUSH: CPT | Performed by: PHYSICIAN ASSISTANT

## 2022-03-01 RX ORDER — ONDANSETRON 4 MG/1
4 TABLET, ORALLY DISINTEGRATING ORAL EVERY 8 HOURS PRN
Qty: 10 TABLET | Refills: 0 | Status: SHIPPED | OUTPATIENT
Start: 2022-03-01 | End: 2022-03-04

## 2022-03-01 RX ORDER — HYDROCODONE BITARTRATE AND ACETAMINOPHEN 5; 325 MG/1; MG/1
1 TABLET ORAL EVERY 6 HOURS PRN
Qty: 10 TABLET | Refills: 0 | Status: SHIPPED | OUTPATIENT
Start: 2022-03-01 | End: 2022-03-04

## 2022-03-01 RX ORDER — ONDANSETRON 2 MG/ML
4 INJECTION INTRAMUSCULAR; INTRAVENOUS EVERY 30 MIN PRN
Status: DISCONTINUED | OUTPATIENT
Start: 2022-03-01 | End: 2022-03-01 | Stop reason: HOSPADM

## 2022-03-01 RX ORDER — TAMSULOSIN HYDROCHLORIDE 0.4 MG/1
0.4 CAPSULE ORAL DAILY
Qty: 7 CAPSULE | Refills: 0 | Status: SHIPPED | OUTPATIENT
Start: 2022-03-01 | End: 2022-03-08

## 2022-03-01 RX ORDER — KETOROLAC TROMETHAMINE 30 MG/ML
30 INJECTION, SOLUTION INTRAMUSCULAR; INTRAVENOUS ONCE
Status: COMPLETED | OUTPATIENT
Start: 2022-03-01 | End: 2022-03-01

## 2022-03-01 RX ORDER — SODIUM CHLORIDE 9 MG/ML
INJECTION, SOLUTION INTRAVENOUS CONTINUOUS
Status: DISCONTINUED | OUTPATIENT
Start: 2022-03-01 | End: 2022-03-01 | Stop reason: HOSPADM

## 2022-03-01 RX ORDER — IBUPROFEN 800 MG/1
800 TABLET, FILM COATED ORAL EVERY 8 HOURS PRN
Qty: 30 TABLET | Refills: 1 | Status: SHIPPED | OUTPATIENT
Start: 2022-03-01 | End: 2023-05-01

## 2022-03-01 RX ADMIN — ONDANSETRON 4 MG: 2 INJECTION INTRAMUSCULAR; INTRAVENOUS at 14:00

## 2022-03-01 RX ADMIN — SODIUM CHLORIDE 1000 ML: 9 INJECTION, SOLUTION INTRAVENOUS at 14:00

## 2022-03-01 RX ADMIN — KETOROLAC TROMETHAMINE 30 MG: 30 INJECTION, SOLUTION INTRAMUSCULAR; INTRAVENOUS at 14:03

## 2022-03-01 NOTE — DISCHARGE INSTRUCTIONS
You have a 0.3 cm stone right at the junction where your bladder meets the ureter (tube from the kidney). This should pass on its own. The Flomax prescribed will help the tubes relax you can do so. Be cautious when taking Flomax with your Viagra because it can cause extra low blood pressure.    Use the Norco and ibuprofen prescribed as needed for pain control. The Zofran should help you with the nausea.    Strain your urine to see if you can collect the stone and bring it back to the clinic at a follow-up appointment to have it tested for what kind of stone you have.    If you develop any worsening symptoms please return to the emergency department.    Thank you for choosing [facility] Emergency Department. It was a pleasure taking care of you today. If you have any questions, please call [phone].    Lolita Schaeffer PA-C

## 2022-03-01 NOTE — TELEPHONE ENCOUNTER
Telephone radha;l    Patient significant other called  To report Lower back pain right side over the kidney.  He rates the pain 10/10. He is not taking anything for pain. He is vomiting and is complaining of constipation.  He gave verbal permission to talk with partner about his health care.  He has refused any medication until this morning and took some milk of magnesia and he had a small BM but now nothing is moving.  He thinks it is his kidney but denies any burning with urination or frequency.  It is reported he is walking around pacing and he has tears in his eyes.    Per protocol     Go to ED/[facility] now (or PCP with approval.  They are going to try to get him to go to  The ED.  Care advice given.  Verbalizes understanding and agrees with plan.    Nayeli Godwin RN   [facility] Nurse Advisor  11:41 AM 3/1/2022        Reason for Disposition    Vomiting and pain over lower ribs of back (i.e., flank - kidney area)    Additional Information    Negative: Major injury to the back (e.g., MVA, fall > 10 feet or 3 meters, penetrating injury, etc.)    Negative: Pain in the upper back over the ribs (rib cage) that radiates (travels) into the chest    Negative: Pain in the upper back over the ribs (rib cage) and worsened by coughing (or clearly increases with breathing)    Negative: Blood in urine (red, pink, or tea-colored)    Negative: Pain radiates into groin, scrotum    Negative: SEVERE back pain of sudden onset and age > 60    Negative: SEVERE abdominal pain (e.g., excruciating)    Negative: Abdominal pain and age > 60    Negative: Unable to urinate (or only a few drops) and bladder feels very full    Negative: Loss of bladder or bowel control (urine or bowel incontinence; wetting self, leaking stool) of new onset    Negative: Numbness (loss of sensation) in groin or rectal area    Negative: Passed out (i.e., fainted, collapsed and was not responding)    Negative: Shock suspected (e.g., cold/pale/clammy skin, too  weak to stand, low BP, rapid pulse)    Negative: Sounds like a life-threatening emergency to the triager    Protocols used: BACK PAIN-A-OH

## 2022-03-01 NOTE — ED PROVIDER NOTES
History     Chief Complaint   Patient presents with     Flank Pain       HPI  Salvador Crespo is a 51 year old male who presents to the emergency department complaining of right flank pain. The patient reports about 4 hours prior to arrival he developed sudden onset right sided pain.  Goes from his flank region down to his right lower quadrant.  It is sharp and constant.  He has not taken anything for pain since it started.  He has had associated nausea and vomiting with some difficulty with urination.  No burning with urination or blood in the urine.  No diarrhea.  No history of kidney stones.        Allergies:  No Known Allergies    Problem List:    Patient Active Problem List    Diagnosis Date Noted     Vaccination refused by patient 2022     Priority: Medium     Diabetes mellitus, type 2 (H) 10/01/2021     Priority: Medium     Erectile dysfunction, unspecified erectile dysfunction type 07/10/2021     Priority: Medium     Hypertension, unspecified type 2021     Priority: Medium     History of TIA (transient ischemic attack) and stroke 2021     Priority: Medium     Tobacco use disorder 2021     Priority: Medium        Past Medical History:    Past Medical History:   Diagnosis Date     Cerebral artery occlusion with cerebral infarction (H)      Heart disease      Hypertension        Past Surgical History:    Past Surgical History:   Procedure Laterality Date     NO HISTORY OF SURGERY         Family History:    Family History   Problem Relation Age of Onset     Unknown/Adopted Mother      Unknown/Adopted Father      Unknown/Adopted Maternal Grandmother      Unknown/Adopted Maternal Grandfather      Unknown/Adopted Paternal Grandmother      Unknown/Adopted Paternal Grandfather      Aneurysm Brother          at 16     No Known Problems Daughter      No Known Problems Son      No Known Problems Son        Social History:  Marital Status:  Single [1]  Social History     Tobacco Use     Smoking  status: Current Every Day Smoker     Packs/day: 0.50     Types: Cigarettes     Smokeless tobacco: Never Used   Vaping Use     Vaping Use: Never used   Substance Use Topics     Alcohol use: Not Currently     Drug use: Yes     Types: Marijuana     Comment: daily - 07/09/2021        Medications:    HYDROcodone-acetaminophen (NORCO) 5-325 MG tablet  ibuprofen (ADVIL/MOTRIN) 800 MG tablet  ondansetron (ZOFRAN ODT) 4 MG ODT tab  tamsulosin (FLOMAX) 0.4 MG capsule  alcohol swab prep pads  amLODIPine (NORVASC) 5 MG tablet  aspirin (ASA) 81 MG EC tablet  atorvastatin (LIPITOR) 20 MG tablet  blood glucose (NO BRAND SPECIFIED) test strip  blood glucose calibration (NO BRAND SPECIFIED) solution  losartan-hydrochlorothiazide (HYZAAR) 100-25 MG tablet  metFORMIN (GLUCOPHAGE-XR) 500 MG 24 hr tablet  metoprolol succinate ER (TOPROL-XL) 25 MG 24 hr tablet  potassium chloride ER (KLOR-CON M) 20 MEQ CR tablet  sildenafil (VIAGRA) 100 MG tablet  SPIRIVA RESPIMAT 2.5 MCG/ACT inhaler  thin (NO BRAND SPECIFIED) lancets          Review of Systems   All other systems reviewed and are negative.      Physical Exam   BP: (!) 154/127  Pulse: 87  Temp: 97.6  F (36.4  C)  Resp: 16  Weight: 96.2 kg (212 lb)  SpO2: 100 %      Physical Exam  Vitals and nursing note reviewed.   Constitutional:       General: He is in acute distress (appears uncomfortable).      Appearance: Normal appearance. He is well-developed. He is not ill-appearing, toxic-appearing or diaphoretic.   HENT:      Head: Normocephalic and atraumatic.      Nose: Nose normal.      Mouth/Throat:      Mouth: Mucous membranes are moist.      Pharynx: Oropharynx is clear.   Eyes:      Conjunctiva/sclera: Conjunctivae normal.      Pupils: Pupils are equal, round, and reactive to light.   Cardiovascular:      Rate and Rhythm: Normal rate and regular rhythm.      Heart sounds: Normal heart sounds.   Pulmonary:      Effort: Pulmonary effort is normal. No respiratory distress.      Breath  sounds: Normal breath sounds.   Abdominal:      General: Bowel sounds are normal. There is no distension.      Palpations: Abdomen is soft.      Tenderness: There is abdominal tenderness in the right lower quadrant. There is right CVA tenderness.   Musculoskeletal:         General: No deformity.      Cervical back: Neck supple.   Skin:     General: Skin is warm and dry.   Neurological:      General: No focal deficit present.      Mental Status: He is alert and oriented to person, place, and time. Mental status is at baseline.      Coordination: Coordination normal.   Psychiatric:         Mood and Affect: Mood is anxious.         Behavior: Behavior normal.         ED Course          Procedures         Results for orders placed or performed during the hospital encounter of 03/01/22 (from the past 24 hour(s))   Basic metabolic panel   Result Value Ref Range    Sodium 137 133 - 144 mmol/L    Potassium 2.9 (L) 3.4 - 5.3 mmol/L    Chloride 101 94 - 109 mmol/L    Carbon Dioxide (CO2) 29 20 - 32 mmol/L    Anion Gap 7 3 - 14 mmol/L    Urea Nitrogen 26 7 - 30 mg/dL    Creatinine 1.05 0.66 - 1.25 mg/dL    Calcium 9.2 8.5 - 10.1 mg/dL    Glucose 138 (H) 70 - 99 mg/dL    GFR Estimate 85 >60 mL/min/1.73m2   CBC with platelets differential    Narrative    The following orders were created for panel order CBC with platelets differential.  Procedure                               Abnormality         Status                     ---------                               -----------         ------                     CBC with platelets and d...[[id number]]  Abnormal            Final result                 Please view results for these tests on the individual orders.   UA with Microscopic reflex to Culture    Specimen: Urine, Midstream   Result Value Ref Range    Color Urine Yellow Colorless, Straw, Light Yellow, Yellow    Appearance Urine Clear Clear    Glucose Urine Negative Negative mg/dL    Bilirubin Urine Negative Negative    Ketones  Urine Trace (A) Negative mg/dL    Specific Gravity Urine 1.020 1.003 - 1.035    Blood Urine Negative Negative    pH Urine 8.0 (H) 5.0 - 7.0    Protein Albumin Urine Negative Negative mg/dL    Urobilinogen Urine Normal Normal, 2.0 mg/dL    Nitrite Urine Negative Negative    Leukocyte Esterase Urine Negative Negative    Mucus Urine Present (A) None Seen /LPF    RBC Urine 1 <=2 /HPF    WBC Urine 1 <=5 /HPF    Narrative    Urine Culture not indicated   CBC with platelets and differential   Result Value Ref Range    WBC Count 13.1 (H) 4.0 - 11.0 10e3/uL    RBC Count 5.47 4.40 - 5.90 10e6/uL    Hemoglobin 16.9 13.3 - 17.7 g/dL    Hematocrit 46.8 40.0 - 53.0 %    MCV 86 78 - 100 fL    MCH 30.9 26.5 - 33.0 pg    MCHC 36.1 31.5 - 36.5 g/dL    RDW 12.6 10.0 - 15.0 %    Platelet Count 265 150 - 450 10e3/uL    % Neutrophils 78 %    % Lymphocytes 14 %    % Monocytes 6 %    % Eosinophils 1 %    % Basophils 1 %    % Immature Granulocytes 0 %    NRBCs per 100 WBC 0 <1 /100    Absolute Neutrophils 10.2 (H) 1.6 - 8.3 10e3/uL    Absolute Lymphocytes 1.9 0.8 - 5.3 10e3/uL    Absolute Monocytes 0.8 0.0 - 1.3 10e3/uL    Absolute Eosinophils 0.1 0.0 - 0.7 10e3/uL    Absolute Basophils 0.1 0.0 - 0.2 10e3/uL    Absolute Immature Granulocytes 0.1 <=0.4 10e3/uL    Absolute NRBCs 0.0 10e3/uL   CT Abdomen Pelvis w/o Contrast    Narrative    CT ABDOMEN/PELVIS WITHOUT CONTRAST March 1, 2022 2:14 PM    CLINICAL HISTORY: Flank pain, kidney stone suspected.    TECHNIQUE: CT scan of the abdomen and pelvis was performed without IV  contrast. Multiplanar reformats were obtained. Dose reduction  techniques were used.  CONTRAST: None.    COMPARISON: None.    FINDINGS:   LOWER CHEST: Normal.    HEPATOBILIARY: Hepatic steatosis. No acute abnormality. Tiny gallstone  within the gallbladder lumen image 66.    PANCREAS: Normal.    SPLEEN: Normal.    ADRENAL GLANDS: Normal.    KIDNEYS/BLADDER: Right ureterovesical junction stone measures 0.3 cm  image 209 just  in the bladder lumen. Right hydronephrosis. Moderate  right renal edema. 0.1 cm nonobstructing stones within the left  kidney. This is best viewed at coronal imaging.    BOWEL: No acute abnormality. Normal appendix.    LYMPH NODES: Normal.    VASCULATURE: Mild calcifications.    PELVIC ORGANS: Prostate measures 5 cm.    OTHER: None.    MUSCULOSKELETAL: Normal.      Impression    IMPRESSION:   1.  Right ureterovesical junction stone is 0.3 cm. Mild right  hydronephrosis. Right renal edema.  2.  Tiny left intrarenal stones.  3.  Fatty liver.    MAYTE BELL MD         SYSTEM ID:  [id number]       Medications   ondansetron (ZOFRAN) injection 4 mg (4 mg Intravenous Given 3/1/22 1400)   0.9% sodium chloride BOLUS (0 mLs Intravenous Stopped 3/1/22 1502)     Followed by   sodium chloride 0.9% infusion (has no administration in time range)   ketorolac (TORADOL) injection 30 mg (30 mg Intravenous Given 3/1/22 1403)          Assessments & Plan (with Medical Decision Making)  Salvador Crespo is a 51 year old male who presented to the ED complaining of acute right flank pain that started 4 hours prior to arrival.  Associated nausea and vomiting and difficulty with urination.  On arrival he was hypertensive but otherwise had normal vital signs.  Tenderness noted to the right flank into the right lower abdomen.  High suspicion for kidney stone at this time.  He was averse to any IV narcotics so we did give him Toradol and Zofran with fluids instead which did improve his symptoms greatly.  Labs were reviewed, he did have a mildly elevated white count of 13.1, potassium was low at 2.9 but otherwise unremarkable. Urinalysis showed overall no acute abnormalities.  CT scan however confirmed a right ureterovesical junction stone measuring 0.3 cm with associated mild right hydronephrosis and right renal edema.  I discussed these results with the patient.  Clinically without signs of associated infection and pain at now adequately controlled,  this can be managed in the outpatient setting.  Patient is comfortable with this plan.  He will be given Flomax to alleviate passage of stone, along with ibuprofen, Norco for pain and Zofran for nausea.  In regard to his potassium, patient noted that he had been prescribed potassium pills but has not taken them because they are too big to swallow.  I suggested he try cutting them in half or quarters to take him instead which she agrees to do so when he gets home.  I encouraged him to strain his urine and collect the stone for testing if possible.  He was given instructions on when to return to the ED.  All questions were answered and patient was discharged home in suitable condition.     I have reviewed the nursing notes.    I have reviewed the findings, diagnosis, plan and need for follow up with the patient.    New Prescriptions    HYDROCODONE-ACETAMINOPHEN (NORCO) 5-325 MG TABLET    Take 1 tablet by mouth every 6 hours as needed for severe pain    IBUPROFEN (ADVIL/MOTRIN) 800 MG TABLET    Take 1 tablet (800 mg) by mouth every 8 hours as needed for moderate pain    ONDANSETRON (ZOFRAN ODT) 4 MG ODT TAB    Take 1 tablet (4 mg) by mouth every 8 hours as needed    TAMSULOSIN (FLOMAX) 0.4 MG CAPSULE    Take 1 capsule (0.4 mg) by mouth daily for 7 doses       Final diagnoses:   Hydronephrosis with urinary obstruction due to ureteral calculus     Note: Chart documentation done in part with Dragon Voice Recognition software. Although reviewed after completion, some word and grammatical errors may remain.      3/1/2022   [facility] EMERGENCY DEPT     Lolita Schaeffer PA-C  03/01/22 [id number]

## 2022-03-05 ENCOUNTER — LAB (OUTPATIENT)
Dept: LAB | Facility: CLINIC | Age: 52
End: 2022-03-05
Attending: FAMILY MEDICINE
Payer: COMMERCIAL

## 2022-03-05 DIAGNOSIS — Z11.59 ENCOUNTER FOR SCREENING FOR OTHER VIRAL DISEASES: ICD-10-CM

## 2022-04-04 DIAGNOSIS — E11.65 TYPE 2 DIABETES MELLITUS WITH HYPERGLYCEMIA, WITHOUT LONG-TERM CURRENT USE OF INSULIN (H): ICD-10-CM

## 2022-04-04 RX ORDER — METFORMIN HCL 500 MG
TABLET, EXTENDED RELEASE 24 HR ORAL
Qty: 120 TABLET | Refills: 2 | Status: SHIPPED | OUTPATIENT
Start: 2022-04-04 | End: 2022-07-06

## 2022-05-08 ENCOUNTER — HEALTH MAINTENANCE LETTER (OUTPATIENT)
Age: 52
End: 2022-05-08

## 2022-05-09 ENCOUNTER — LAB (OUTPATIENT)
Dept: LAB | Facility: CLINIC | Age: 52
End: 2022-05-09
Payer: COMMERCIAL

## 2022-05-09 DIAGNOSIS — I10 HYPERTENSION, UNSPECIFIED TYPE: ICD-10-CM

## 2022-05-09 DIAGNOSIS — E11.65 TYPE 2 DIABETES MELLITUS WITH HYPERGLYCEMIA, WITHOUT LONG-TERM CURRENT USE OF INSULIN (H): ICD-10-CM

## 2022-05-09 DIAGNOSIS — E87.6 HYPOKALEMIA: ICD-10-CM

## 2022-05-09 LAB
ANION GAP SERPL CALCULATED.3IONS-SCNC: 6 MMOL/L (ref 3–14)
BUN SERPL-MCNC: 20 MG/DL (ref 7–30)
CALCIUM SERPL-MCNC: 9.8 MG/DL (ref 8.5–10.1)
CHLORIDE BLD-SCNC: 103 MMOL/L (ref 94–109)
CO2 SERPL-SCNC: 31 MMOL/L (ref 20–32)
CREAT SERPL-MCNC: 0.87 MG/DL (ref 0.66–1.25)
GFR SERPL CREATININE-BSD FRML MDRD: >90 ML/MIN/1.73M2
GLUCOSE BLD-MCNC: 147 MG/DL (ref 70–99)
HBA1C MFR BLD: 6.5 % (ref 0–5.6)
POTASSIUM BLD-SCNC: 3.1 MMOL/L (ref 3.4–5.3)
SODIUM SERPL-SCNC: 140 MMOL/L (ref 133–144)

## 2022-05-09 PROCEDURE — 83036 HEMOGLOBIN GLYCOSYLATED A1C: CPT

## 2022-05-09 PROCEDURE — 80048 BASIC METABOLIC PNL TOTAL CA: CPT

## 2022-05-09 PROCEDURE — 36415 COLL VENOUS BLD VENIPUNCTURE: CPT

## 2022-06-21 DIAGNOSIS — I10 HYPERTENSION, UNSPECIFIED TYPE: ICD-10-CM

## 2022-06-23 DIAGNOSIS — E87.6 HYPOKALEMIA: Primary | ICD-10-CM

## 2022-06-23 RX ORDER — LOSARTAN POTASSIUM AND HYDROCHLOROTHIAZIDE 25; 100 MG/1; MG/1
TABLET ORAL
Qty: 30 TABLET | Refills: 1 | Status: SHIPPED | OUTPATIENT
Start: 2022-06-23 | End: 2022-08-23

## 2022-06-23 NOTE — TELEPHONE ENCOUNTER
Needs basic panel within 2 weeks- please ehlp him get that set up with lab- order placed.JM Watson MD

## 2022-06-23 NOTE — TELEPHONE ENCOUNTER
Pending Prescriptions:                       Disp   Refills    losartan-hydrochlorothiazide (HYZAAR) 100-*30 tab*0        Sig: Take 1 tablet by mouth once daily    Routing refill request to provider for review/approval because:  Labs out of range:    BP Readings from Last 3 Encounters:   03/01/22 (!) 143/96   01/11/22 120/78   01/04/22 124/80        Serum potassium elevated.

## 2022-06-24 ENCOUNTER — LAB (OUTPATIENT)
Dept: LAB | Facility: CLINIC | Age: 52
End: 2022-06-24
Payer: COMMERCIAL

## 2022-06-24 DIAGNOSIS — E87.6 HYPOKALEMIA: ICD-10-CM

## 2022-06-24 LAB
ANION GAP SERPL CALCULATED.3IONS-SCNC: 5 MMOL/L (ref 3–14)
BUN SERPL-MCNC: 21 MG/DL (ref 7–30)
CALCIUM SERPL-MCNC: 9.6 MG/DL (ref 8.5–10.1)
CHLORIDE BLD-SCNC: 102 MMOL/L (ref 94–109)
CO2 SERPL-SCNC: 29 MMOL/L (ref 20–32)
CREAT SERPL-MCNC: 0.78 MG/DL (ref 0.66–1.25)
GFR SERPL CREATININE-BSD FRML MDRD: >90 ML/MIN/1.73M2
GLUCOSE BLD-MCNC: 109 MG/DL (ref 70–99)
POTASSIUM BLD-SCNC: 3.2 MMOL/L (ref 3.4–5.3)
SODIUM SERPL-SCNC: 136 MMOL/L (ref 133–144)

## 2022-06-24 PROCEDURE — 36415 COLL VENOUS BLD VENIPUNCTURE: CPT

## 2022-06-24 PROCEDURE — 80048 BASIC METABOLIC PNL TOTAL CA: CPT

## 2022-06-26 DIAGNOSIS — E87.6 HYPOKALEMIA: Primary | ICD-10-CM

## 2022-07-05 DIAGNOSIS — E11.65 TYPE 2 DIABETES MELLITUS WITH HYPERGLYCEMIA, WITHOUT LONG-TERM CURRENT USE OF INSULIN (H): ICD-10-CM

## 2022-07-05 DIAGNOSIS — I10 HYPERTENSION, UNSPECIFIED TYPE: ICD-10-CM

## 2022-07-06 RX ORDER — AMLODIPINE BESYLATE 5 MG/1
TABLET ORAL
Qty: 90 TABLET | Refills: 0 | Status: SHIPPED | OUTPATIENT
Start: 2022-07-06 | End: 2022-10-05

## 2022-07-06 RX ORDER — METFORMIN HCL 500 MG
TABLET, EXTENDED RELEASE 24 HR ORAL
Qty: 120 TABLET | Refills: 0 | Status: SHIPPED | OUTPATIENT
Start: 2022-07-06 | End: 2022-08-03

## 2022-07-06 NOTE — TELEPHONE ENCOUNTER
Routing refill request to provider for review/approval because:  A break in medication  BP elevated    Len Grubbs RN

## 2022-08-19 DIAGNOSIS — I10 HYPERTENSION, UNSPECIFIED TYPE: ICD-10-CM

## 2022-08-23 RX ORDER — LOSARTAN POTASSIUM AND HYDROCHLOROTHIAZIDE 25; 100 MG/1; MG/1
TABLET ORAL
Qty: 30 TABLET | Refills: 0 | Status: SHIPPED | OUTPATIENT
Start: 2022-08-23 | End: 2022-09-07

## 2022-08-23 RX ORDER — METOPROLOL SUCCINATE 25 MG/1
TABLET, EXTENDED RELEASE ORAL
Qty: 30 TABLET | Refills: 0 | Status: SHIPPED | OUTPATIENT
Start: 2022-08-23 | End: 2022-09-07

## 2022-08-23 NOTE — TELEPHONE ENCOUNTER
"Routing refill request to provider for review/approval because:    Requested Prescriptions   Pending Prescriptions Disp Refills    metoprolol succinate ER (TOPROL XL) 25 MG 24 hr tablet [Pharmacy Med Name: Metoprolol Succinate ER 25 MG Oral Tablet Extended Release 24 Hour] 90 tablet 0     Sig: Take 1 tablet by mouth once daily        Beta-Blockers Protocol Failed - 8/19/2022 10:55 AM        Failed - Blood pressure under 140/90 in past 12 months       BP Readings from Last 3 Encounters:   03/01/22 (!) 143/96   01/11/22 120/78   01/04/22 124/80                 Passed - Patient is age 6 or older        Passed - Recent (12 mo) or future (30 days) visit within the authorizing provider's specialty     Patient has had an office visit with the authorizing provider or a provider within the authorizing providers department within the previous 12 mos or has a future within next 30 days. See \"Patient Info\" tab in inbasket, or \"Choose Columns\" in Meds & Orders section of the refill encounter.              Passed - Medication is active on med list                    "

## 2022-08-23 NOTE — TELEPHONE ENCOUNTER
"Routing refill request to provider for review/approval because:    Requested Prescriptions   Pending Prescriptions Disp Refills    losartan-hydrochlorothiazide (HYZAAR) 100-25 MG tablet [Pharmacy Med Name: Losartan Potassium-HCTZ 100-25 MG Oral Tablet] 30 tablet 0     Sig: Take 1 tablet by mouth once daily        Angiotensin-II Receptors Failed - 8/19/2022 10:55 AM        Failed - Last blood pressure under 140/90 in past 12 months       BP Readings from Last 3 Encounters:   03/01/22 (!) 143/96   01/11/22 120/78   01/04/22 124/80                 Failed - Normal serum potassium on file in past 12 months       Recent Labs   Lab Test 06/24/22  1348   POTASSIUM 3.2*                    Passed - Recent (12 mo) or future (30 days) visit within the authorizing provider's specialty     Patient has had an office visit with the authorizing provider or a provider within the authorizing providers department within the previous 12 mos or has a future within next 30 days. See \"Patient Info\" tab in inbasket, or \"Choose Columns\" in Meds & Orders section of the refill encounter.              Passed - Medication is active on med list        Passed - Patient is age 18 or older        Passed - Normal serum creatinine on file in past 12 months     Recent Labs   Lab Test 06/24/22  1348   CR 0.78       Ok to refill medication if creatinine is low         Diuretics (Including Combos) Protocol Failed - 8/19/2022 10:55 AM        Failed - Blood pressure under 140/90 in past 12 months       BP Readings from Last 3 Encounters:   03/01/22 (!) 143/96   01/11/22 120/78   01/04/22 124/80                 Failed - Normal serum potassium on file in past 12 months       Recent Labs   Lab Test 06/24/22  1348   POTASSIUM 3.2*                    Passed - Recent (12 mo) or future (30 days) visit within the authorizing provider's specialty     Patient has had an office visit with the authorizing provider or a provider within the authorizing providers " "department within the previous 12 mos or has a future within next 30 days. See \"Patient Info\" tab in inbasket, or \"Choose Columns\" in Meds & Orders section of the refill encounter.              Passed - Medication is active on med list        Passed - Patient is age 18 or older        Passed - Normal serum creatinine on file in past 12 months       Recent Labs   Lab Test 06/24/22  1348   CR 0.78              Passed - Normal serum sodium on file in past 12 months       Recent Labs   Lab Test 06/24/22  1348                             "

## 2022-08-23 NOTE — TELEPHONE ENCOUNTER
1 month supply refilled, please follow-up before med run out.  He needs a physical and general follow-up appointment, 40 minutes.

## 2022-08-23 NOTE — TELEPHONE ENCOUNTER
1 month supply refilled, please follow-up before med run out for physical and general/diabetes follow-up.  40-minute appointment.  Okay

## 2022-08-24 NOTE — TELEPHONE ENCOUNTER
Patient informed via mychart to schedule, 8/30 reminder if not read to send letter.   Imelda Leal MA

## 2022-08-28 ENCOUNTER — HEALTH MAINTENANCE LETTER (OUTPATIENT)
Age: 52
End: 2022-08-28

## 2022-09-02 DIAGNOSIS — E11.65 TYPE 2 DIABETES MELLITUS WITH HYPERGLYCEMIA, WITHOUT LONG-TERM CURRENT USE OF INSULIN (H): ICD-10-CM

## 2022-09-05 DIAGNOSIS — E11.65 TYPE 2 DIABETES MELLITUS WITH HYPERGLYCEMIA, WITHOUT LONG-TERM CURRENT USE OF INSULIN (H): ICD-10-CM

## 2022-09-06 RX ORDER — METFORMIN HCL 500 MG
TABLET, EXTENDED RELEASE 24 HR ORAL
Qty: 120 TABLET | Refills: 0 | Status: SHIPPED | OUTPATIENT
Start: 2022-09-06 | End: 2022-10-05

## 2022-09-06 NOTE — TELEPHONE ENCOUNTER
"Requested Prescriptions   Pending Prescriptions Disp Refills    metFORMIN (GLUCOPHAGE XR) 500 MG 24 hr tablet [Pharmacy Med Name: metFORMIN HCl  MG Oral Tablet Extended Release 24 Hour] 120 tablet 0     Sig: Take 2 tablets by mouth twice daily        Biguanide Agents Failed - 9/6/2022  9:13 AM        Failed - Recent (6 mo) or future (30 days) visit within the authorizing provider's specialty     Patient had office visit in the last 6 months or has a visit in the next 30 days with authorizing provider or within the authorizing provider's specialty.  See \"Patient Info\" tab in inbasket, or \"Choose Columns\" in Meds & Orders section of the refill encounter.            Passed - Patient is age 10 or older        Passed - Patient has documented A1c within the specified period of time.     If HgbA1C is 8 or greater, it needs to be on file within the past 3 months.  If less than 8, must be on file within the past 6 months.     Recent Labs   Lab Test 05/09/22  1143   A1C 6.5*             Passed - Patient's CR is NOT>1.4 OR Patient's EGFR is NOT<45 within past 12 mos.       Recent Labs   Lab Test 06/24/22  1348 09/28/21  1041 07/09/21  1429   GFRESTIMATED >90   < > >90   GFRESTBLACK  --   --  >90    < > = values in this interval not displayed.       Recent Labs   Lab Test 06/24/22  1348   CR 0.78             Passed - Patient does NOT have a diagnosis of CHF.        Passed - Medication is active on med list              KATIUSKA JavierN, RN     "

## 2022-09-06 NOTE — TELEPHONE ENCOUNTER
1 month supply refill, must follow-up before med run out.  Thank you   Davida Dunham is a 60 y.o. male who was seen in clinic today (7/21/2017). Assessment & Plan:  Diagnoses and all orders for this visit:    1. Diabetes mellitus with microalbuminuria ([facility].)- poorly controlled, long term diabetic complications discussed, reviewed following up with specialists and/or referrals placed below and continue current medications pending review of labs. -     METABOLIC PANEL, COMPREHENSIVE  -     HEMOGLOBIN A1C WITH EAG  -      DIABETES FOOT EXAM    2. CLL (chronic lymphocytic leukemia) ([facility]- stable, continue current treatment, defer to specialist, sister reports recent imaging nl. 3. Hypertension, essential- well controlled, normally not this low, continue current treatment   -     METABOLIC PANEL, COMPREHENSIVE    4. Pure hypercholesterolemia- at goal, continue current treatment pending review of labs   -     METABOLIC PANEL, COMPREHENSIVE    5. Cough- new dx, is improving per reports, will attempt [facility], will defer another round of antibiotics and treat with meds below. Reviewed duration that cough may linger. Red flags reviewed with sister to notify me. -     benzonatate (TESSALON) 200 mg capsule; Take 1 Cap by mouth three (3) times daily as needed for Cough for up to 7 days. -     albuterol (PROVENTIL HFA, VENTOLIN HFA, PROAIR HFA) 90 mcg/actuation inhaler; Take 1-2 Puffs by inhalation every six (6) hours as needed for Shortness of Breath (coughing). 6. Colon cancer screening-reviewed options for screening, elected to start with testing below. -     OCCULT BLOOD, IMMUNOASSAY (FIT)    7. Encounter for immunization  -     pneumococcal 13 leny conj dip (PREVNAR-13) 0.5 mL syrg injection; 0.5 mL by IntraMUSCular route once for 1 dose. Follow-up Disposition:  Return in about 6 months (around 1/21/2018) for FULL PHYSICAL - 30 minutes.        ----------------------------------------------------------------------    Subjective:  Dyaan Blue was seen today for Diabetes; Hypertension; Cholesterol Problem; and Cold Symptoms    Endocrine Review  He is seen for diabetes. Since last visit he reports: no changes. Home glucose monitoring: is not performed  He reports medication compliance: compliant all of the time. Medication side effects: none. Diabetic diet compliance: noncompliant much of the time. Lab review: labs reviewed and discussed with patient (snacking regularly). Eye exam: overdue, scheduled for August.        Cardiovascular Review  The patient has hypertension and hyperlipidemia. He reports taking medications as instructed, no medication side effects noted, patient does not perform home BP monitoring. Diet and Lifestyle: generally follows a low fat low cholesterol diet, generally follows a low sodium diet, had been exercising more regularly until this infection. Lab review: labs reviewed and discussed with patient. Hospital Follow Up  Alka Elizondo is seen for follow up from recent urgent care visit to [facility] on 7/15/2017. We requested the the records. He presented with cough and not feeling well. He was diagnosed with walking pneumonia. He took his medications (clarithromycin & Robitussin-AC) as directed & without any side effects. He reports symptoms are significantly improved but not back baseline.         ----------------------------------------------------------------------      Prior to Admission medications    Medication Sig Start Date End Date Taking? Authorizing Provider   glipiZIDE SR (GLUCOTROL XL) 5 mg CR tablet TAKE 1 TAB BY MOUTH DAILY. 7/5/17  Yes Erin Barnett MD   Lancets misc Test BS daily. DX:250.00 2/22/17  Yes Erin Barnett MD   glucose blood VI test strips (ASCENSIA AUTODISC VI, ONE TOUCH ULTRA TEST VI) strip Test BS daily. DX:250.00 2/22/17  Yes Erin Barnett MD   trandolapril (MAVIK) 2 mg tablet Take 1 Tab by mouth daily.  2/22/17  Yes Erin Barnett MD   SITagliptin-metFORMIN [facility])  mg per tablet Take 1 Tab by mouth two (2) times daily (with meals). 2/22/17  Yes Erin Barnett MD   lovastatin (MEVACOR) 40 mg tablet Take 1 Tab by mouth daily. 2/22/17  Yes Erin Barnett MD   ibrutinib (IMBRUVICA) 140 mg capsule Take 420 mg by mouth. Yes Historical Provider   Blood-Glucose Meter monitoring kit Test BS daily. DX:250.00 7/28/15  Yes Erin Barnett MD   multivitamin (ONE A DAY) tablet Take 1 Tab by mouth daily. Yes Historical Provider   aspirin delayed-release 81 mg tablet Take 1 Tab by mouth daily. 8/16/16   Erin Barnett MD          Allergies   Allergen Reactions    Pcn [Penicillins] Anaphylaxis           Review of Systems   Constitutional: Negative for malaise/fatigue and weight loss. Respiratory: Positive for cough. Negative for shortness of breath. Cardiovascular: Negative for chest pain, palpitations and leg swelling. Gastrointestinal: Negative for abdominal pain, constipation, diarrhea, heartburn, nausea and vomiting. Genitourinary: Negative for frequency. Musculoskeletal: Negative for joint pain and myalgias. Skin: Negative for rash. Neurological: Negative for tingling, sensory change, focal weakness and headaches. Psychiatric/Behavioral: Negative for depression. The patient is not nervous/anxious and does not have insomnia. Objective:   Physical Exam   Constitutional: No distress. Cardiovascular: Regular rhythm and normal heart sounds. No murmur heard. Pulmonary/Chest: Effort normal and breath sounds normal. He has no wheezes. He has no rales. Abdominal: Soft. Bowel sounds are normal. He exhibits no mass. There is no hepatosplenomegaly. There is no tenderness. Musculoskeletal: He exhibits no edema. Neurological:   Monofilament intact bilaterally. Pulses R: 1+ and L: 1+. L foot there is hypertrophied skin at the base of the 1st toe. No open lesion   Psychiatric: He has a normal mood and affect.          Visit Vitals    /80    Pulse (!) 120    Temp 98.1 °F (36.7 °C) (Oral)    Resp 20    Ht 5' 8\" (1.727 m)    Wt 192 lb (87.1 kg)    SpO2 96%    BMI 29.19 kg/m2         Disclaimer:  Advised him to call back or return to office if symptoms worsen/change/persist.  Discussed expected course/resolution/complications of diagnosis in detail with patient. Medication risks/benefits/costs/interactions/alternatives discussed with patient. He was given an after visit summary which includes diagnoses, current medications, & vitals. He expressed understanding with the diagnosis and plan.         Sophia Gonzales MD

## 2022-09-07 DIAGNOSIS — I10 HYPERTENSION, UNSPECIFIED TYPE: ICD-10-CM

## 2022-09-07 RX ORDER — METFORMIN HCL 500 MG
TABLET, EXTENDED RELEASE 24 HR ORAL
Qty: 120 TABLET | Refills: 0 | OUTPATIENT
Start: 2022-09-07

## 2022-09-07 NOTE — TELEPHONE ENCOUNTER
Patient informed via mychart to schedule, 9/12 reminder if not read to send letter.   Imelda Leal MA

## 2022-09-07 NOTE — TELEPHONE ENCOUNTER
Reason for Call:  Medication or medication refill: Patient has appt scheduled for 10/4 but will need the below 3 prescriptions refilled before that    Do you use a [facility]?  Name of the pharmacy and phone number for the current request:  [facility] - [phone]     Name of the medication requested:   metoprolol - 25 mg  amlodopine - 5mg  losartan - 25 mg    Other request: Salvador said he has about a 2 week supply left for these.    Can we leave a detailed message on this number? YES    Phone number patient can be reached at: Home number on file [phone] (home)    Best Time: anytime    Call taken on 9/7/2022 at 11:15 AM by Juana Holley

## 2022-09-07 NOTE — TELEPHONE ENCOUNTER
Sent 9/6/22, with 6 month supply. Should not need refills at this time    Marva Ryan RN on 9/7/2022 at 4:11 PM

## 2022-09-09 RX ORDER — AMLODIPINE BESYLATE 5 MG/1
5 TABLET ORAL DAILY
Qty: 90 TABLET | Refills: 0 | OUTPATIENT
Start: 2022-09-09

## 2022-09-09 NOTE — TELEPHONE ENCOUNTER
Metoprolol  Routing refill request to provider for review/approval because:  BP elevated  Hyzaar  Routing refill request to provider for review/approval because:  Labs out of range:  K+  BP elevated    Len Grubbs RN

## 2022-09-10 RX ORDER — LOSARTAN POTASSIUM AND HYDROCHLOROTHIAZIDE 25; 100 MG/1; MG/1
1 TABLET ORAL DAILY
Qty: 30 TABLET | Refills: 0 | Status: SHIPPED | OUTPATIENT
Start: 2022-09-10 | End: 2022-10-06

## 2022-09-10 RX ORDER — METOPROLOL SUCCINATE 25 MG/1
25 TABLET, EXTENDED RELEASE ORAL DAILY
Qty: 30 TABLET | Refills: 0 | Status: SHIPPED | OUTPATIENT
Start: 2022-09-10 | End: 2022-10-05

## 2022-10-05 ENCOUNTER — OFFICE VISIT (OUTPATIENT)
Dept: FAMILY MEDICINE | Facility: CLINIC | Age: 52
End: 2022-10-05
Payer: COMMERCIAL

## 2022-10-05 VITALS
WEIGHT: 208 LBS | OXYGEN SATURATION: 98 % | SYSTOLIC BLOOD PRESSURE: 134 MMHG | RESPIRATION RATE: 16 BRPM | BODY MASS INDEX: 27.45 KG/M2 | HEART RATE: 97 BPM | DIASTOLIC BLOOD PRESSURE: 88 MMHG | TEMPERATURE: 97.6 F

## 2022-10-05 DIAGNOSIS — N52.9 ERECTILE DYSFUNCTION, UNSPECIFIED ERECTILE DYSFUNCTION TYPE: ICD-10-CM

## 2022-10-05 DIAGNOSIS — I10 HYPERTENSION, UNSPECIFIED TYPE: ICD-10-CM

## 2022-10-05 DIAGNOSIS — E11.65 TYPE 2 DIABETES MELLITUS WITH HYPERGLYCEMIA, WITHOUT LONG-TERM CURRENT USE OF INSULIN (H): Primary | ICD-10-CM

## 2022-10-05 DIAGNOSIS — Z86.73 HISTORY OF TIA (TRANSIENT ISCHEMIC ATTACK) AND STROKE: ICD-10-CM

## 2022-10-05 DIAGNOSIS — Z12.11 SCREEN FOR COLON CANCER: ICD-10-CM

## 2022-10-05 LAB
ANION GAP SERPL CALCULATED.3IONS-SCNC: 5 MMOL/L (ref 3–14)
BUN SERPL-MCNC: 22 MG/DL (ref 7–30)
CALCIUM SERPL-MCNC: 9.3 MG/DL (ref 8.5–10.1)
CHLORIDE BLD-SCNC: 106 MMOL/L (ref 94–109)
CHOLEST SERPL-MCNC: 151 MG/DL
CO2 SERPL-SCNC: 28 MMOL/L (ref 20–32)
CREAT SERPL-MCNC: 0.79 MG/DL (ref 0.66–1.25)
FASTING STATUS PATIENT QL REPORTED: NO
GFR SERPL CREATININE-BSD FRML MDRD: >90 ML/MIN/1.73M2
GLUCOSE BLD-MCNC: 106 MG/DL (ref 70–99)
HBA1C MFR BLD: 6.3 % (ref 0–5.6)
HDLC SERPL-MCNC: 27 MG/DL
LDLC SERPL CALC-MCNC: 78 MG/DL
NONHDLC SERPL-MCNC: 124 MG/DL
POTASSIUM BLD-SCNC: 3.5 MMOL/L (ref 3.4–5.3)
SODIUM SERPL-SCNC: 139 MMOL/L (ref 133–144)
TRIGL SERPL-MCNC: 230 MG/DL

## 2022-10-05 PROCEDURE — 80061 LIPID PANEL: CPT | Performed by: FAMILY MEDICINE

## 2022-10-05 PROCEDURE — 83036 HEMOGLOBIN GLYCOSYLATED A1C: CPT | Performed by: FAMILY MEDICINE

## 2022-10-05 PROCEDURE — 80048 BASIC METABOLIC PNL TOTAL CA: CPT | Performed by: FAMILY MEDICINE

## 2022-10-05 PROCEDURE — 99214 OFFICE O/P EST MOD 30 MIN: CPT | Performed by: FAMILY MEDICINE

## 2022-10-05 PROCEDURE — 36415 COLL VENOUS BLD VENIPUNCTURE: CPT | Performed by: FAMILY MEDICINE

## 2022-10-05 RX ORDER — METOPROLOL SUCCINATE 25 MG/1
25 TABLET, EXTENDED RELEASE ORAL DAILY
Qty: 90 TABLET | Refills: 1 | Status: SHIPPED | OUTPATIENT
Start: 2022-10-05 | End: 2023-04-05

## 2022-10-05 RX ORDER — AMLODIPINE BESYLATE 5 MG/1
5 TABLET ORAL DAILY
Qty: 90 TABLET | Refills: 1 | Status: SHIPPED | OUTPATIENT
Start: 2022-10-05 | End: 2023-04-05

## 2022-10-05 RX ORDER — METFORMIN HCL 500 MG
1000 TABLET, EXTENDED RELEASE 24 HR ORAL 2 TIMES DAILY
Qty: 120 TABLET | Refills: 0 | Status: SHIPPED | OUTPATIENT
Start: 2022-10-05 | End: 2022-12-02

## 2022-10-05 RX ORDER — ATORVASTATIN CALCIUM 10 MG/1
10 TABLET, FILM COATED ORAL DAILY
Qty: 90 TABLET | Refills: 3 | Status: SHIPPED | OUTPATIENT
Start: 2022-10-05 | End: 2023-10-02

## 2022-10-05 RX ORDER — SILDENAFIL 100 MG/1
100 TABLET, FILM COATED ORAL DAILY PRN
Qty: 30 TABLET | Refills: 11 | Status: SHIPPED | OUTPATIENT
Start: 2022-10-05 | End: 2023-11-15

## 2022-10-05 ASSESSMENT — PAIN SCALES - GENERAL: PAINLEVEL: NO PAIN (0)

## 2022-10-05 ASSESSMENT — PATIENT HEALTH QUESTIONNAIRE - PHQ9
10. IF YOU CHECKED OFF ANY PROBLEMS, HOW DIFFICULT HAVE THESE PROBLEMS MADE IT FOR YOU TO DO YOUR WORK, TAKE CARE OF THINGS AT HOME, OR GET ALONG WITH OTHER PEOPLE: SOMEWHAT DIFFICULT
SUM OF ALL RESPONSES TO PHQ QUESTIONS 1-9: 7
SUM OF ALL RESPONSES TO PHQ QUESTIONS 1-9: 7

## 2022-10-05 NOTE — PROGRESS NOTES
Assessment & Plan     (E11.65) Type 2 diabetes mellitus with hyperglycemia, without long-term current use of insulin (H)  (primary encounter diagnosis)  Comment: DM is controlled with today's Hgb A1c of 6.3, about the same as it was 3 months ago.  Also has high blood pressure, hyperlipidemia and history of TIA with a long history of heavy tobacco smoking.  Blood sugar has been controlled.  Will continue with the metformin at the current dose.  Continue with aspirin and Lipitor as well.  Discussed about the goal for fasting blood sugar and Hgb A1C.  Encouraged to continue working on healthy diet, exercising and weight management.  UTD for retinal exam and encouraged him to work with ophthalmologist closely as per his/her recommendation.  Encourage daily foot care and yearly dental care.  Labs as ordered.  F/U in 6 months.    Recommended COVID, shingle, pneumococcal and influenza vaccination but he declined.    Plan: HEMOGLOBIN A1C, Adult Eye  Referral,         metFORMIN (GLUCOPHAGE XR) 500 MG 24 hr tablet,         Basic metabolic panel  (Ca, Cl, CO2, Creat,         Gluc, K, Na, BUN)            (I10) Hypertension, unspecified type  Comment: BP is normal and stable.  Multiple risk factors as mentioned above.  Continue with the current doses of Hyzaar, amlodipine and metoprolol, been tolerating them well.  Emphasized on healthy/low salt diet, daily excercise and weight management.  Avoid high sugar/caffeine intake. Lab as ordered.  Follow up in 6 month, earlier as needed.    Plan: metoprolol succinate ER (TOPROL XL) 25 MG 24 hr        tablet, atorvastatin (LIPITOR) 10 MG tablet,         Basic metabolic panel  (Ca, Cl, CO2, Creat,         Gluc, K, Na, BUN), amLODIPine (NORVASC) 5 MG         tablet            (N52.9) Erectile dysfunction, unspecified erectile dysfunction type  Comment: Doing well with the Viagra with no side effect.  Viagra has been effective.  Will continue with our clinic as needed.   Instructed go to the ER if develop painful erections or erection the last more than 3 hours.  No contraindication identified.    Plan: sildenafil (VIAGRA) 100 MG tablet            (Z86.73) History of TIA (transient ischemic attack) and stroke  Comment: Last cholesterol level within normal LDL level.  He is strongly encouraged to stop drinking smoking.  Continue with the Lipitor and aspirin - long term treatment.  Blood pressure has been well controlled.  Diabetes is also controlled.  Symptoms that need to be seen to call in discussed.    Plan: Lipid panel reflex to direct LDL Non-fasting            (Z12.11) Screen for colon cancer  Comment: Discussed with him about options for colon cancer screening which include colonoscopy, Cologuard or FIT test.  Pros and cons of each option discussed.  He preferred Cologuard and therefore it was ordered.  He understands that positive Cologuard will need further evaluation with colonoscopy.  He was also informed that abnormal Cologuard is recommended to follow-up in 3 years.    Plan: COLOGUARD([employer])            Nicotine/Tobacco Cessation:  He reports that he has been smoking cigarettes. He has been smoking about 0.50 packs per day. He has never used smokeless tobacco.  Nicotine/Tobacco Cessation Plan:   Information offered: Patient not interested at this time      Return in about 6 months (around 4/5/2023) for Physical Exam, folow up diabetes, high blood pressure.    Cary Martinez Mai, MD  [facility] RICHARD Franco is a 51 year old male, presenting for the following health issues:    RECHECK (Thinks is way overdue for his A1c)      History of Present Illness       COPD:  He presents for follow up of COPD.  Overall, COPD symptoms are stable since last visit. He has same as usual fatigue or shortness of breath with exertion and same as usual shortness of breath at rest.  He sometimes coughs and does not have change in sputum. No recent fever. He  can walk 2-5 blocks without stopping to rest. He can walk 3 or more flights of stairs without resting.The patient has had no ED, urgent care, or hospital admissions because of COPD since the last visit.     Diabetes:   He presents for follow up of diabetes.  He is checking home blood glucose two times daily. He checks blood glucose after meals.  Blood glucose is never over 200 and never under 70. He is aware of hypoglycemia symptoms including shakiness and weakness. He has no concerns regarding his diabetes at this time.  He is having numbness in feet and burning in feet. The patient has not had a diabetic eye exam in the last 12 months.         Hypertension: He presents for follow up of hypertension.  He does check blood pressure  regularly outside of the clinic. Outside blood pressures have been over 140/90. He follows a low salt diet.      Today's PHQ-9         PHQ-9 Total Score: 7    PHQ-9 Q9 Thoughts of better off dead/self-harm past 2 weeks :   Not at all    How difficult have these problems made it for you to do your work, take care of things at home, or get along with other people: Somewhat difficult     Notes above reviewed and confirmed with patient.  Salvador is here today for DM, HTN and hyperlipidemia follow up.  Overall he is doing well, been taking the medications as prescribed with no side effects.  He takes Metformin for DM, amlodipine, Hyzaar and metoprolol for HTN and Lipitor for high cholesterol.  Tolerating the medications well.  Not checking BP but his blood sugar has been running from the 80s-130s fasting, no hypoglycemic or hypotensive symptoms.  Been working on eating healthy diet and walking couple times a week.  No headache, dizziness, or acute visual change.   Last eye exam was 3-4 months ago and was told it was normal.  No chest pain or sob.  No numbness or tingling sensation on feet or fingers.  No leg swelling, dyspnea or orthopnea.  No other concern.     Still smoking about half a pack  per day currently but used to smoke up to 1.5 pack/day for more than 35 years.  Not ready to quit at this time denied of excessive alcohol intake.    Also needs a refill for Viagra which has been working well with no side effect.    Review of Systems   Constitutional, HEENT, cardiovascular, pulmonary, gi and gu systems are negative, except as otherwise noted.      Objective    /88   Pulse 97   Temp 97.6  F (36.4  C) (Temporal)   Resp 16   Wt 94.3 kg (208 lb)   SpO2 98%   BMI 27.45 kg/m    Body mass index is 27.45 kg/m .  Physical Exam   GENERAL: healthy, alert and no distress, speaking full sentences  EYES: Eyes grossly normal to inspection, PERRL and conjunctivae and sclerae normal.  No nystagmus.  All 4 visual fields intact.  HENT: ear canals and TM's normal.  Nares are non-congested. Oropharynx is pink and moist. No tender with palpation to the sinuses.  NECK: Supple, no lymphadenopathy or thyromegaly.  RESP: lungs clear to auscultation - no rales, rhonchi or wheezes  CV: regular rate and rhythm, no murmur, no peripheral edema with strong pedal pulses bilaterally  ABDOMEN: soft, nontender, nondistended, no palpable masses or organomegaly with normal bowel sounds  MS: no gross musculoskeletal defects noted.  No focal weakness.  NEURO: Normal strength and tone, mentation intact and speech normal.  No focal neurological deficit  Diabetic foot exam: Monofilament was normal.  No calluses.  Skin dry and clean, no open wound.  Toenails look good.      Results for orders placed or performed in visit on 10/05/22   Lipid panel reflex to direct LDL Non-fasting     Status: Abnormal   Result Value Ref Range    Cholesterol 151 <200 mg/dL    Triglycerides 230 (H) <150 mg/dL    Direct Measure HDL 27 (L) >=40 mg/dL    LDL Cholesterol Calculated 78 <=100 mg/dL    Non HDL Cholesterol 124 <130 mg/dL    Patient Fasting > 8hrs? No     Narrative    Cholesterol  Desirable:  <200 mg/dL    Triglycerides  Normal:  Less than 150  mg/dL  Borderline High:  150-199 mg/dL  High:  200-499 mg/dL  Very High:  Greater than or equal to 500 mg/dL    Direct Measure HDL  Female:  Greater than or equal to 50 mg/dL   Male:  Greater than or equal to 40 mg/dL    LDL Cholesterol  Desirable:  <100mg/dL  Above Desirable:  100-129 mg/dL   Borderline High:  130-159 mg/dL   High:  160-189 mg/dL   Very High:  >= 190 mg/dL    Non HDL Cholesterol  Desirable:  130 mg/dL  Above Desirable:  130-159 mg/dL  Borderline High:  160-189 mg/dL  High:  190-219 mg/dL  Very High:  Greater than or equal to 220 mg/dL   HEMOGLOBIN A1C     Status: Abnormal   Result Value Ref Range    Hemoglobin A1C 6.3 (H) 0.0 - 5.6 %   Basic metabolic panel  (Ca, Cl, CO2, Creat, Gluc, K, Na, BUN)     Status: Abnormal   Result Value Ref Range    Sodium 139 133 - 144 mmol/L    Potassium 3.5 3.4 - 5.3 mmol/L    Chloride 106 94 - 109 mmol/L    Carbon Dioxide (CO2) 28 20 - 32 mmol/L    Anion Gap 5 3 - 14 mmol/L    Urea Nitrogen 22 7 - 30 mg/dL    Creatinine 0.79 0.66 - 1.25 mg/dL    Calcium 9.3 8.5 - 10.1 mg/dL    Glucose 106 (H) 70 - 99 mg/dL    GFR Estimate >90 >60 mL/min/1.73m2

## 2022-10-06 RX ORDER — LOSARTAN POTASSIUM AND HYDROCHLOROTHIAZIDE 25; 100 MG/1; MG/1
1 TABLET ORAL DAILY
Qty: 90 TABLET | Refills: 1 | Status: SHIPPED | OUTPATIENT
Start: 2022-10-06 | End: 2023-04-05

## 2022-12-17 DIAGNOSIS — E11.65 TYPE 2 DIABETES MELLITUS WITH HYPERGLYCEMIA, WITHOUT LONG-TERM CURRENT USE OF INSULIN (H): ICD-10-CM

## 2022-12-18 ENCOUNTER — NURSE TRIAGE (OUTPATIENT)
Dept: NURSING | Facility: CLINIC | Age: 52
End: 2022-12-18

## 2022-12-18 NOTE — TELEPHONE ENCOUNTER
Nurse Triage SBAR    Is this a 2nd Level Triage? NO    Situation: Ear congestion    Background: Patient calling, states that he has had a cold and now has ear congestion. He denies any recent ear injury, denies ear drainage. Denies ear pain.     Assessment: Ear congestion related to a cold    Protocol Recommended Disposition:   See PCP Within 3 Days, See More Appropriate Guideline    Recommendation:     Care advice given per protocol. Patient will call his clinic tomorrow for an appointment. He verbalized understanding and agreement with the plan of care.      N/A     LAURA LEBRON RN      Does the patient meet one of the following criteria for ADS visit consideration? 16+ years old, with an [facility] PCP     TIP  Providers, please consider if this condition is appropriate for management at one of our Acute and Diagnostic Services sites.     If patient is a good candidate, please use dotphrase <dot>triageresponse and select Refer to ADS to document.    Reason for Disposition    Part of a cold    Ear congestion present > 48 hours    Additional Information    Negative: Ear pain is main symptom    Negative: Followed an ear injury    Negative: Decreased hearing with nasal allergies    Negative: Hearing loss (complete or partial) is main symptom    Negative: Earwax is main concern    Negative: [1] Has nasal allergies AND [2] they are acting up    Negative: Earache persists > 1 hour    Negative: Pus or cloudy discharge from ear canal    Protocols used: HEARING LOSS OR CHANGE-A-AH, EAR - CONGESTION-A-AH

## 2022-12-19 RX ORDER — BLOOD SUGAR DIAGNOSTIC
STRIP MISCELLANEOUS
Qty: 100 STRIP | Refills: 0 | Status: SHIPPED | OUTPATIENT
Start: 2022-12-19 | End: 2023-02-14

## 2022-12-20 ENCOUNTER — TELEPHONE (OUTPATIENT)
Dept: FAMILY MEDICINE | Facility: CLINIC | Age: 52
End: 2022-12-20

## 2022-12-20 NOTE — TELEPHONE ENCOUNTER
Patient calling back to report his ears remain plugged on both sides after recent cold. He has tried Benadryl as suggested over the weekend and it has not helped. He said in the past ear flushes have helped. Would this be appropriate to schedule for an ear flush? Please advise as he is having a hard time hearing.     Chu Davis,KATIUSKAN, RN

## 2022-12-22 NOTE — TELEPHONE ENCOUNTER
I would not be able to see him this week  next week.  If any provider can see him for it, that would be great.  Otherwise have him come in at the beginning of the year.  I am sorry for the inconvenience.

## 2022-12-26 NOTE — TELEPHONE ENCOUNTER
Spoke with patient and informed of note below. Patient understood. States that it is clearing up for him now and must of been from his cold. Did not want to schedule at this time.     Closing encounter.   Imelda Leal MA

## 2023-02-13 DIAGNOSIS — E11.65 TYPE 2 DIABETES MELLITUS WITH HYPERGLYCEMIA, WITHOUT LONG-TERM CURRENT USE OF INSULIN (H): ICD-10-CM

## 2023-02-14 RX ORDER — BLOOD SUGAR DIAGNOSTIC
STRIP MISCELLANEOUS
Qty: 100 STRIP | Refills: 2 | Status: SHIPPED | OUTPATIENT
Start: 2023-02-14 | End: 2023-07-11

## 2023-03-14 ENCOUNTER — MYC MEDICAL ADVICE (OUTPATIENT)
Dept: FAMILY MEDICINE | Facility: CLINIC | Age: 53
End: 2023-03-14
Payer: COMMERCIAL

## 2023-03-14 DIAGNOSIS — Z12.11 COLON CANCER SCREENING: Primary | ICD-10-CM

## 2023-04-05 DIAGNOSIS — I10 HYPERTENSION, UNSPECIFIED TYPE: ICD-10-CM

## 2023-04-05 DIAGNOSIS — F17.200 TOBACCO USE DISORDER: ICD-10-CM

## 2023-04-05 RX ORDER — TIOTROPIUM BROMIDE INHALATION SPRAY 3.12 UG/1
SPRAY, METERED RESPIRATORY (INHALATION)
Qty: 4 G | Refills: 0 | Status: SHIPPED | OUTPATIENT
Start: 2023-04-05 | End: 2023-05-01

## 2023-04-05 RX ORDER — LOSARTAN POTASSIUM AND HYDROCHLOROTHIAZIDE 25; 100 MG/1; MG/1
TABLET ORAL
Qty: 90 TABLET | Refills: 0 | Status: SHIPPED | OUTPATIENT
Start: 2023-04-05 | End: 2023-05-01

## 2023-04-05 RX ORDER — AMLODIPINE BESYLATE 5 MG/1
TABLET ORAL
Qty: 90 TABLET | Refills: 0 | Status: SHIPPED | OUTPATIENT
Start: 2023-04-05 | End: 2023-05-01

## 2023-04-05 RX ORDER — METOPROLOL SUCCINATE 25 MG/1
TABLET, EXTENDED RELEASE ORAL
Qty: 90 TABLET | Refills: 0 | Status: SHIPPED | OUTPATIENT
Start: 2023-04-05 | End: 2023-05-01

## 2023-04-23 ENCOUNTER — HEALTH MAINTENANCE LETTER (OUTPATIENT)
Age: 53
End: 2023-04-23

## 2023-04-24 ASSESSMENT — PATIENT HEALTH QUESTIONNAIRE - PHQ9
SUM OF ALL RESPONSES TO PHQ QUESTIONS 1-9: 7
SUM OF ALL RESPONSES TO PHQ QUESTIONS 1-9: 7

## 2023-05-01 ENCOUNTER — OFFICE VISIT (OUTPATIENT)
Dept: FAMILY MEDICINE | Facility: CLINIC | Age: 53
End: 2023-05-01
Payer: COMMERCIAL

## 2023-05-01 ENCOUNTER — LAB (OUTPATIENT)
Dept: FAMILY MEDICINE | Facility: CLINIC | Age: 53
End: 2023-05-01

## 2023-05-01 VITALS
HEART RATE: 105 BPM | HEIGHT: 74 IN | BODY MASS INDEX: 26.31 KG/M2 | TEMPERATURE: 97.7 F | OXYGEN SATURATION: 97 % | RESPIRATION RATE: 18 BRPM | SYSTOLIC BLOOD PRESSURE: 138 MMHG | WEIGHT: 205 LBS | DIASTOLIC BLOOD PRESSURE: 84 MMHG

## 2023-05-01 DIAGNOSIS — Z00.00 ROUTINE GENERAL MEDICAL EXAMINATION AT A HEALTH CARE FACILITY: ICD-10-CM

## 2023-05-01 DIAGNOSIS — F51.04 PSYCHOPHYSIOLOGICAL INSOMNIA: ICD-10-CM

## 2023-05-01 DIAGNOSIS — Z86.73 HISTORY OF TIA (TRANSIENT ISCHEMIC ATTACK) AND STROKE: ICD-10-CM

## 2023-05-01 DIAGNOSIS — Z28.21 VACCINATION REFUSED BY PATIENT: ICD-10-CM

## 2023-05-01 DIAGNOSIS — F17.200 TOBACCO USE DISORDER: ICD-10-CM

## 2023-05-01 DIAGNOSIS — M19.90 ARTHRITIS: ICD-10-CM

## 2023-05-01 DIAGNOSIS — Z00.00 ROUTINE GENERAL MEDICAL EXAMINATION AT A HEALTH CARE FACILITY: Primary | ICD-10-CM

## 2023-05-01 DIAGNOSIS — E11.65 TYPE 2 DIABETES MELLITUS WITH HYPERGLYCEMIA, WITHOUT LONG-TERM CURRENT USE OF INSULIN (H): ICD-10-CM

## 2023-05-01 DIAGNOSIS — N52.9 ERECTILE DYSFUNCTION, UNSPECIFIED ERECTILE DYSFUNCTION TYPE: ICD-10-CM

## 2023-05-01 DIAGNOSIS — I10 HYPERTENSION, UNSPECIFIED TYPE: ICD-10-CM

## 2023-05-01 LAB
ALBUMIN SERPL BCG-MCNC: 4.2 G/DL (ref 3.5–5.2)
ALP SERPL-CCNC: 81 U/L (ref 40–129)
ALT SERPL W P-5'-P-CCNC: 29 U/L (ref 10–50)
ANION GAP SERPL CALCULATED.3IONS-SCNC: 14 MMOL/L (ref 7–15)
AST SERPL W P-5'-P-CCNC: 22 U/L (ref 10–50)
BILIRUB SERPL-MCNC: 0.2 MG/DL
BUN SERPL-MCNC: 21.9 MG/DL (ref 6–20)
CALCIUM SERPL-MCNC: 10 MG/DL (ref 8.6–10)
CHLORIDE SERPL-SCNC: 100 MMOL/L (ref 98–107)
CHOLEST SERPL-MCNC: 153 MG/DL
CREAT SERPL-MCNC: 0.79 MG/DL (ref 0.67–1.17)
DEPRECATED HCO3 PLAS-SCNC: 25 MMOL/L (ref 22–29)
GFR SERPL CREATININE-BSD FRML MDRD: >90 ML/MIN/1.73M2
GLUCOSE SERPL-MCNC: 135 MG/DL (ref 70–99)
HBA1C MFR BLD: 6.7 %
HBV SURFACE AB SERPL IA-ACNC: 0.57 M[IU]/ML
HBV SURFACE AB SERPL IA-ACNC: NONREACTIVE M[IU]/ML
HDLC SERPL-MCNC: 27 MG/DL
LDLC SERPL CALC-MCNC: 62 MG/DL
NONHDLC SERPL-MCNC: 126 MG/DL
POTASSIUM SERPL-SCNC: 3.5 MMOL/L (ref 3.4–5.3)
PROT SERPL-MCNC: 7.6 G/DL (ref 6.4–8.3)
PSA SERPL DL<=0.01 NG/ML-MCNC: 0.65 NG/ML (ref 0–3.5)
SODIUM SERPL-SCNC: 139 MMOL/L (ref 136–145)
TRIGL SERPL-MCNC: 318 MG/DL

## 2023-05-01 PROCEDURE — 99406 BEHAV CHNG SMOKING 3-10 MIN: CPT | Mod: 25 | Performed by: FAMILY MEDICINE

## 2023-05-01 PROCEDURE — 99207 PR FOOT EXAM NO CHARGE: CPT | Mod: 25 | Performed by: FAMILY MEDICINE

## 2023-05-01 PROCEDURE — 86706 HEP B SURFACE ANTIBODY: CPT | Performed by: FAMILY MEDICINE

## 2023-05-01 PROCEDURE — 99396 PREV VISIT EST AGE 40-64: CPT | Performed by: FAMILY MEDICINE

## 2023-05-01 PROCEDURE — 80061 LIPID PANEL: CPT | Performed by: FAMILY MEDICINE

## 2023-05-01 PROCEDURE — 36415 COLL VENOUS BLD VENIPUNCTURE: CPT | Performed by: FAMILY MEDICINE

## 2023-05-01 PROCEDURE — 99214 OFFICE O/P EST MOD 30 MIN: CPT | Mod: 25 | Performed by: FAMILY MEDICINE

## 2023-05-01 PROCEDURE — 80053 COMPREHEN METABOLIC PANEL: CPT | Performed by: FAMILY MEDICINE

## 2023-05-01 PROCEDURE — G0103 PSA SCREENING: HCPCS | Performed by: FAMILY MEDICINE

## 2023-05-01 PROCEDURE — 83036 HEMOGLOBIN GLYCOSYLATED A1C: CPT | Performed by: FAMILY MEDICINE

## 2023-05-01 RX ORDER — POTASSIUM CHLORIDE 1500 MG/1
TABLET, EXTENDED RELEASE ORAL
Qty: 90 TABLET | Refills: 4 | Status: CANCELLED | OUTPATIENT
Start: 2023-05-01

## 2023-05-01 RX ORDER — TRAZODONE HYDROCHLORIDE 50 MG/1
50-100 TABLET, FILM COATED ORAL AT BEDTIME
Qty: 60 TABLET | Refills: 0 | Status: SHIPPED | OUTPATIENT
Start: 2023-05-01 | End: 2024-04-17

## 2023-05-01 RX ORDER — SILDENAFIL 100 MG/1
100 TABLET, FILM COATED ORAL DAILY PRN
Qty: 30 TABLET | Refills: 11 | Status: CANCELLED | OUTPATIENT
Start: 2023-05-01

## 2023-05-01 RX ORDER — IBUPROFEN 800 MG/1
800 TABLET, FILM COATED ORAL EVERY 8 HOURS PRN
Qty: 30 TABLET | Refills: 1 | Status: SHIPPED | OUTPATIENT
Start: 2023-05-01 | End: 2023-11-15

## 2023-05-01 RX ORDER — METOPROLOL SUCCINATE 25 MG/1
25 TABLET, EXTENDED RELEASE ORAL DAILY
Qty: 90 TABLET | Refills: 1 | Status: SHIPPED | OUTPATIENT
Start: 2023-05-01 | End: 2023-11-21

## 2023-05-01 RX ORDER — LOSARTAN POTASSIUM AND HYDROCHLOROTHIAZIDE 25; 100 MG/1; MG/1
1 TABLET ORAL DAILY
Qty: 90 TABLET | Refills: 1 | Status: SHIPPED | OUTPATIENT
Start: 2023-05-01 | End: 2023-11-21

## 2023-05-01 RX ORDER — METFORMIN HCL 500 MG
1000 TABLET, EXTENDED RELEASE 24 HR ORAL 2 TIMES DAILY
Qty: 360 TABLET | Refills: 1 | Status: CANCELLED | OUTPATIENT
Start: 2023-05-01

## 2023-05-01 RX ORDER — AMLODIPINE BESYLATE 5 MG/1
5 TABLET ORAL DAILY
Qty: 90 TABLET | Refills: 0 | Status: CANCELLED | OUTPATIENT
Start: 2023-05-01

## 2023-05-01 RX ORDER — TIOTROPIUM BROMIDE INHALATION SPRAY 3.12 UG/1
SPRAY, METERED RESPIRATORY (INHALATION)
Qty: 4 G | Refills: 11 | Status: SHIPPED | OUTPATIENT
Start: 2023-05-01 | End: 2024-05-30

## 2023-05-01 RX ORDER — ATORVASTATIN CALCIUM 10 MG/1
10 TABLET, FILM COATED ORAL DAILY
Qty: 90 TABLET | Refills: 3 | Status: CANCELLED | OUTPATIENT
Start: 2023-05-01

## 2023-05-01 RX ORDER — METOPROLOL SUCCINATE 25 MG/1
25 TABLET, EXTENDED RELEASE ORAL DAILY
Qty: 90 TABLET | Refills: 0 | Status: CANCELLED | OUTPATIENT
Start: 2023-05-01

## 2023-05-01 RX ORDER — AMLODIPINE BESYLATE 5 MG/1
5 TABLET ORAL DAILY
Qty: 90 TABLET | Refills: 1 | Status: SHIPPED | OUTPATIENT
Start: 2023-05-01 | End: 2023-11-21

## 2023-05-01 ASSESSMENT — PATIENT HEALTH QUESTIONNAIRE - PHQ9
10. IF YOU CHECKED OFF ANY PROBLEMS, HOW DIFFICULT HAVE THESE PROBLEMS MADE IT FOR YOU TO DO YOUR WORK, TAKE CARE OF THINGS AT HOME, OR GET ALONG WITH OTHER PEOPLE: NOT DIFFICULT AT ALL
SUM OF ALL RESPONSES TO PHQ QUESTIONS 1-9: 7

## 2023-05-01 ASSESSMENT — PAIN SCALES - GENERAL: PAINLEVEL: NO PAIN (0)

## 2023-05-01 NOTE — PROGRESS NOTES
Lung Cancer Screening Shared Decision Making Visit     Salvador Crespo, a 52 year old male, is eligible for lung cancer screening    History   Smoking Status     Every Day     Packs/day: 0.50     Types: Cigarettes   Smokeless Tobacco     Never       I have discussed with patient the risks and benefits of screening for lung cancer with low-dose CT.     The risks include:    radiation exposure: one low dose chest CT has as much ionizing radiation as about 15 chest x-rays, or 6 months of background radiation living in [place]      false positives: most findings/nodules are NOT cancer, but some might still require additional diagnostic evaluation, including biopsy    over-diagnosis: some slow growing cancers that might never have been clinically significant will be detected and treated unnecessarily     The benefit of early detection of lung cancer is contingent upon adherence to annual screening or more frequent follow up if indicated.     Furthermore, to benefit from screening, Salvador must be willing and able to undergo diagnostic procedures, if indicated. Although no specific guide is available for determining severity of comorbidities, it is reasonable to withhold screening in patients who have greater mortality risk from other diseases.     We did discuss that the best way to prevent lung cancer is to not smoke.    Some patients may value a numeric estimation of lung cancer risk when evaluating if lung cancer screening is right for them, here is one calculator:    ShouldIScreen  Answers for HPI/ROS submitted by the patient on 5/1/2023  If you checked off any problems, how difficult have these problems made it for you to do your work, take care of things at home, or get along with other people?: Not difficult at all  PHQ9 TOTAL SCORE: 7  Do you check your blood pressure regularly outside of the clinic?: Yes  Are your blood pressures ever more than 140 on the top number (systolic) OR more than 90 on the bottom number  (diastolic)? (For example, greater than 140/90): Yes  Are you following a low salt diet?: Yes  Frequency of checking blood sugars:: two times daily  What time of day are you checking your blood sugars : after meals, at bedtime  Have you had any blood sugars above 200?: No  Have you had any blood sugars below 70?: No  Hypoglycemia symptoms:: shakiness, weakness  Diabetic concerns:: none  Paraesthesia present:: numbness in feet  Have you had a diabetic eye exam within the last year?: No  Headache Symptoms are: no change  How often are you getting headaches or migraines? : Every other day or so  Are you able to do normal daily activities when you have a migraine?: Yes  Migraine Rescue/Relief Medications:: Ibuprofen (Advil, Motrin)  Effectiveness of rescue/relief medications:: The relief is inconsistent  Migraine Preventative Medications:: no medications to prevent migraines  ER or UC Visits:: 0 times  Current status of COPD symptom:: no change  Status of fatigue and dyspnea with ambulation:: same as usual  Status of dyspnea:: same as usual  Increase or change in cough or sputum:: rarely  Have you noticed a change in your sputum/phlegm?: No  Have you experienced a recent fever?: No  Baseline ambulation without stopping to rest:: less than a mile  Number of flights of stairs without resting:: 2  Have you had any Emergency Room, Urgent Care visits or a Hospital admission because of your COPD since your last office visit?: No  How many servings of fruits and vegetables do you eat daily?: 2-3  On average, how many sweetened beverages do you drink each day (Examples: soda, juice, sweet tea, etc.  Do NOT count diet or artificially sweetened beverages)?: 3  How many minutes a day do you exercise enough to make your heart beat faster?: 20 to 29  How many days a week do you exercise enough to make your heart beat faster?: 5  How many days per week do you miss taking your medication?: 0

## 2023-05-01 NOTE — PATIENT INSTRUCTIONS
Preventive Health Recommendations  Male Ages 50 - 64    Yearly exam:             See your health care provider every year in order to  o   Review health changes.   o   Discuss preventive care.    o   Review your medicines if your doctor has prescribed any.     Have a cholesterol test every 5 years, or more frequently if you are at risk for high cholesterol/heart disease.     Have a diabetes test (fasting glucose) every three years. If you are at risk for diabetes, you should have this test more often.     Have a colonoscopy at age 50, or have a yearly FIT test (stool test). These exams will check for colon cancer.      Talk with your health care provider about whether or not a prostate cancer screening test (PSA) is right for you.    You should be tested each year for STDs (sexually transmitted diseases), if you re at risk.     Shots: Get a flu shot each year. Get a tetanus shot every 10 years.     Nutrition:    Eat at least 5 servings of fruits and vegetables daily.     Eat whole-grain bread, whole-wheat pasta and brown rice instead of white grains and rice.     Get adequate Calcium and Vitamin D.     Lifestyle    Exercise for at least 150 minutes a week (30 minutes a day, 5 days a week). This will help you control your weight and prevent disease.     Limit alcohol to one drink per day.     No smoking.     Wear sunscreen to prevent skin cancer.     See your dentist every six months for an exam and cleaning.     See your eye doctor every 1 to 2 years.    Lung Cancer Screening   Frequently Asked Questions  If you are at high-risk for lung cancer, getting screened with low-dose computed tomography (LDCT) every year can help save your life. This handout offers answers to some of the most common questions about lung cancer screening. If you have other questions, please call [phone] ([phone]).     What is it?  Lung cancer screening uses special X-ray technology to create an image of your lung tissue.  The exam is quick and easy and takes less than 10 seconds. We don t give you any medicine or use any needles. You can eat before and after the exam. You don t need to change your clothes as long as the clothing on your chest doesn t contain metal. But, you do need to be able to hold your breath for at least 6 seconds during the exam.    What is the goal of lung cancer screening?  The goal of lung cancer screening is to save lives. Many times, lung cancer is not found until a person starts having physical symptoms. Lung cancer screening can help detect lung cancer in the earliest stages when it may be easier to treat.    Who should be screened for lung cancer?  We suggest lung cancer screening for anyone who is at high-risk for lung cancer. You are in the high-risk group if you:      are between the ages of 55 and 79, and    have smoked at least 1 pack of cigarettes a day for 20 or more years, and    still smoke or have quit within the past 15 years.    However, if you have a new cough or shortness of breath, you should talk to your doctor before being screened.    Why does it matter if I have symptoms?  Certain symptoms can be a sign that you have a condition in your lungs that should be checked and treated by your doctor. These symptoms include fever, chest pain, a new or changing cough, shortness of breath that you have never felt before, coughing up blood or unexplained weight loss. Having any of these symptoms can greatly affect the results of lung cancer screening.       Should all smokers get an LDCT lung cancer screening exam?  It depends. Lung cancer screening is for a very specific group of men and women who have a history of heavy smoking over a long period of time (see  Who should be screened for lung cancer  above).  I am in the high-risk group, but have been diagnosed with cancer in the past. Is LDCT lung cancer screening right for me?  In some cases, you should not have LDCT lung screening, such as  when your doctor is already following your cancer with CT scan studies. Your doctor will help you decide if LDCT lung screening is right for you.  Do I need to have a screening exam every year?  Yes. If you are in the high-risk group described earlier, you should get an LDCT lung cancer screening exam every year until you are 79, or are no longer willing or able to undergo screening and possible procedures to diagnose and treat lung cancer.  How effective is LDCT at preventing death from lung cancer?  Studies have shown that LDCT lung cancer screening can lower the risk of death from lung cancer by 20 percent in people who are at high-risk.  What are the risks?  There are some risks and limitations of LDCT lung cancer screening. We want to make sure you understand the risks and benefits, so please let us know if you have any questions. Your doctor may want to talk with you more about these risks.    Radiation exposure: As with any exam that uses radiation, there is a very small increased risk of cancer. The amount of radiation in LDCT is small--about the same amount a person would get from a mammogram. Your doctor orders the exam when he or she feels the potential benefits outweigh the risks.    False negatives: No test is perfect, including LDCT. It is possible that you may have a medical condition, including lung cancer, that is not found during your exam. This is called a false negative result.    False positives and more testing: LDCT very often finds something in the lung that could be cancer, but in fact is not. This is called a false positive result. False positive tests often cause anxiety. To make sure these findings are not cancer, you may need to have more tests. These tests will be done only if you give us permission. Sometimes patients need a treatment that can have side effects, such as a biopsy. For more information on false positives, see  What can I expect from the results?     Findings not related  to lung cancer: Your LDCT exam also takes pictures of areas of your body next to your lungs. In a very small number of cases, the CT scan will show an abnormal finding in one of these areas, such as your kidneys, adrenal glands, liver or thyroid. This finding may not be serious, but you may need more tests. Your doctor can help you decide what other tests you may need, if any.  What can I expect from the results?  About 1 out of 4 LDCT exams will find something that may need more tests. Most of the time, these findings are lung nodules. Lung nodules are very small collections of tissue in the lung. These nodules are very common, and the vast majority--more than 97 percent--are not cancer (benign). Most are normal lymph nodes or small areas of scarring from past infections.  But, if a small lung nodule is found to be cancer, the cancer can be cured more than 90 percent of the time. To know if the nodule is cancer, we may need to get more images before your next yearly screening exam. If the nodule has suspicious features (for example, it is large, has an odd shape or grows over time), we will refer you to a specialist for further testing.  Will my doctor also get the results?  Yes. Your doctor will get a copy of your results.  Is it okay to keep smoking now that there s a cancer screening exam?  No. Tobacco is one of the strongest cancer-causing agents. It causes not only lung cancer, but other cancers and cardiovascular (heart) diseases as well. The damage caused by smoking builds over time. This means that the longer you smoke, the higher your risk of disease. While it is never too late to quit, the sooner you quit, the better.  Where can I find help to quit smoking?  The best way to prevent lung cancer is to stop smoking. If you have already quit smoking, congratulations and keep it up! For help on quitting smoking, please call [facility] at 4-869-QUIT-NOW (1-366.223.5421) or the [employer] at  1-948.349.4574 to find local resources near you.  One-on-one health coaching:  If you d prefer to work individually with a health care provider on tobacco cessation, we offer:      Medication Therapy Management:  Our specially trained pharmacists work closely with you and your doctor to help you quit smoking.  Call [phone] or [phone] (toll free).

## 2023-05-01 NOTE — PROGRESS NOTES
SUBJECTIVE:   CC: Salvador is an 52 year old who presents for preventative health visit.         5/1/2023     8:32 AM   Additional Questions   Roomed by Jazmin FLORES   Accompanied by Self         5/1/2023     8:32 AM   Patient Reported Additional Medications   Patient reports taking the following new medications none        Patient has been advised of split billing requirements and indicates understanding: Yes     Healthy Habits:     Taking medications regularly:  0    PHQ-2 Total Score: 3  History of Present Illness       COPD:  He presents for follow up of COPD.  Overall, COPD symptoms are stable since last visit. He has same as usual fatigue or shortness of breath with exertion and same as usual shortness of breath at rest.  He rarely coughs and does not have change in sputum. No recent fever. He can walk less than a mile without stopping to rest. He can walk 2 flights of stairs without resting.The patient has had no ED, urgent care, or hospital admissions because of COPD since the last visit.     Diabetes:   He presents for follow up of diabetes.  He is checking home blood glucose two times daily. He checks blood glucose after meals and at bedtime.  Blood glucose is never over 200 and never under 70. He is aware of hypoglycemia symptoms including shakiness and weakness. He has no concerns regarding his diabetes at this time.  He is having numbness in feet. The patient has not had a diabetic eye exam in the last 12 months.         Hypertension: He presents for follow up of hypertension.  He does check blood pressure  regularly outside of the clinic. Outside blood pressures have been over 140/90. He follows a low salt diet.     Headaches:   Since the patient's last clinic visit, headaches are: no change  The patient is getting headaches:  Every other day or so  He is able to do normal daily activities when he has a migraine.  The patient is taking the following rescue/relief medications:  Ibuprofen (Advil, Motrin)  "  Patient states \"The relief is inconsistent\" from the rescue/relief medications.   The patient is taking the following medications to prevent migraines:  No medications to prevent migraines  In the past 4 weeks, the patient has gone to an Urgent Care or Emergency Room 0 times times due to headaches.    He eats 2-3 servings of fruits and vegetables daily.He consumes 3 sweetened beverage(s) daily.He exercises with enough effort to increase his heart rate 20 to 29 minutes per day.  He exercises with enough effort to increase his heart rate 5 days per week.   He is taking medications regularly.    Today's PHQ-9         PHQ-9 Total Score: 7    PHQ-9 Q9 Thoughts of better off dead/self-harm past 2 weeks :   Not at all    How difficult have these problems made it for you to do your work, take care of things at home, or get along with other people: Not difficult at all      Salvador is here today for physical and general follow-up.     1.  First is to follow up on DM, HTN and hyperlipidemia.  Overall he is doing well, been taking the medications as prescribed with no side effects.  He takes Metformin for DM, amlodipine, Hyzaar and metoprolol for HTN and Lipitor for high cholesterol.  Tolerating the medications well.  Not checking BP but his blood sugar has been running around 100s fasting, no hypoglycemic or hypotensive symptoms.  Been working on eating healthier diet and  has been walking 4-5 times a week.  No headache, dizziness, or acute visual change.   Last eye exam was 6 months ago and was told it was normal.  No chest pain or sob.  No numbness or tingling sensation on feet or fingers.  No leg swelling, dyspnea or orthopnea.  No other concern.     2.  He has a history of TIA multiple times.    Been taking the Lipitor, aspirin as prescribed.  Blood pressure and diabetes have been controlled.  Still smokes 1/2 pack a day, been cutting back and used to smoke over 1 ppd.  No headache or dizziness.  No focal weakness or " problems with speech. No double vision vision or blurred vision.     3.    Viagra has been effective with erectile dysfunction which he has had for several years.  He had trouble with both initiating and maintaining.  Not on nitro or any medication for prostate.     4.  Otherwise, he is doing well. No major medical care or procedure done since the last physical couple years ago. No N/V/D/C or problem with urination.  No abdominal pain.  Denied of STD risks.  No leg swelling, been having typical arthritic knee knee pain that he has had for a while.  Ibuprofen has been working well which he takes only as needed and rarely.    Walking about couple miles a day, 4-5 times a week.. Social drinker but no drug use.  Feels safe - lives with his wife.  No weight change and denied of being depressed. No other concern today      Today's PHQ-2 Score:       5/1/2023     8:34 AM   PHQ-2 ( 1999 [employer])   Q1: Little interest or pleasure in doing things 0   Q2: Feeling down, depressed or hopeless 0   PHQ-2 Score 0                 Last PSA:   PSA   Date Value Ref Range Status   07/09/2021 0.73 0 - 4 ug/L Final     Comment:     Assay Method:  Chemiluminescence using Siemens Vista analyzer     Prostate Specific Antigen Screen   Date Value Ref Range Status   05/01/2023 0.65 0.00 - 3.50 ng/mL Final       Reviewed orders with patient. Reviewed health maintenance and updated orders accordingly - Yes  BP Readings from Last 3 Encounters:   05/01/23 138/84   10/05/22 134/88   03/01/22 (!) 143/96    Wt Readings from Last 3 Encounters:   05/01/23 93 kg (205 lb)   10/05/22 94.3 kg (208 lb)   03/01/22 96.2 kg (212 lb)                  Patient Active Problem List   Diagnosis     Hypertension, unspecified type     History of TIA (transient ischemic attack) and stroke     Tobacco use disorder     Erectile dysfunction, unspecified erectile dysfunction type     Diabetes mellitus, type 2 (H)     Vaccination refused by patient     Past Surgical History:    Procedure Laterality Date     NO HISTORY OF SURGERY         Social History     Tobacco Use     Smoking status: Every Day     Packs/day: 0.50     Types: Cigarettes     Smokeless tobacco: Never   Vaping Use     Vaping status: Never Used   Substance Use Topics     Alcohol use: Not Currently     Family History   Problem Relation Age of Onset     Unknown/Adopted Mother      Unknown/Adopted Father      No Known Problems Sister      Aneurysm Brother          at 16     Unknown/Adopted Maternal Grandmother      Unknown/Adopted Maternal Grandfather      Unknown/Adopted Paternal Grandmother      Unknown/Adopted Paternal Grandfather      No Known Problems Daughter      No Known Problems Son      No Known Problems Son          Current Outpatient Medications   Medication Sig Dispense Refill     ACCU-CHEK GUIDE test strip USE 1 TO 2 STRIPS TO CHECK GLUCOSE ONCE DAILY 100 strip 2     alcohol swab prep pads Use to swab area of injection/lakeisha as directed 100 each 3     amLODIPine (NORVASC) 5 MG tablet Take 1 tablet (5 mg) by mouth daily 90 tablet 1     aspirin (ASA) 81 MG EC tablet Take 1 tablet (81 mg) by mouth daily 100 tablet 3     atorvastatin (LIPITOR) 10 MG tablet Take 1 tablet (10 mg) by mouth daily 90 tablet 3     blood glucose calibration (NO BRAND SPECIFIED) solution Use to calibrate blood glucose monitor as needed as directed. To accompany: Blood Glucose Monitor Brands: per insurance. 1 each 3     ibuprofen (ADVIL/MOTRIN) 800 MG tablet Take 1 tablet (800 mg) by mouth every 8 hours as needed for moderate pain 30 tablet 1     losartan-hydrochlorothiazide (HYZAAR) 100-25 MG tablet Take 1 tablet by mouth daily 90 tablet 1     metFORMIN (GLUCOPHAGE XR) 500 MG 24 hr tablet Take 2 tablets by mouth twice daily 360 tablet 1     metoprolol succinate ER (TOPROL XL) 25 MG 24 hr tablet Take 1 tablet (25 mg) by mouth daily 90 tablet 1     potassium chloride ER (KLOR-CON M) 20 MEQ CR tablet Take 2 tablets in am and 1 tablet in pm  "90 tablet 4     sildenafil (VIAGRA) 100 MG tablet Take 1 tablet (100 mg) by mouth daily as needed (sexually activities) 30 tablet 11     thin (NO BRAND SPECIFIED) lancets Use to test blood sugar 1-2 times daily or as directed. To accompany: Blood Glucose Monitor Brands: per insurance. 100 each 3     tiotropium (SPIRIVA RESPIMAT) 2.5 MCG/ACT inhaler INHALE 2 SPRAY(S) BY MOUTH ONCE DAILY 4 g 11     traZODone (DESYREL) 50 MG tablet Take 1-2 tablets ( mg) by mouth At Bedtime 60 tablet 0     No Known Allergies  Recent Labs   Lab Test 05/01/23  0940 10/05/22  1409 06/24/22  1348 05/09/22  1143 02/24/22  0859 01/11/22  1200 09/28/21  1041 09/28/21  1041 07/09/21  1429 05/03/21  1307   A1C 6.7* 6.3*  --  6.5*  --  6.6*   < > 9.0*  --   --    LDL 62 78  --   --   --   --   --   --  81  --    HDL 27* 27*  --   --   --   --   --   --  35*  --    TRIG 318* 230*  --   --   --   --   --   --  182*  --    ALT 29  --   --   --   --  43  --   --  56  --    CR 0.79 0.79   < > 0.87   < > 0.79   < > 0.83 0.95 0.89   GFRESTIMATED >90 >90   < > >90   < > >90   < > >90 >90 >90   GFRESTBLACK  --   --   --   --   --   --   --   --  >90 >90   POTASSIUM 3.5 3.5   < > 3.1*   < > 2.9*   < > 3.5 3.0* 3.4   TSH  --   --   --   --   --   --   --  0.74  --   --     < > = values in this interval not displayed.        Reviewed and updated as needed this visit by clinical staff   Tobacco  Allergies  Meds  Problems  Med Hx  Surg Hx  Fam Hx  Soc   Hx        Reviewed and updated as needed this visit by Provider   Tobacco  Allergies  Meds  Problems  Med Hx  Surg Hx  Fam Hx  Soc   Hx           Review of Systems  Please see subjective otherwise the complete review of system was negative     OBJECTIVE:   /84   Pulse 105   Temp 97.7  F (36.5  C) (Temporal)   Resp 18   Ht 1.882 m (6' 2.1\")   Wt 93 kg (205 lb)   SpO2 97%   BMI 26.25 kg/m      Physical Exam   GENERAL: healthy, alert and no distress, speaking full " sentences.  EYES: Eyes grossly normal to inspection, PERRL and conjunctivae and sclerae normal.  No nystagmus.  All 4 visual fields are intact  HENT: Ear canals and TM's normal.  Nares are non-congested. Oropharynx is pink and moist. No tender with palpation to the sinuses.  NECK: no adenopathy, supple, no lymphadenopathy or thyromegaly.  No tender with palpation to the cervical spine or its paraspinous muscle.  No JV distention or carotid bruits  RESP: lungs clear to auscultation - no rales, rhonchi or wheezes.  Good respiratory effort throughout  CV: regular rate and rhythm, no murmur.  ABDOMEN: soft, nondistended, nontender, no palpable masses or organomegaly with normal bowel sounds.  MS: no gross musculoskeletal defects noted, no edema.  Walk with no limping, normal gait.  All 4 extremities are equally in strength. Ankle, knees, hips, shoulders, elbows and wrists exams normal.  Normal fine motor skills on fingers.  Back is straight, no lordosis or scoliosis.  No tender with palpation to the spine.  SKIN: no suspicious lesions or rashes  NEURO: Normal strength and tone, mentation intact and speech normal.  Cranial nerves II through XII intact, DTR +2 throughout, no focal neurological deficit.  PSYCH: mentation appears normal, affect normal/bright.  Thoughts intact, no hallucination.  No suicidal or homicidal ideation.  LYMPH: no cervical, supraclavicular or axillary adenopathy.   (male): Offered but declined.  Has no concern about it  Foot: Normal fine microfilament exam.  Skin intact, no calluses or open wound.  Normal healthy looking nails.      Diagnostic Test Results:  Labs reviewed in Epic  Results for orders placed or performed in visit on 05/01/23   HEMOGLOBIN A1C     Status: Abnormal   Result Value Ref Range    Hemoglobin A1C 6.7 (H) <5.7 %   Hepatitis B Surface Antibody     Status: None   Result Value Ref Range    Hepatitis B Surface Antibody Instrument Value 0.57 <8.00 m[IU]/mL    Hepatitis B Surface  Antibody Nonreactive    Lipid panel reflex to direct LDL Fasting     Status: Abnormal   Result Value Ref Range    Cholesterol 153 <200 mg/dL    Triglycerides 318 (H) <150 mg/dL    Direct Measure HDL 27 (L) >=40 mg/dL    LDL Cholesterol Calculated 62 <=100 mg/dL    Non HDL Cholesterol 126 <130 mg/dL    Narrative    Cholesterol  Desirable:  <200 mg/dL    Triglycerides  Normal:  Less than 150 mg/dL  Borderline High:  150-199 mg/dL  High:  200-499 mg/dL  Very High:  Greater than or equal to 500 mg/dL    Direct Measure HDL  Female:  Greater than or equal to 50 mg/dL   Male:  Greater than or equal to 40 mg/dL    LDL Cholesterol  Desirable:  <100mg/dL  Above Desirable:  100-129 mg/dL   Borderline High:  130-159 mg/dL   High:  160-189 mg/dL   Very High:  >= 190 mg/dL    Non HDL Cholesterol  Desirable:  130 mg/dL  Above Desirable:  130-159 mg/dL  Borderline High:  160-189 mg/dL  High:  190-219 mg/dL  Very High:  Greater than or equal to 220 mg/dL   Comprehensive metabolic panel (BMP + Alb, Alk Phos, ALT, AST, Total. Bili, TP)     Status: Abnormal   Result Value Ref Range    Sodium 139 136 - 145 mmol/L    Potassium 3.5 3.4 - 5.3 mmol/L    Chloride 100 98 - 107 mmol/L    Carbon Dioxide (CO2) 25 22 - 29 mmol/L    Anion Gap 14 7 - 15 mmol/L    Urea Nitrogen 21.9 (H) 6.0 - 20.0 mg/dL    Creatinine 0.79 0.67 - 1.17 mg/dL    Calcium 10.0 8.6 - 10.0 mg/dL    Glucose 135 (H) 70 - 99 mg/dL    Alkaline Phosphatase 81 40 - 129 U/L    AST 22 10 - 50 U/L    ALT 29 10 - 50 U/L    Protein Total 7.6 6.4 - 8.3 g/dL    Albumin 4.2 3.5 - 5.2 g/dL    Bilirubin Total 0.2 <=1.2 mg/dL    GFR Estimate >90 >60 mL/min/1.73m2   PSA, screen     Status: Normal   Result Value Ref Range    Prostate Specific Antigen Screen 0.65 0.00 - 3.50 ng/mL    Narrative    This result is obtained using the Roche Elecsys total PSA method on the zulema e601 immunoassay analyzer. Results obtained with different assay methods or kits cannot be used interchangeably.        ASSESSMENT/PLAN:   (Z00.00) Routine general medical examination at a health care facility  (primary encounter diagnosis)  Comment: Overall Salvador is doing well -his chronic medical conditions are stable and controlled.  Recommended hep B, COVID, shingle, Pneumovax and influenza vaccination but he declined.  Risk and benefit discussed, he is willing to take the risk.  Discussed about safety issue, healthy diet, exercising, good sleeping hygiene, advanced directive care, falling prevention, and depression prevention. Recommended daily exercise for at least 30 minutes.  Emphasized on healthy diet with adequate fluid intake and resting. Feels safe; lives with his wife.  Not working.  Follow in 1 year for physical, earlier as needed.  Labs as ordered below.      Discussed with him about options for colon cancer screening which include colonoscopy, Cologuard or FIT test.  Pros and cons of each option discussed.  He preferred Cologuard and therefore it was ordered.  He understands that positive Cologuard will need further evaluation with colonoscopy.  He also informed that normal Cologuard is to be done every 3 years .      Discussed with him about the pros and cons of prostate screening cancer with PSA.  Also educated about the potential false positive which may require unnecessary work-up.  He was informed of negative/normal PSA leve does not rule out prostate cancer completely although it is very unlikely.  He understands and is willing to take the risk.       All of his questions were answered.    Plan: Hepatitis B Surface Antibody, Lipid panel         reflex to direct LDL Fasting, Comprehensive         metabolic panel (BMP + Alb, Alk Phos, ALT, AST,        Total. Bili, TP), PSA, screen, CANCELED:         COLOGUARD([employer])            (I10) Hypertension, unspecified type  Comment: BP is normal and stable with the Hyzaar, amlodipine and metoprolol.  Comorbidity include history of stroke, diabetes, and  tobacco smoking.  The goal for his blood pressure to be less than 140/90.  Continue with the current doses of Hyzaar, amlodipine and metoprolol, been tolerating them well.  Healthy/low salt diet, daily excercise and weight management discussed and encouraged.  Lab as ordered -kidney function and electrolytes were normal today.  Follow up in 6 month, earlier as needed.    Plan: losartan-hydrochlorothiazide (HYZAAR) 100-25 MG        tablet, amLODIPine (NORVASC) 5 MG tablet,         metoprolol succinate ER (TOPROL XL) 25 MG 24 hr        tablet            (E11.65) Type 2 diabetes mellitus with hyperglycemia, without long-term current use of insulin (H)  Comment: DM is controlled with today's Hgb A1c of 6.7.  Also has high blood pressure, hyperlipidemia and history of TIA with a long history of heavy tobacco smoking.  The goal for his hemoglobin A1c is to be less than 7.0.  Will continue with the metformin at the current dose.  Continue with aspirin and Lipitor as well.  Encouraged to continue working on healthy diet, exercising and weight management.  He was referred for retinal exam and encouraged him to get it done as soon as possible and annually.  Encourage daily foot care and yearly dental care.  Labs as ordered -kidney functions and electrolytes were normal today.  F/U in 6 months.    Recommended COVID, shingle, pneumococcal and influenza vaccination but he declined.    Plan: Adult Eye  Referral, HEMOGLOBIN A1C,         Albumin Random Urine Quantitative with Creat         Ratio, FOOT EXAM            (Z86.73) History of TIA (transient ischemic attack) and stroke  Comment: Overall doing well, no residual effect.   Blood pressure, diabetes and cholesterol are controlled.  Today's cholesterol showed normal LDL level; but HDL was low and triglyceride level was high.  He is strongly encouraged to stop smoking.  Emphasized on the importance of controlling all modifiable risks.  Continue with the Lipitor and  aspirin - long term treatment.  Encouraged to exercise daily.  Symptoms that need to be seen to call in discussed    Plan: aspirin (ASA) 81 MG EC tablet            (F51.04) Psychophysiological insomnia  Comment: Good sleeping hygiene discussed.  Continue with trazodone as needed which has been effective.    Plan: traZODone (DESYREL) 50 MG tablet            (N52.9) Erectile dysfunction, unspecified erectile dysfunction type  Comment: Discussed with him the potential causes and he was strongly encouraged to stop smoking.  Denied of excessive alcohol intake.  Not taking nitro or medication for prostate.    Viagra has been effective with no side effect.    Will continue with the Viagra as needed.  He was instructed to go to the ER if develops painful erections or erection the last more than 3 to 4 hours.      (F17.200) Tobacco use disorder  Comment: Salvador has been smoking for years -much heavier.  Smokes about  1/2 ppd.  Also has diabetes, TIA, and high blood pressure.  Discussed about the long and short term consequences of tobacco smoking.  Discussed about different options for smoking cessation aids.  He is not ready to quit smoking at this time.  Encourage to let me know when he is ready to quit.  In a mean time, I encourage him to reduce to amount of cigarrets smoke as much as possible.  All of his questions were answered.     Spiriva has been helping with his exertional wheezing and coughing.  Likely that he has has COPD.  Will continue Spiriva.  Also will continue with the albuterol inhaler as needed which he uses rarely.  Again, strongly encouraged to stop smoking.  Symptoms that need to be seen or call in discussed.    Plan: tiotropium (SPIRIVA RESPIMAT) 2.5 MCG/ACT         inhaler, SMOKING CESSATION COUNSELING 3-10 MIN,        Prof fee: Shared Decision Making for Lung         Cancer Screening, CT Chest Lung Cancer Scrn Low        Dose wo            (M19.90) Arthritis  Comment: Continue with Tylenol/Motrin as  "needed for pain.  Normal activity as tolerated.  Follow-up as needed.    Plan: ibuprofen (ADVIL/MOTRIN) 800 MG tablet            (Z28.21) Vaccination refused by patient  Comment: Patient declined all vaccinations today.  Risks and benefits discussed, he was willing take the risks.  He was recommended to get hep B, COVID-19, shingle and Pneumovax.      Patient has been advised of split billing requirements and indicates understanding: Yes      COUNSELING:   Reviewed preventive health counseling, as reflected in patient instructions       Regular exercise       Healthy diet/nutrition       Vision screening       Pneumococcal Vaccine          Immunizations    Declined: Covid-19, Hepatitis B, Pneumococcal and Zoster due to Conscientious objector               Aspirin prophylaxis        Alcohol Use        Consider Hep C screening for all patients one time for ages 18-79 years       Colorectal cancer screening       Prostate cancer screening       Osteoporosis prevention/bone health       Consider lung cancer screening for ages 55-80 years (77 for Medicare) and 20 pack-year smoking history        Advance Care Planning      BMI:   Estimated body mass index is 26.25 kg/m  as calculated from the following:    Height as of this encounter: 1.882 m (6' 2.1\").    Weight as of this encounter: 93 kg (205 lb).   Weight management plan: Discussed healthy diet and exercise guidelines      He reports that he has been smoking cigarettes. He has been smoking an average of .5 packs per day. He has never used smokeless tobacco.  Nicotine/Tobacco Cessation Plan:   Information offered: Patient not interested at this time            Cary Martinez Mai, MD  [facility]  "

## 2023-05-08 ENCOUNTER — HOSPITAL ENCOUNTER (OUTPATIENT)
Dept: CT IMAGING | Facility: CLINIC | Age: 53
Discharge: HOME | End: 2023-05-08
Attending: FAMILY MEDICINE | Admitting: FAMILY MEDICINE
Payer: COMMERCIAL

## 2023-05-08 DIAGNOSIS — F17.200 TOBACCO USE DISORDER: ICD-10-CM

## 2023-05-08 PROCEDURE — 71271 CT THORAX LUNG CANCER SCR C-: CPT

## 2023-05-26 ENCOUNTER — MYC MEDICAL ADVICE (OUTPATIENT)
Dept: FAMILY MEDICINE | Facility: CLINIC | Age: 53
End: 2023-05-26
Payer: COMMERCIAL

## 2023-05-26 ENCOUNTER — NURSE TRIAGE (OUTPATIENT)
Dept: FAMILY MEDICINE | Facility: CLINIC | Age: 53
End: 2023-05-26
Payer: COMMERCIAL

## 2023-05-26 NOTE — TELEPHONE ENCOUNTER
Patient states on his right side he feels like he has a kidney stones.    He has a little pain in his back for 3 days.  No problem urinating.  No pain on urination.  No blood.  He does have nausea, no vomiting.    He had a CT scan on 5/8/23 that shows a renal stone.    Patient would like to know if drinking a lot of water will help him resolve this?    Please advise.    Latanya Estrada RN on 5/26/2023 at 1:54 PM      Reason for Disposition    MODERATE back pain (e.g., interferes with normal activities) and present > 3 days    Additional Information    Negative: Passed out (i.e., fainted, collapsed and was not responding)    Negative: Shock suspected (e.g., cold/pale/clammy skin, too weak to stand, low BP, rapid pulse)    Negative: Sounds like a life-threatening emergency to the triager    Negative: Major injury to the back (e.g., MVA, fall > 10 feet or 3 meters, penetrating injury, etc.)    Negative: Pain in the upper back over the ribs (rib cage) that radiates (travels) into the chest    Negative: Pain in the upper back over the ribs (rib cage) and worsened by coughing (or clearly increases with breathing)    Negative: Back pain during pregnancy    Negative: SEVERE back pain of sudden onset and age > 60 years    Negative: SEVERE abdominal pain (e.g., excruciating)    Negative: Abdominal pain and age > 60 years    Negative: Unable to urinate (or only a few drops) and bladder feels very full    Negative: Loss of bladder or bowel control (urine or bowel incontinence; wetting self, leaking stool) of new-onset    Negative: Numbness (loss of sensation) in groin or rectal area    Negative: Pain radiates into groin, scrotum    Negative: Blood in urine (red, pink, or tea-colored)    Negative: Vomiting and pain over lower ribs of back (i.e., flank - kidney area)    Negative: Weakness of a leg or foot (e.g., unable to bear weight, dragging foot)    Negative: Patient sounds very sick or weak to the triager    Negative: Fever >  100.4 F (38.0 C) and flank pain    Negative: Pain or burning with passing urine (urination)    Negative: SEVERE back pain (e.g., excruciating, unable to do any normal activities) and not improved after pain medicine and CARE ADVICE    Negative: Numbness in an arm or hand (i.e., loss of sensation) and upper back pain    Negative: Numbness in a leg or foot (i.e., loss of sensation)    Negative: High-risk adult (e.g., history of cancer, history of HIV, or history of IV Drug Use)    Negative: Soft tissue infection (e.g., abscess, cellulitis) or other serious infection (e.g., bacteremia) in last 2 weeks    Negative: Painful rash with multiple small blisters grouped together (i.e., dermatomal distribution or 'band' or 'stripe')    Negative: Pain radiates into the thigh or further down the leg, and in both legs    Negative: Age > 50 and no history of prior similar back pain    Protocols used: BACK PAIN-A-[place]

## 2023-05-26 NOTE — TELEPHONE ENCOUNTER
Talked to patient over the phone.  Symptom is mild.  Encouraged to drink a lot of water.  Symptoms that need to go to emergency room over the weekend discussed.  All of his questions were answered.

## 2023-05-30 ENCOUNTER — OFFICE VISIT (OUTPATIENT)
Dept: OPTOMETRY | Facility: CLINIC | Age: 53
End: 2023-05-30
Attending: FAMILY MEDICINE
Payer: COMMERCIAL

## 2023-05-30 DIAGNOSIS — Z01.01 VISION EXAM WITH ABNORMAL FINDINGS: Primary | ICD-10-CM

## 2023-05-30 DIAGNOSIS — H52.13 MYOPIA OF BOTH EYES: ICD-10-CM

## 2023-05-30 DIAGNOSIS — H26.9 CORTICAL CATARACT: ICD-10-CM

## 2023-05-30 DIAGNOSIS — H52.222 REGULAR ASTIGMATISM OF LEFT EYE: ICD-10-CM

## 2023-05-30 DIAGNOSIS — E11.65 TYPE 2 DIABETES MELLITUS WITH HYPERGLYCEMIA, WITHOUT LONG-TERM CURRENT USE OF INSULIN (H): ICD-10-CM

## 2023-05-30 DIAGNOSIS — H52.4 PRESBYOPIA: ICD-10-CM

## 2023-05-30 PROCEDURE — 92004 COMPRE OPH EXAM NEW PT 1/>: CPT | Performed by: OPTOMETRIST

## 2023-05-30 PROCEDURE — 92015 DETERMINE REFRACTIVE STATE: CPT | Performed by: OPTOMETRIST

## 2023-05-30 RX ORDER — METFORMIN HCL 500 MG
TABLET, EXTENDED RELEASE 24 HR ORAL
Qty: 360 TABLET | Refills: 1 | Status: SHIPPED | OUTPATIENT
Start: 2023-05-30 | End: 2023-11-21

## 2023-05-30 ASSESSMENT — VISUAL ACUITY
OS_SC: J1+
OS_PH_SC: 20/30
OD_PH_SC: 20/30
OD_SC: J1+
OD_PH_SC+: -1
OD_SC+: -2
OS_SC: 20/60
METHOD: SNELLEN - LINEAR
OS_SC+: -1
OD_SC: 20/40

## 2023-05-30 ASSESSMENT — REFRACTION_MANIFEST
OD_SPHERE: -2.75
OS_ADD: +2.50
OS_CYLINDER: +0.75
OD_AXIS: 020
OS_SPHERE: -3.00
OD_CYLINDER: +1.25
OS_AXIS: 146
OS_CYLINDER: SPHERE
OD_SPHERE: -2.50
OD_CYLINDER: +1.00
OS_SPHERE: -2.50
OD_ADD: +2.50
OD_AXIS: 002

## 2023-05-30 ASSESSMENT — CONF VISUAL FIELD
OD_SUPERIOR_TEMPORAL_RESTRICTION: 0
METHOD: COUNTING FINGERS
OS_INFERIOR_TEMPORAL_RESTRICTION: 0
OD_INFERIOR_TEMPORAL_RESTRICTION: 0
OS_SUPERIOR_NASAL_RESTRICTION: 0
OS_NORMAL: 1
OS_SUPERIOR_TEMPORAL_RESTRICTION: 0
OD_NORMAL: 1
OS_INFERIOR_NASAL_RESTRICTION: 0
OD_SUPERIOR_NASAL_RESTRICTION: 0
OD_INFERIOR_NASAL_RESTRICTION: 0

## 2023-05-30 ASSESSMENT — KERATOMETRY
OS_K2POWER_DIOPTERS: 45.00
OS_K1POWER_DIOPTERS: 44.00
OD_K2POWER_DIOPTERS: 44.00
OD_AXISANGLE_DEGREES: 114
OS_AXISANGLE_DEGREES: 085
OD_K1POWER_DIOPTERS: 43.50
OD_AXISANGLE2_DEGREES: 024
OS_AXISANGLE2_DEGREES: 175

## 2023-05-30 ASSESSMENT — CUP TO DISC RATIO
OD_RATIO: .3
OS_RATIO: .3

## 2023-05-30 ASSESSMENT — TONOMETRY
IOP_METHOD: APPLANATION
OS_IOP_MMHG: 18
OD_IOP_MMHG: 18

## 2023-05-30 NOTE — TELEPHONE ENCOUNTER
Prescription approved per [facility] Refill Protocol.  Latanya Estrada RN on 5/30/2023 at 9:01 AM

## 2023-05-30 NOTE — LETTER
5/30/2023         RE: Salvador Crespo  [id number]  [place] [address]        Dear Colleague,    Thank you for referring your patient, Salvador Crespo, to the [facility]. Please see a copy of my visit note below.    Chief Complaint   Patient presents with     Diabetic Eye Exam      referred to   Chief Complaint(s) and History of Present Illness(es)     Diabetic Eye Exam                Lab Results   Component Value Date    A1C 6.7 05/01/2023    A1C 6.3 10/05/2022    A1C 6.5 05/09/2022    A1C 6.6 01/11/2022    A1C 9.0 09/28/2021         Last Eye Exam: 1 yr ago  [facility] -  Test for swelling in eye , visual field test? OCT?    Records requested on day of appointment , my chart patient when records arrive about next step in process     Dilated Previously: Yes, side effects of dilation explained today    What are you currently using to see?  does not use glasses or contacts    Distance Vision Acuity: Patient has notice slight decrease in vision,     Near Vision Acuity: Satisfied with vision while reading  unaided    Eye Comfort: good  Do you use eye drops? : No  Occupation or Hobbies: part time work, exercises , walks , puzzles     Jewels Cochran, OA        Medical, surgical and family histories reviewed and updated 5/30/2023.       OBJECTIVE: See Ophthalmology exam    ASSESSMENT:    ICD-10-CM    1. Vision exam with abnormal findings  Z01.01       2. Type 2 diabetes mellitus with hyperglycemia, without long-term current use of insulin (H)  E11.65 Adult Eye  Referral    no diabetic retinopathy found at eye exam       3. Myopia of both eyes  H52.13       4. Regular astigmatism of left eye  H52.222       5. Presbyopia  H52.4       6. Cortical cataract  H26.9           PLAN:    Salvador Crespo aware  eye exam results will be sent to Cary Schaeffer.  Patient Instructions   Fill glasses prescription  Allow 2 weeks to adapt to change in glasses  Return to clinic to verify glasses and for  glasses check as needed.   Keep blood sugar under good control  Return in 1 year for diabetic eye exam  We will send my chart message once records from [facility] have be looked at with more info  .      Blood sugar and blood pressure control are very important in the prevention of ocular complications from diabetes.       Hallie Lloyd, OD  [phone]                        Again, thank you for allowing me to participate in the care of your patient.        Sincerely,        Hallie Lloyd, OD

## 2023-05-30 NOTE — PATIENT INSTRUCTIONS
Fill glasses prescription  Allow 2 weeks to adapt to change in glasses  Return to clinic to verify glasses and for glasses check as needed.   Keep blood sugar under good control  Return in 1 year for diabetic eye exam  We will send my chart message once records from [facility] have be looked at with more info  .      Blood sugar and blood pressure control are very important in the prevention of ocular complications from diabetes.       Hallie Lloyd, OD  [phone]

## 2023-05-30 NOTE — TELEPHONE ENCOUNTER
Patient needs a refill of medication by Thursays.    Please add refills to medication. Patient is out of refills

## 2023-05-30 NOTE — PROGRESS NOTES
Chief Complaint   Patient presents with     Diabetic Eye Exam      referred to   Chief Complaint(s) and History of Present Illness(es)     Diabetic Eye Exam                Lab Results   Component Value Date    A1C 6.7 05/01/2023    A1C 6.3 10/05/2022    A1C 6.5 05/09/2022    A1C 6.6 01/11/2022    A1C 9.0 09/28/2021         Last Eye Exam: 1 yr ago  [facility] -  Test for swelling in eye , visual field test? OCT?    Records requested on day of appointment , my chart patient when records arrive about next step in process     Dilated Previously: Yes, side effects of dilation explained today    What are you currently using to see?  does not use glasses or contacts    Distance Vision Acuity: Patient has notice slight decrease in vision,     Near Vision Acuity: Satisfied with vision while reading  unaided    Eye Comfort: good  Do you use eye drops? : No  Occupation or Hobbies: part time work, exercises , walks , puzzles     Jewels Cochran, OA        Medical, surgical and family histories reviewed and updated 5/30/2023.       OBJECTIVE: See Ophthalmology exam    ASSESSMENT:    ICD-10-CM    1. Vision exam with abnormal findings  Z01.01       2. Type 2 diabetes mellitus with hyperglycemia, without long-term current use of insulin (H)  E11.65 Adult Eye  Referral    no diabetic retinopathy found at eye exam       3. Myopia of both eyes  H52.13       4. Regular astigmatism of left eye  H52.222       5. Presbyopia  H52.4       6. Cortical cataract  H26.9           PLAN:    Salvador Crespo aware  eye exam results will be sent to Cary Schaeffer.  Patient Instructions   Fill glasses prescription  Allow 2 weeks to adapt to change in glasses  Return to clinic to verify glasses and for glasses check as needed.   Keep blood sugar under good control  Return in 1 year for diabetic eye exam  We will send my chart message once records from [facility] have be looked at with more info  .      Blood sugar and blood pressure control are  very important in the prevention of ocular complications from diabetes.       Hallie Lloyd, OD  [phone]

## 2023-06-04 ASSESSMENT — SLIT LAMP EXAM - LIDS
COMMENTS: NORMAL
COMMENTS: NORMAL

## 2023-06-04 ASSESSMENT — EXTERNAL EXAM - LEFT EYE: OS_EXAM: NORMAL

## 2023-06-04 ASSESSMENT — EXTERNAL EXAM - RIGHT EYE: OD_EXAM: NORMAL

## 2023-06-06 ENCOUNTER — TELEPHONE (OUTPATIENT)
Dept: FAMILY MEDICINE | Facility: CLINIC | Age: 53
End: 2023-06-06
Payer: COMMERCIAL

## 2023-07-09 DIAGNOSIS — E11.65 TYPE 2 DIABETES MELLITUS WITH HYPERGLYCEMIA, WITHOUT LONG-TERM CURRENT USE OF INSULIN (H): ICD-10-CM

## 2023-07-11 RX ORDER — BLOOD SUGAR DIAGNOSTIC
STRIP MISCELLANEOUS
Qty: 100 STRIP | Refills: 0 | Status: SHIPPED | OUTPATIENT
Start: 2023-07-11 | End: 2023-11-06

## 2023-08-08 ENCOUNTER — TELEPHONE (OUTPATIENT)
Dept: FAMILY MEDICINE | Facility: CLINIC | Age: 53
End: 2023-08-08
Payer: COMMERCIAL

## 2023-08-08 NOTE — TELEPHONE ENCOUNTER
Order/Referral Request    Who is requesting: patient    Orders being requested: follow up diabetes labs    Reason service is needed/diagnosis: follow up    When are orders needed by: asap    Has this been discussed with Provider: No    Does patient have a preference on a Group/Provider/Facility? na    Does patient have an appointment scheduled?: No    Where to send orders: Place orders within Epic    Could we send this information to you in [employer] or would you prefer to receive a phone call?:   Patient would like to be contacted via [employer]

## 2023-08-08 NOTE — TELEPHONE ENCOUNTER
Please have him come in anytime in the next couple weeks, may use same-day or provider request slot.

## 2023-08-09 NOTE — TELEPHONE ENCOUNTER
Per patient message below he prefers MyChart. MA sent MyChart response.       Sanaz Francisco MA

## 2023-08-11 ENCOUNTER — MYC MEDICAL ADVICE (OUTPATIENT)
Dept: EDUCATION SERVICES | Facility: OTHER | Age: 53
End: 2023-08-11
Payer: COMMERCIAL

## 2023-08-17 ENCOUNTER — LAB (OUTPATIENT)
Dept: FAMILY MEDICINE | Facility: CLINIC | Age: 53
End: 2023-08-17
Payer: COMMERCIAL

## 2023-08-17 DIAGNOSIS — Z12.11 COLON CANCER SCREENING: Primary | ICD-10-CM

## 2023-08-17 DIAGNOSIS — Z12.11 COLON CANCER SCREENING: ICD-10-CM

## 2023-08-17 NOTE — PROGRESS NOTES
Salvador called saying he never got a cologuard in the mail from Oct order.  Dr. Schaeffer then put in an order in March and that was canceled by [facility] (cologuard) because the Oct order was still valid.  I called [facility] and they said we need to cancel both order and do a new one and then they will mail him a new one.  Order is pending for you to sign.

## 2023-09-24 ENCOUNTER — HEALTH MAINTENANCE LETTER (OUTPATIENT)
Age: 53
End: 2023-09-24

## 2023-09-30 DIAGNOSIS — I10 HYPERTENSION, UNSPECIFIED TYPE: ICD-10-CM

## 2023-10-02 RX ORDER — ATORVASTATIN CALCIUM 10 MG/1
10 TABLET, FILM COATED ORAL DAILY
Qty: 90 TABLET | Refills: 0 | Status: SHIPPED | OUTPATIENT
Start: 2023-10-02 | End: 2024-01-02

## 2023-10-17 LAB — NONINV COLON CA DNA+OCC BLD SCRN STL QL: NEGATIVE

## 2023-11-05 DIAGNOSIS — E11.65 TYPE 2 DIABETES MELLITUS WITH HYPERGLYCEMIA, WITHOUT LONG-TERM CURRENT USE OF INSULIN (H): ICD-10-CM

## 2023-11-06 RX ORDER — BLOOD SUGAR DIAGNOSTIC
STRIP MISCELLANEOUS
Qty: 100 STRIP | Refills: 1 | Status: SHIPPED | OUTPATIENT
Start: 2023-11-06

## 2023-11-15 ENCOUNTER — OFFICE VISIT (OUTPATIENT)
Dept: FAMILY MEDICINE | Facility: CLINIC | Age: 53
End: 2023-11-15
Payer: COMMERCIAL

## 2023-11-15 VITALS
HEIGHT: 74 IN | DIASTOLIC BLOOD PRESSURE: 84 MMHG | WEIGHT: 199.4 LBS | SYSTOLIC BLOOD PRESSURE: 136 MMHG | BODY MASS INDEX: 25.59 KG/M2 | HEART RATE: 104 BPM | OXYGEN SATURATION: 99 % | TEMPERATURE: 97.7 F | RESPIRATION RATE: 16 BRPM

## 2023-11-15 DIAGNOSIS — M19.90 ARTHRITIS: ICD-10-CM

## 2023-11-15 DIAGNOSIS — N52.9 ERECTILE DYSFUNCTION, UNSPECIFIED ERECTILE DYSFUNCTION TYPE: ICD-10-CM

## 2023-11-15 DIAGNOSIS — F17.200 TOBACCO USE DISORDER: ICD-10-CM

## 2023-11-15 DIAGNOSIS — I10 HYPERTENSION, UNSPECIFIED TYPE: Primary | ICD-10-CM

## 2023-11-15 DIAGNOSIS — F51.04 PSYCHOPHYSIOLOGICAL INSOMNIA: ICD-10-CM

## 2023-11-15 DIAGNOSIS — E87.6 HYPOKALEMIA: ICD-10-CM

## 2023-11-15 DIAGNOSIS — Z79.4 TYPE 2 DIABETES MELLITUS WITHOUT COMPLICATION, WITH LONG-TERM CURRENT USE OF INSULIN (H): ICD-10-CM

## 2023-11-15 DIAGNOSIS — E11.9 TYPE 2 DIABETES MELLITUS WITHOUT COMPLICATION, WITH LONG-TERM CURRENT USE OF INSULIN (H): ICD-10-CM

## 2023-11-15 LAB
ALBUMIN SERPL BCG-MCNC: 4.5 G/DL (ref 3.5–5.2)
ALP SERPL-CCNC: 72 U/L (ref 40–150)
ALT SERPL W P-5'-P-CCNC: 29 U/L (ref 0–70)
ANION GAP SERPL CALCULATED.3IONS-SCNC: 12 MMOL/L (ref 7–15)
AST SERPL W P-5'-P-CCNC: 22 U/L (ref 0–45)
BILIRUB SERPL-MCNC: 0.4 MG/DL
BUN SERPL-MCNC: 17.9 MG/DL (ref 6–20)
CALCIUM SERPL-MCNC: 9.7 MG/DL (ref 8.6–10)
CHLORIDE SERPL-SCNC: 99 MMOL/L (ref 98–107)
CREAT SERPL-MCNC: 0.83 MG/DL (ref 0.67–1.17)
DEPRECATED HCO3 PLAS-SCNC: 28 MMOL/L (ref 22–29)
EGFRCR SERPLBLD CKD-EPI 2021: >90 ML/MIN/1.73M2
GLUCOSE SERPL-MCNC: 94 MG/DL (ref 70–99)
HBA1C MFR BLD: 6.4 %
POTASSIUM SERPL-SCNC: 3 MMOL/L (ref 3.4–5.3)
PROT SERPL-MCNC: 7.6 G/DL (ref 6.4–8.3)
SODIUM SERPL-SCNC: 139 MMOL/L (ref 135–145)

## 2023-11-15 PROCEDURE — 99214 OFFICE O/P EST MOD 30 MIN: CPT | Performed by: FAMILY MEDICINE

## 2023-11-15 PROCEDURE — 36415 COLL VENOUS BLD VENIPUNCTURE: CPT | Performed by: FAMILY MEDICINE

## 2023-11-15 PROCEDURE — 83036 HEMOGLOBIN GLYCOSYLATED A1C: CPT | Performed by: FAMILY MEDICINE

## 2023-11-15 PROCEDURE — 80053 COMPREHEN METABOLIC PANEL: CPT | Performed by: FAMILY MEDICINE

## 2023-11-15 RX ORDER — TADALAFIL 20 MG/1
20 TABLET ORAL EVERY 24 HOURS
Qty: 10 TABLET | Refills: 1 | Status: SHIPPED | OUTPATIENT
Start: 2023-11-15 | End: 2024-03-27

## 2023-11-15 RX ORDER — ZOLPIDEM TARTRATE 5 MG/1
5-10 TABLET ORAL
Qty: 30 TABLET | Refills: 0 | Status: SHIPPED | OUTPATIENT
Start: 2023-11-15 | End: 2023-11-22

## 2023-11-15 RX ORDER — IBUPROFEN 800 MG/1
800 TABLET, FILM COATED ORAL EVERY 8 HOURS PRN
Qty: 90 TABLET | Refills: 3 | Status: SHIPPED | OUTPATIENT
Start: 2023-11-15

## 2023-11-15 ASSESSMENT — PAIN SCALES - GENERAL: PAINLEVEL: SEVERE PAIN (6)

## 2023-11-15 NOTE — PROGRESS NOTES
Assessment & Plan     (I10) Hypertension, unspecified type  (primary encounter diagnosis)  Comment:  BP is normal and stable with the Hyzaar, amlodipine and metoprolol.  Also has a history of stroke, diabetes, and tobacco smoking and therefore the goal for his blood pressure to be less than 140/90.  Tolerating the medication well.  His kidney function and elect lites were normal except potassium is low.  Healthy/low salt diet, daily excercise and weight management discussed and encouraged.  We will continue with the current medications, follow up in 6 month, earlier as needed.     Potassium level is low today likely due to the Hyzaar.  Will start potassium supplement.    Plan: Comprehensive metabolic panel (BMP + Alb, Alk         Phos, ALT, AST, Total. Bili, TP)            (E11.9,  Z79.4) Type 2 diabetes mellitus without complication, with long-term current use of insulin (H)  Comment: DM is controlled with today's Hgb A1c of 6.4.  Also has high blood pressure, hyperlipidemia and history of TIA with a long history of heavy tobacco smoking.  The goal for his hemoglobin A1c is to be less than 7.0.  Will continue with the metformin at the current dose.  Also will continue with aspirin, Hyzaar and Lipitor.  Encouraged to continue working on healthy diet, exercising and weight management.  UTD for his eye exam.  Encourage regular and daily foot care to monitor for unintended open wound or infection.  Labs as ordered - kidney functions and electrolytes except of the potassium were normal today.  F/U in 6 months.    Recommended COVID, shingle, pneumococcal and influenza vaccination but he declined.     Plan: Albumin Random Urine Quantitative with Creat         Ratio, HEMOGLOBIN A1C, Comprehensive metabolic         panel (BMP + Alb, Alk Phos, ALT, AST, Total.         Bili, TP)            (N52.9) Erectile dysfunction, unspecified erectile dysfunction type  Comment: Discussed with him the potential causes and he was  "strongly encouraged to stop smoking.  No excessive alcohol intake.  Not taking nitro or medication for prostate.  Viagra is no longer as effective despite of taking the max dose.  Never tried Cialis before.  No side effect from the Viagra.  Will have him try the Cialis.  He was instructed to go to the ER if develops painful erections or erection the last more than 3 to 4 hours.  Follow-up as needed.  Plan: tadalafil (ADCIRCA/CIALIS) 20 MG tablet            (M19.90) Arthritis  Comment: Mainly on his hands.  Continue with Tylenol/Motrin as needed for pain.  Normal activity as tolerated.  Follow-up as needed.     Plan: ibuprofen (ADVIL/MOTRIN) 800 MG tablet            (F17.200) Tobacco use disorder  Comment: Been smoking for years -much heavier.  Currently smoking about  1/2 ppd.  Also has diabetes, TIA, and high blood pressure.  Discussed about the long and short term consequences of tobacco smoking.  Discussed about different options for smoking cessation aids.  He is not ready to quit smoking at this time.  Encourage to let me know when he is ready to quit.  In a mean time, I encourage him to reduce to amount of cigarrets smoke as much as possible.  All of his questions were answered.        (F51.04) Psychophysiological insomnia  Comment: Trazodone has not helped.  Has trouble with both falling and maintaining sleep.  Good sleeping hygiene discussed.  Encouraged exercising.  Will have him try the Ambien.  Potential side effect discussed.    Plan: zolpidem (AMBIEN) 5 MG tablet             BMI:   Estimated body mass index is 25.53 kg/m  as calculated from the following:    Height as of this encounter: 1.882 m (6' 2.1\").    Weight as of this encounter: 90.4 kg (199 lb 6.4 oz).   Weight management plan: Discussed healthy diet and exercise guidelines    Work on weight loss  Regular exercise    Cary Martinez Mai, MD  [facility] is a 52 year old, presenting for the following health " "issues:  Diabetes        11/15/2023     2:46 PM   Additional Questions   Roomed by Trupti COLBERT       History of Present Illness       COPD:  He presents for follow up of COPD.  Overall, COPD symptoms are stable since last visit.  He has same as usual fatigue or shortness of breath with exertion and same as usual shortness of breath at rest.  He sometimes coughs and does not have change in sputum. No recent fever. He can walk 1-2 miles without stopping to rest. He can walk 3 or more flights of stairs without resting. The patient has had no ED, urgent care, or hospital admissions because of COPD since the last visit.     Diabetes:   He presents for follow up of diabetes.  He is checking home blood glucose two times daily.   He checks blood glucose after meals.  Blood glucose is never over 200 and never under 70. He is aware of hypoglycemia symptoms including shakiness.    He has no concerns regarding his diabetes at this time.  He is having numbness in feet.            Hypertension: He presents for follow up of hypertension.  He does check blood pressure  regularly outside of the clinic. Outside blood pressures have been over 140/90. He follows a low salt diet.     Headaches:   Since the patient's last clinic visit, headaches are: no change  The patient is getting headaches:  Everyday or 2  He is able to do normal daily activities when he has a migraine.  The patient is taking the following rescue/relief medications:  Ibuprofen (Advil, Motrin)   Patient states \"The relief is inconsistent\" from the rescue/relief medications.   The patient is taking the following medications to prevent migraines:  No medications to prevent migraines  In the past 4 weeks, the patient has gone to an Urgent Care or Emergency Room 0 times times due to headaches.    He eats 2-3 servings of fruits and vegetables daily.He consumes 3 sweetened beverage(s) daily.He exercises with enough effort to increase his heart rate 20 to 29 minutes per day.  " "He exercises with enough effort to increase his heart rate 6 days per week.   He is taking medications regularly.       Salvador was seen today for general follow-up.  Overall he is doing well, been taking the medications as prescribed with no side effects.  He takes Metformin for DM, amlodipine, Hyzaar and metoprolol for HTN and Lipitor for high cholesterol.  Tolerating the medications well.  Not checking BP but his blood sugar has been running around low 100s fasting, no hypoglycemic or hypotensive symptoms.  Been working on eating healthier diet and has been walking 4-5 times a week.  No headache, dizziness, or acute visual change.   Last eye exam was 9 months ago and was told it was normal.  No chest pain or sob.  No numbness or tingling sensation on feet or fingers.  No leg swelling, dyspnea or orthopnea.  No other concern.  Still smokes 1/2 pack a day, been cutting back and used to smoke over 1 ppd.  No headache or dizziness.  No focal weakness or problems with speech. No double vision vision or blurred vision.       Also has ED -has problem with both initiating and maintaining.  Viagra was effective but is no longer as effective despite of taking the max dose.  He had trouble with both initiating and maintaining.  Not on nitro or any medication for prostate.  Would like to try a different medication.  Never been on Cialis before.        Review of Systems   Constitutional, HEENT, cardiovascular, pulmonary, gi and gu systems are negative, except as otherwise noted.      Objective    /84   Pulse 104   Temp 97.7  F (36.5  C) (Temporal)   Resp 16   Ht 1.882 m (6' 2.1\")   Wt 90.4 kg (199 lb 6.4 oz)   SpO2 99%   BMI 25.53 kg/m    Body mass index is 25.53 kg/m .  Physical Exam   GENERAL: healthy, alert and no distress, speaking full sentences  EYES: Eyes grossly normal to inspection, PERRL and conjunctivae and sclerae normal.  No nystagmus.  All 4 visual fields intact.  HENT: ear canals and TM's normal.  " Nares are non-congested. Oropharynx is pink and moist. No tender with palpation to the sinuses.  NECK: Supple, no lymphadenopathy or thyromegaly.  RESP: lungs clear to auscultation - no rales, rhonchi or wheezes  CV: regular rate and rhythm, no murmur, no peripheral edema with strong pedal pulses bilaterally  ABDOMEN: soft, nontender, nondistended, no palpable masses or organomegaly with normal bowel sounds  MS: no gross musculoskeletal defects noted.  No focal weakness.  NEURO: Normal strength and tone, mentation intact and speech normal.  No focal neurological deficit  Diabetic foot exam: Monofilament was normal.  No calluses.  Skin dry and clean, no open wound.  Toenails look good.      Results for orders placed or performed in visit on 11/15/23   Albumin Random Urine Quantitative with Creat Ratio     Status: None   Result Value Ref Range    Creatinine Urine mg/dL 92.9 mg/dL    Albumin Urine mg/L <12.0 mg/L    Albumin Urine mg/g Cr     HEMOGLOBIN A1C     Status: Abnormal   Result Value Ref Range    Hemoglobin A1C 6.4 (H) <5.7 %   Comprehensive metabolic panel (BMP + Alb, Alk Phos, ALT, AST, Total. Bili, TP)     Status: Abnormal   Result Value Ref Range    Sodium 139 135 - 145 mmol/L    Potassium 3.0 (L) 3.4 - 5.3 mmol/L    Carbon Dioxide (CO2) 28 22 - 29 mmol/L    Anion Gap 12 7 - 15 mmol/L    Urea Nitrogen 17.9 6.0 - 20.0 mg/dL    Creatinine 0.83 0.67 - 1.17 mg/dL    GFR Estimate >90 >60 mL/min/1.73m2    Calcium 9.7 8.6 - 10.0 mg/dL    Chloride 99 98 - 107 mmol/L    Glucose 94 70 - 99 mg/dL    Alkaline Phosphatase 72 40 - 150 U/L    AST 22 0 - 45 U/L    ALT 29 0 - 70 U/L    Protein Total 7.6 6.4 - 8.3 g/dL    Albumin 4.5 3.5 - 5.2 g/dL    Bilirubin Total 0.4 <=1.2 mg/dL

## 2023-11-15 NOTE — Clinical Note
Please let patient know that I sent in prescription for potassium chloride to the pharmacy because his potassium level was low.  Recheck the BMP in 3 months.

## 2023-11-16 LAB
CREAT UR-MCNC: 92.9 MG/DL
MICROALBUMIN UR-MCNC: <12 MG/L
MICROALBUMIN/CREAT UR: NORMAL MG/G{CREAT}

## 2023-11-16 PROCEDURE — 82570 ASSAY OF URINE CREATININE: CPT | Performed by: FAMILY MEDICINE

## 2023-11-16 PROCEDURE — 82043 UR ALBUMIN QUANTITATIVE: CPT | Performed by: FAMILY MEDICINE

## 2023-11-19 RX ORDER — POTASSIUM CHLORIDE 1500 MG/1
20 TABLET, EXTENDED RELEASE ORAL DAILY
Qty: 90 TABLET | Refills: 1 | Status: SHIPPED | OUTPATIENT
Start: 2023-11-19 | End: 2024-04-17

## 2023-11-20 ENCOUNTER — TELEPHONE (OUTPATIENT)
Dept: FAMILY MEDICINE | Facility: CLINIC | Age: 53
End: 2023-11-20
Payer: COMMERCIAL

## 2023-11-20 NOTE — TELEPHONE ENCOUNTER
Cary Schaeffer MD  [facility]  Please let patient know that I sent in prescription for potassium chloride to the pharmacy because his potassium level was low.  Recheck the BMP in 3 months.

## 2023-11-21 DIAGNOSIS — E11.65 TYPE 2 DIABETES MELLITUS WITH HYPERGLYCEMIA, WITHOUT LONG-TERM CURRENT USE OF INSULIN (H): ICD-10-CM

## 2023-11-21 DIAGNOSIS — I10 HYPERTENSION, UNSPECIFIED TYPE: ICD-10-CM

## 2023-11-21 RX ORDER — METFORMIN HCL 500 MG
TABLET, EXTENDED RELEASE 24 HR ORAL
Qty: 360 TABLET | Refills: 0 | Status: SHIPPED | OUTPATIENT
Start: 2023-11-21 | End: 2024-02-05

## 2023-11-21 RX ORDER — METOPROLOL SUCCINATE 25 MG/1
25 TABLET, EXTENDED RELEASE ORAL DAILY
Qty: 90 TABLET | Refills: 0 | Status: SHIPPED | OUTPATIENT
Start: 2023-11-21 | End: 2024-03-20

## 2023-11-21 RX ORDER — ATORVASTATIN CALCIUM 10 MG/1
10 TABLET, FILM COATED ORAL DAILY
Qty: 90 TABLET | Refills: 0 | OUTPATIENT
Start: 2023-11-21

## 2023-11-21 RX ORDER — LOSARTAN POTASSIUM AND HYDROCHLOROTHIAZIDE 25; 100 MG/1; MG/1
1 TABLET ORAL DAILY
Qty: 90 TABLET | Refills: 0 | Status: SHIPPED | OUTPATIENT
Start: 2023-11-21 | End: 2024-03-20

## 2023-11-21 RX ORDER — AMLODIPINE BESYLATE 5 MG/1
5 TABLET ORAL DAILY
Qty: 90 TABLET | Refills: 0 | Status: SHIPPED | OUTPATIENT
Start: 2023-11-21 | End: 2024-03-20

## 2023-12-28 DIAGNOSIS — I10 HYPERTENSION, UNSPECIFIED TYPE: ICD-10-CM

## 2023-12-28 DIAGNOSIS — F51.04 PSYCHOPHYSIOLOGICAL INSOMNIA: ICD-10-CM

## 2024-01-02 RX ORDER — ZOLPIDEM TARTRATE 10 MG/1
10 TABLET ORAL
Qty: 30 TABLET | Refills: 0 | Status: SHIPPED | OUTPATIENT
Start: 2024-01-02 | End: 2024-03-20

## 2024-01-02 RX ORDER — ATORVASTATIN CALCIUM 10 MG/1
10 TABLET, FILM COATED ORAL DAILY
Qty: 90 TABLET | Refills: 0 | Status: SHIPPED | OUTPATIENT
Start: 2024-01-02 | End: 2024-03-20

## 2024-01-06 DIAGNOSIS — I10 HYPERTENSION, UNSPECIFIED TYPE: ICD-10-CM

## 2024-01-08 RX ORDER — ATORVASTATIN CALCIUM 10 MG/1
10 TABLET, FILM COATED ORAL DAILY
Qty: 90 TABLET | Refills: 0 | OUTPATIENT
Start: 2024-01-08

## 2024-01-08 RX ORDER — AMLODIPINE BESYLATE 5 MG/1
5 TABLET ORAL DAILY
Qty: 90 TABLET | Refills: 0 | OUTPATIENT
Start: 2024-01-08

## 2024-02-04 DIAGNOSIS — E11.65 TYPE 2 DIABETES MELLITUS WITH HYPERGLYCEMIA, WITHOUT LONG-TERM CURRENT USE OF INSULIN (H): ICD-10-CM

## 2024-02-05 RX ORDER — METFORMIN HCL 500 MG
TABLET, EXTENDED RELEASE 24 HR ORAL
Qty: 360 TABLET | Refills: 1 | Status: SHIPPED | OUTPATIENT
Start: 2024-02-05 | End: 2024-08-31

## 2024-03-20 DIAGNOSIS — E87.6 HYPOKALEMIA: ICD-10-CM

## 2024-03-20 DIAGNOSIS — I10 HYPERTENSION, UNSPECIFIED TYPE: ICD-10-CM

## 2024-03-20 RX ORDER — METOPROLOL SUCCINATE 25 MG/1
25 TABLET, EXTENDED RELEASE ORAL DAILY
Qty: 90 TABLET | Refills: 0 | OUTPATIENT
Start: 2024-03-20

## 2024-03-20 RX ORDER — POTASSIUM CHLORIDE 1500 MG/1
20 TABLET, EXTENDED RELEASE ORAL DAILY
Qty: 90 TABLET | Refills: 0 | OUTPATIENT
Start: 2024-03-20

## 2024-03-20 RX ORDER — LOSARTAN POTASSIUM AND HYDROCHLOROTHIAZIDE 25; 100 MG/1; MG/1
1 TABLET ORAL DAILY
Qty: 90 TABLET | Refills: 0 | OUTPATIENT
Start: 2024-03-20

## 2024-03-20 RX ORDER — AMLODIPINE BESYLATE 5 MG/1
5 TABLET ORAL DAILY
Qty: 90 TABLET | Refills: 0 | OUTPATIENT
Start: 2024-03-20

## 2024-03-27 ENCOUNTER — MYC REFILL (OUTPATIENT)
Dept: FAMILY MEDICINE | Facility: CLINIC | Age: 54
End: 2024-03-27
Payer: COMMERCIAL

## 2024-03-27 DIAGNOSIS — N52.9 ERECTILE DYSFUNCTION, UNSPECIFIED ERECTILE DYSFUNCTION TYPE: ICD-10-CM

## 2024-03-28 RX ORDER — TADALAFIL 20 MG/1
20 TABLET ORAL EVERY 24 HOURS
Qty: 10 TABLET | Refills: 1 | Status: SHIPPED | OUTPATIENT
Start: 2024-03-28 | End: 2024-04-17

## 2024-03-30 ENCOUNTER — MYC REFILL (OUTPATIENT)
Dept: FAMILY MEDICINE | Facility: CLINIC | Age: 54
End: 2024-03-30
Payer: COMMERCIAL

## 2024-03-30 DIAGNOSIS — I10 HYPERTENSION, UNSPECIFIED TYPE: ICD-10-CM

## 2024-04-01 ENCOUNTER — PATIENT OUTREACH (OUTPATIENT)
Dept: CARE COORDINATION | Facility: CLINIC | Age: 54
End: 2024-04-01
Payer: COMMERCIAL

## 2024-04-01 RX ORDER — ATORVASTATIN CALCIUM 10 MG/1
10 TABLET, FILM COATED ORAL DAILY
Qty: 90 TABLET | Refills: 1 | OUTPATIENT
Start: 2024-04-01

## 2024-04-08 ENCOUNTER — TELEPHONE (OUTPATIENT)
Dept: FAMILY MEDICINE | Facility: CLINIC | Age: 54
End: 2024-04-08
Payer: COMMERCIAL

## 2024-04-08 NOTE — TELEPHONE ENCOUNTER
Reason for Call:  Appointment Request    Patient requesting this type of appt: Chronic Diease Management/Medication/Follow-Up    Requested provider: Cary Schaeffer    Reason patient unable to be scheduled: Not within requested timeframe    When does patient want to be seen/preferred time: 1-2 days    Comments: diabetic check and med refills, pt states It has been awhile since he last seen Dr Schaeffer and would like to follow up ASAP, he is hoping to be seen this month if possible.     Could we send this information to you in [employer] or would you prefer to receive a phone call?:   No preference   Okay to leave a detailed message?: Yes at Home number on file [phone] (home)    Call taken on 4/8/2024 at 8:25 AM by Sharonda Sanches

## 2024-04-17 ENCOUNTER — OFFICE VISIT (OUTPATIENT)
Dept: FAMILY MEDICINE | Facility: CLINIC | Age: 54
End: 2024-04-17
Payer: COMMERCIAL

## 2024-04-17 VITALS
RESPIRATION RATE: 12 BRPM | HEART RATE: 100 BPM | SYSTOLIC BLOOD PRESSURE: 138 MMHG | DIASTOLIC BLOOD PRESSURE: 80 MMHG | TEMPERATURE: 97.5 F | BODY MASS INDEX: 25.87 KG/M2 | HEIGHT: 74 IN | OXYGEN SATURATION: 98 % | WEIGHT: 201.6 LBS

## 2024-04-17 DIAGNOSIS — N52.9 ERECTILE DYSFUNCTION, UNSPECIFIED ERECTILE DYSFUNCTION TYPE: ICD-10-CM

## 2024-04-17 DIAGNOSIS — E78.5 HYPERLIPIDEMIA LDL GOAL <70: ICD-10-CM

## 2024-04-17 DIAGNOSIS — E11.9 TYPE 2 DIABETES MELLITUS WITHOUT COMPLICATION, WITHOUT LONG-TERM CURRENT USE OF INSULIN (H): ICD-10-CM

## 2024-04-17 DIAGNOSIS — I10 HYPERTENSION, UNSPECIFIED TYPE: Primary | ICD-10-CM

## 2024-04-17 PROBLEM — E11.65 TYPE 2 DIABETES MELLITUS WITH HYPERGLYCEMIA, WITHOUT LONG-TERM CURRENT USE OF INSULIN (H): Status: ACTIVE | Noted: 2021-10-01

## 2024-04-17 LAB
ALBUMIN SERPL BCG-MCNC: 4.4 G/DL (ref 3.5–5.2)
ALP SERPL-CCNC: 83 U/L (ref 40–150)
ALT SERPL W P-5'-P-CCNC: 25 U/L (ref 0–70)
ANION GAP SERPL CALCULATED.3IONS-SCNC: 12 MMOL/L (ref 7–15)
AST SERPL W P-5'-P-CCNC: 22 U/L (ref 0–45)
BILIRUB SERPL-MCNC: 0.3 MG/DL
BUN SERPL-MCNC: 16.8 MG/DL (ref 6–20)
CALCIUM SERPL-MCNC: 9.9 MG/DL (ref 8.6–10)
CHLORIDE SERPL-SCNC: 101 MMOL/L (ref 98–107)
CHOLEST SERPL-MCNC: 151 MG/DL
CREAT SERPL-MCNC: 0.82 MG/DL (ref 0.67–1.17)
DEPRECATED HCO3 PLAS-SCNC: 29 MMOL/L (ref 22–29)
EGFRCR SERPLBLD CKD-EPI 2021: >90 ML/MIN/1.73M2
FASTING STATUS PATIENT QL REPORTED: NO
GLUCOSE SERPL-MCNC: 107 MG/DL (ref 70–99)
HBA1C MFR BLD: 6.4 %
HDLC SERPL-MCNC: 31 MG/DL
LDLC SERPL CALC-MCNC: 83 MG/DL
NONHDLC SERPL-MCNC: 120 MG/DL
POTASSIUM SERPL-SCNC: 3.4 MMOL/L (ref 3.4–5.3)
PROT SERPL-MCNC: 7.8 G/DL (ref 6.4–8.3)
SODIUM SERPL-SCNC: 142 MMOL/L (ref 135–145)
TRIGL SERPL-MCNC: 183 MG/DL

## 2024-04-17 PROCEDURE — 83036 HEMOGLOBIN GLYCOSYLATED A1C: CPT | Performed by: FAMILY MEDICINE

## 2024-04-17 PROCEDURE — 80053 COMPREHEN METABOLIC PANEL: CPT | Performed by: FAMILY MEDICINE

## 2024-04-17 PROCEDURE — 80061 LIPID PANEL: CPT | Performed by: FAMILY MEDICINE

## 2024-04-17 PROCEDURE — 99214 OFFICE O/P EST MOD 30 MIN: CPT | Performed by: FAMILY MEDICINE

## 2024-04-17 PROCEDURE — 36415 COLL VENOUS BLD VENIPUNCTURE: CPT | Performed by: FAMILY MEDICINE

## 2024-04-17 PROCEDURE — G2211 COMPLEX E/M VISIT ADD ON: HCPCS | Performed by: FAMILY MEDICINE

## 2024-04-17 RX ORDER — TADALAFIL 20 MG/1
20 TABLET ORAL EVERY 24 HOURS
Qty: 15 TABLET | Refills: 11 | Status: SHIPPED | OUTPATIENT
Start: 2024-04-17

## 2024-04-17 ASSESSMENT — PAIN SCALES - GENERAL: PAINLEVEL: NO PAIN (0)

## 2024-04-17 NOTE — Clinical Note
Please let patient know that I am increasing his Lipitor from 10 mg to 20 mg.  He can double up what he is taking.  The new prescription will be 20 mg dose and therefore he should take only 1 tablet daily.  Thank you

## 2024-04-17 NOTE — PROGRESS NOTES
Assessment & Plan     (I10) Hypertension, unspecified type  (primary encounter diagnosis)  Comment:  BP is normal and stable with the Hyzaar, amlodipine and metoprolol.  Also has a history of stroke, diabetes, and tobacco smoking and therefore the goal for his blood pressure to be less than 130/80.  Tolerating the medication well.  His kidney function and electrolytes are normal today.  Healthy/low salt diet, excercise and weight management discussed and encouraged.  We will continue with the current medications, follow up in 6 month, earlier as needed.       (E11.9) Type 2 diabetes mellitus without complication, without long-term current use of insulin (H)  Comment: DM is controlled with today's Hgb A1c of 6.4; unchanged from 6 months ago.  Also has high blood pressure and history of TIA with a long history of heavy tobacco smoking.  The goal for his hemoglobin A1c is to be less than 7.0.  Will continue with the metformin at the current dose -tolerating it well.  Also will continue with aspirin, Hyzaar and Lipitor.  Encouraged to continue working on healthy diet and exercising.  UTD for his eye exam and encouraged to get it done at least annually.  Encourage regular and daily foot care to monitor for unintended open wound or infection.  Diabetic foot exam today was normal.  Labs today showed normal kidney functions and electrolytes.  F/U in 6 months, earlier as needed.  Recommended COVID, shingle, pneumococcal and Hep B vaccinations but he declined.     Plan: HEMOGLOBIN A1C, Lipid panel reflex to direct         LDL Non-fasting, Comprehensive metabolic panel         (BMP + Alb, Alk Phos, ALT, AST, Total. Bili,         TP)            (E78.5) Hyperlipidemia with LDL< 70  Also has diabetes, high blood pressure and history of TIA.  The goal for his LDL is to be less than 70.  Tolerating the Lipitor well.  No history of liver disease and liver enzymes today was normal denies of excessive alcohol intake.  Cholesterol  "level today is slightly subtherapeutic with LDL of 83.  Will increase the Lipitor from 10 mg daily to 20 mg daily.  Potential side effect discussed.  Recheck the cholesterol level in a year.  Exercising, healthy diet and weight management discussed and encouraged.      (N52.9) Erectile dysfunction, unspecified erectile dysfunction type  Comment:  Discussed with him the potential causes and he was strongly encouraged to stop smoking.  No excessive alcohol intake.  Not taking nitro or medication for prostate.  Cialis has been working well, no side effect.  He was instructed to go to the ER if develops painful erections or erection the last more than 3-4 hours.  Please do not take chronic nitro typically.  Follow-up as needed.     Plan: tadalafil (ADCIRCA/CIALIS) 20 MG tablet            Nicotine/Tobacco Cessation  He reports that he has been smoking cigarettes. He has never used smokeless tobacco.  Nicotine/Tobacco Cessation Plan  Information offered: Patient not interested at this time      BMI  Estimated body mass index is 25.88 kg/m  as calculated from the following:    Height as of this encounter: 1.88 m (6' 2\").    Weight as of this encounter: 91.4 kg (201 lb 9.6 oz).   Weight management plan: Discussed healthy diet and exercise guidelines      Work on weight loss  Regular exercise    Olena Franco is a 53 year old, presenting for the following health issues:  RECHECK      4/17/2024     9:52 AM   Additional Questions   Roomed by Catracho Mitchell CMA     History of Present Illness       COPD:  He presents for follow up of COPD.  Overall, COPD symptoms are stable since last visit.  He has same as usual fatigue or shortness of breath with exertion and same as usual shortness of breath at rest.  He sometimes coughs and does not have change in sputum. No recent fever. He can walk less than a mile without stopping to rest. He can walk 3 or more flights of stairs without resting. The patient has had no ED, urgent care, " or hospital admissions because of COPD since the last visit.     Diabetes:   He presents for follow up of diabetes.  He is checking home blood glucose one time daily.   He checks blood glucose after meals and at bedtime.  Blood glucose is never over 200 and never under 70. He is aware of hypoglycemia symptoms including shakiness and weakness.    He has no concerns regarding his diabetes at this time.  He is having numbness in feet, burning in feet and excessive thirst.            Hypertension: He presents for follow up of hypertension.  He does check blood pressure  regularly outside of the clinic. Outside blood pressures have been over 140/90. He follows a low salt diet.     He eats 2-3 servings of fruits and vegetables daily.He consumes 4 sweetened beverage(s) daily.He exercises with enough effort to increase his heart rate 60 or more minutes per day.  He exercises with enough effort to increase his heart rate 7 days per week.   He is taking medications regularly.     Salvador was seen today for general follow-up.  Overall he is doing well, been taking the medications as prescribed with no side effects.  He takes Metformin for DM, amlodipine, Hyzaar and metoprolol for HTN and Lipitor for high cholesterol.  Tolerating the medications well.  Not checking BP but his blood sugar has been running around low 100s fasting, no hypoglycemic or hypotensive symptoms.  Been working on eating healthier diet and has been walking 4-5 times a week.  No headache, dizziness, or acute visual change.  Last eye exam was 3 months ago and was told it was normal.  No chest pain or sob.  No numbness or tingling sensation on feet or fingers.  No leg swelling, dyspnea or orthopnea.  No other concern.  Still smokes 1/2 pack a day, been cutting back and used to smoke over 1 ppd.  Not ready to quit at this time.  No headache or dizziness.  No focal weakness or problems with speech. No double vision vision or blurred vision.  Cialis also been  "working well with no side effect.  Not on nitro or any medication for prostate.          Review of Systems  Constitutional, HEENT, cardiovascular, pulmonary, gi and gu systems are negative, except as otherwise noted.      Objective    /80 (BP Location: Right arm, Patient Position: Chair, Cuff Size: Adult Regular)   Pulse 100   Temp 97.5  F (36.4  C) (Temporal)   Resp 12   Ht 1.88 m (6' 2\")   Wt 91.4 kg (201 lb 9.6 oz)   SpO2 98%   BMI 25.88 kg/m    Body mass index is 25.88 kg/m .  Physical Exam   GENERAL: healthy, alert and no distress, speaking full sentences  EYES: Eyes grossly normal to inspection, PERRL and conjunctivae and sclerae normal.  No nystagmus.  All 4 visual fields intact.  HENT: ear canals and TM's normal.  Nares are non-congested. Oropharynx is pink and moist. No tender with palpation to the sinuses.  NECK: Supple, no lymphadenopathy or thyromegaly.  No JV distention or carotid bruit  RESP: lungs clear to auscultation - no rales, rhonchi or wheezes  CV: regular rate and rhythm, no murmur, no peripheral edema with strong pedal pulses bilaterally  ABDOMEN: soft, nontender, nondistended, no palpable masses or organomegaly with normal bowel sounds  MS: no gross musculoskeletal defects noted.  No focal weakness.  NEURO: Normal strength and tone, mentation intact and speech normal.  No focal neurological deficit  Diabetic foot exam: Monofilament was normal.  No calluses.  Skin dry and clean, no open wound.  Toenails look good.      Results for orders placed or performed in visit on 04/17/24   HEMOGLOBIN A1C     Status: Abnormal   Result Value Ref Range    Hemoglobin A1C 6.4 (H) <5.7 %   Lipid panel reflex to direct LDL Non-fasting     Status: Abnormal   Result Value Ref Range    Cholesterol 151 <200 mg/dL    Triglycerides 183 (H) <150 mg/dL    Direct Measure HDL 31 (L) >=40 mg/dL    LDL Cholesterol Calculated 83 <=100 mg/dL    Non HDL Cholesterol 120 <130 mg/dL    Patient Fasting > 8hrs? No  "    Narrative    Cholesterol  Desirable:  <200 mg/dL    Triglycerides  Normal:  Less than 150 mg/dL  Borderline High:  150-199 mg/dL  High:  200-499 mg/dL  Very High:  Greater than or equal to 500 mg/dL    Direct Measure HDL  Female:  Greater than or equal to 50 mg/dL   Male:  Greater than or equal to 40 mg/dL    LDL Cholesterol  Desirable:  <100mg/dL  Above Desirable:  100-129 mg/dL   Borderline High:  130-159 mg/dL   High:  160-189 mg/dL   Very High:  >= 190 mg/dL    Non HDL Cholesterol  Desirable:  130 mg/dL  Above Desirable:  130-159 mg/dL  Borderline High:  160-189 mg/dL  High:  190-219 mg/dL  Very High:  Greater than or equal to 220 mg/dL   Comprehensive metabolic panel (BMP + Alb, Alk Phos, ALT, AST, Total. Bili, TP)     Status: Abnormal   Result Value Ref Range    Sodium 142 135 - 145 mmol/L    Potassium 3.4 3.4 - 5.3 mmol/L    Carbon Dioxide (CO2) 29 22 - 29 mmol/L    Anion Gap 12 7 - 15 mmol/L    Urea Nitrogen 16.8 6.0 - 20.0 mg/dL    Creatinine 0.82 0.67 - 1.17 mg/dL    GFR Estimate >90 >60 mL/min/1.73m2    Calcium 9.9 8.6 - 10.0 mg/dL    Chloride 101 98 - 107 mmol/L    Glucose 107 (H) 70 - 99 mg/dL    Alkaline Phosphatase 83 40 - 150 U/L    AST 22 0 - 45 U/L    ALT 25 0 - 70 U/L    Protein Total 7.8 6.4 - 8.3 g/dL    Albumin 4.4 3.5 - 5.2 g/dL    Bilirubin Total 0.3 <=1.2 mg/dL           Signed Electronically by: Cary Martinez Mai, MD

## 2024-05-04 PROBLEM — E78.5 HYPERLIPIDEMIA LDL GOAL <70: Status: ACTIVE | Noted: 2024-05-04

## 2024-05-04 PROBLEM — E11.9 TYPE 2 DIABETES MELLITUS WITHOUT COMPLICATION, WITHOUT LONG-TERM CURRENT USE OF INSULIN (H): Status: ACTIVE | Noted: 2021-10-01

## 2024-05-04 RX ORDER — ATORVASTATIN CALCIUM 20 MG/1
20 TABLET, FILM COATED ORAL DAILY
Qty: 90 TABLET | Refills: 1 | Status: SHIPPED | OUTPATIENT
Start: 2024-05-04 | End: 2024-08-31

## 2024-05-06 ENCOUNTER — TELEPHONE (OUTPATIENT)
Dept: FAMILY MEDICINE | Facility: CLINIC | Age: 54
End: 2024-05-06
Payer: COMMERCIAL

## 2024-05-06 NOTE — TELEPHONE ENCOUNTER
Cary Schaeffer MD  P [facility]  Please let patient know that I am increasing his Lipitor from 10 mg to 20 mg.  He can double up what he is taking.  The new prescription will be 20 mg dose and therefore he should take only 1 tablet daily.  Thank you

## 2024-05-08 ENCOUNTER — VIRTUAL VISIT (OUTPATIENT)
Dept: FAMILY MEDICINE | Facility: CLINIC | Age: 54
End: 2024-05-08
Payer: COMMERCIAL

## 2024-05-08 DIAGNOSIS — J44.1 COPD EXACERBATION (H): ICD-10-CM

## 2024-05-08 DIAGNOSIS — F17.200 TOBACCO USE DISORDER: ICD-10-CM

## 2024-05-08 DIAGNOSIS — Z86.73 HISTORY OF TIA (TRANSIENT ISCHEMIC ATTACK) AND STROKE: ICD-10-CM

## 2024-05-08 DIAGNOSIS — J02.9 PHARYNGITIS, UNSPECIFIED ETIOLOGY: Primary | ICD-10-CM

## 2024-05-08 PROCEDURE — 99442 PR PHYSICIAN TELEPHONE EVALUATION 11-20 MIN: CPT | Mod: 93 | Performed by: FAMILY MEDICINE

## 2024-05-08 RX ORDER — HYDROGEN PEROXIDE 2.65 ML/100ML
81 LIQUID ORAL; TOPICAL DAILY
Qty: 90 TABLET | Refills: 1 | Status: SHIPPED | OUTPATIENT
Start: 2024-05-08

## 2024-05-08 RX ORDER — PREDNISONE 20 MG/1
60 TABLET ORAL DAILY
Qty: 15 TABLET | Refills: 0 | Status: SHIPPED | OUTPATIENT
Start: 2024-05-08 | End: 2024-05-13

## 2024-05-08 RX ORDER — BENZONATATE 100 MG/1
100 CAPSULE ORAL 3 TIMES DAILY PRN
Qty: 30 CAPSULE | Refills: 0 | Status: SHIPPED | OUTPATIENT
Start: 2024-05-08 | End: 2024-08-28

## 2024-05-08 RX ORDER — IPRATROPIUM BROMIDE AND ALBUTEROL SULFATE 2.5; .5 MG/3ML; MG/3ML
1 SOLUTION RESPIRATORY (INHALATION) EVERY 6 HOURS PRN
Qty: 30 ML | Refills: 1 | Status: SHIPPED | OUTPATIENT
Start: 2024-05-08

## 2024-05-08 NOTE — PROGRESS NOTES
Salvador is a 53 year old who is being evaluated via a billable telephone visit.    What phone number would you like to be contacted at? [phone]  How would you like to obtain your AVS? Walter  Originating Location (pt. Location): Home    Distant Location (provider location):  On-site    Assessment & Plan       ICD-10-CM    1. Pharyngitis, unspecified etiology  J02.9 amoxicillin-clavulanate (AUGMENTIN) 875-125 MG tablet     benzonatate (TESSALON) 100 MG capsule     ipratropium - albuterol 0.5 mg/2.5 mg/3 mL (DUONEB) 0.5-2.5 (3) MG/3ML neb solution      2. COPD exacerbation (H)  J44.1 predniSONE (DELTASONE) 20 MG tablet     amoxicillin-clavulanate (AUGMENTIN) 875-125 MG tablet     benzonatate (TESSALON) 100 MG capsule     ipratropium - albuterol 0.5 mg/2.5 mg/3 mL (DUONEB) 0.5-2.5 (3) MG/3ML neb solution      3. Tobacco use disorder  F17.200          Clinical presentation and physical exam are more suggestive of acute COPD exacerbation, likely from an upper respiratory infection.  His throat been really sore and raw from the coughing.  He started having the chills and feeling feverish.  Does not sound acutely sick over the phone.  Speaking full sentences.  He was coughing on and off with sounds somewhat congested into his chest.    Discussed with him about the nature condition.  He was strongly encouraged to stop smoking.  She has a neb machine at home, will have him try the DuoNeb every 6 hours as needed for breathing concern.  Continue with the rescue inhaler as needed as well.  He has been using the Spiriva about once a day and will continue with it.    Start him on a 5-day course of prednisone for COPD exacerbation.  Potential side effect specially with hyperglycemic side effect.  Tessalon Perle for coughing.  Also treat empirically for bronchitis/pharyngitis with Augmentin.    Encouraged to drink a lot of water and to take it easy.  Call in or follow-up if the symptoms persist or worsen the next couple days.   "ER immediately if you develop breathing concern.    He felt comfortable with the plan and all of his questions were answered.      18 minutes spent by me on the date of the encounter doing chart review, history and exam, documentation and further activities per the note      Olena Franco is a 53 year old, presenting for the following health issues:  Chest Pain (/) and Throat Pain      5/8/2024    11:26 AM   Additional Questions   Roomed by Trupti COLBERT     History of Present Illness       COPD:  He presents for follow up of COPD.   Overall, COPD symptoms are slightly worse since last visit. He has more than usual fatigue or shortness of breath with exertion and same as usual shortness of breath at rest.  He often coughs and does have change in sputum. No recent fever. He can walk greater than 2 miles without stopping to rest. He can walk 3 or more flights of stairs without resting. The patient has had no ED, urgent care, or hospital admissions because of COPD since the last visit.     Headaches:   Since the patient's last clinic visit, headaches are: worsened  The patient is getting headaches:  Daily  He is able to do normal daily activities when he has a migraine.  The patient is taking the following rescue/relief medications:  Ibuprofen (Advil, Motrin)   Patient states \"The relief is inconsistent\" from the rescue/relief medications.   The patient is taking the following medications to prevent migraines:  No medications to prevent migraines  In the past 4 weeks, the patient has gone to an Urgent Care or Emergency Room 0 times times due to headaches.    Vascular Disease:  He presents for follow up of vascular disease.     He never takes nitroglycerin. He takes daily aspirin.    He eats 2-3 servings of fruits and vegetables daily.He consumes 4 sweetened beverage(s) daily.He exercises with enough effort to increase his heart rate 60 or more minutes per day.  He exercises with enough effort to increase his heart " rate 7 days per week.   He is taking medications regularly.       Salvador was seen today for coughing, chest tightness sensation and wheezing.  He is known to have COPD which has been controlled with Spiriva.  Typically, he uses the rescue inhaler rarely.  Continues to smoke about 1/2 ppd.  In the last 3-4 days, he developed sore throat with a lot of coughing.  The cough is worse at night that wakes him up and keep him up at night.  Cough is congested into his chest with clear to yellow sputum.  Throat feels sore and raw.  Start having the chills and feels feverish today.  Some headache but no sinus pain or pressure.  No runny nose or nasal congestion.  Been using the neb treatment and it has helped.  Eating and drinking well.  No nausea, vomiting, diarrhea or constipation.  No chest pain.        Review of Systems  Constitutional, HEENT, cardiovascular, pulmonary, gi and gu systems are negative, except as otherwise noted.      Objective           Vitals:  No vitals were obtained today due to virtual visit.    Physical Exam   General: Alert and no distress, speaking in full sentences.  He was coughing frequently while on the phone.  Respiratory: No audible wheeze, cough, or or labored breathing   Psychiatric:  Appropriate affect, tone, and pace of words      No results found for any visits on 05/08/24.      Phone call duration: 6 minutes  Started time: 11:50 AM  Ended time: 11:57 AM    Signed Electronically by: Cary Martinez Mai, MD

## 2024-05-13 ENCOUNTER — TELEPHONE (OUTPATIENT)
Dept: FAMILY MEDICINE | Facility: CLINIC | Age: 54
End: 2024-05-13
Payer: COMMERCIAL

## 2024-05-13 NOTE — TELEPHONE ENCOUNTER
Prior Authorization Retail Medication Request    Medication/Dose:albuterol (PROAIR HFA/PROVENTIL HFA/VENTOLIN HFA) 108 (90 Base) MCG/ACT inhaler      Diagnosis and ICD code (if different than what is on RX):     Tobacco use disorder [F17.200]  - Primary     New/renewal/insurance change PA/secondary ins. PA:  Previously Tried and Failed:    Rationale:      Insurance   Primary: [employer] [place]   Insurance ID: [id number]      Secondary (if applicable):  Insurance ID:      Pharmacy Information (if different than what is on RX)  Name:    [facility] [address] [place] [address] - [address]     Phone:  [phone]   Fax: [phone]

## 2024-05-26 NOTE — TELEPHONE ENCOUNTER
PA Initiation    Medication: ALBUTEROL SULFATE  (90 BASE) MCG/ACT IN AERS  Insurance Company: [employer] ([facility]) - Phone [phone] Fax [phone]  Pharmacy Filling the Rx: [facility] [address] [place] [address] - [address]  Filling Pharmacy Phone: [phone]  Filling Pharmacy Fax: [phone]  Start Date: 5/26/2024

## 2024-05-28 NOTE — TELEPHONE ENCOUNTER
Prior Authorization Not Needed per Insurance    Medication: ALBUTEROL SULFATE  (90 BASE) MCG/ACT IN AERS  Insurance Company: [employer] ([facility]) - Phone [phone] Fax [phone]  Expected CoPay: $    Pharmacy Filling the Rx: [id number] - [place], [place] - [address]  Pharmacy Notified: YES  Patient Notified: **Instructed pharmacy to notify patient when script is ready to /ship.**    Per pharmacy they switched it to brand name ventolin and got a paid claim

## 2024-05-30 DIAGNOSIS — I10 HYPERTENSION, UNSPECIFIED TYPE: ICD-10-CM

## 2024-05-30 DIAGNOSIS — F17.200 TOBACCO USE DISORDER: ICD-10-CM

## 2024-05-30 RX ORDER — LOSARTAN POTASSIUM AND HYDROCHLOROTHIAZIDE 25; 100 MG/1; MG/1
1 TABLET ORAL DAILY
Qty: 90 TABLET | Refills: 0 | Status: SHIPPED | OUTPATIENT
Start: 2024-05-30 | End: 2024-08-31

## 2024-05-30 RX ORDER — TIOTROPIUM BROMIDE INHALATION SPRAY 3.12 UG/1
SPRAY, METERED RESPIRATORY (INHALATION)
Qty: 4 G | Refills: 0 | Status: SHIPPED | OUTPATIENT
Start: 2024-05-30 | End: 2024-08-28

## 2024-06-30 ENCOUNTER — HEALTH MAINTENANCE LETTER (OUTPATIENT)
Age: 54
End: 2024-06-30

## 2024-07-31 ENCOUNTER — TELEPHONE (OUTPATIENT)
Dept: FAMILY MEDICINE | Facility: CLINIC | Age: 54
End: 2024-07-31
Payer: COMMERCIAL

## 2024-07-31 NOTE — TELEPHONE ENCOUNTER
Patient Quality Outreach    Patient is due for the following:   Diabetes -  A1C  Physical Preventive Adult Physical    Next Steps:   Patient has upcoming appointment, these items will be addressed at that time.    Type of outreach:    Chart review performed, no outreach needed.      Questions for provider review:    None           Trupti Kaur MA

## 2024-08-28 ENCOUNTER — TELEPHONE (OUTPATIENT)
Dept: FAMILY MEDICINE | Facility: CLINIC | Age: 54
End: 2024-08-28

## 2024-08-28 ENCOUNTER — OFFICE VISIT (OUTPATIENT)
Dept: FAMILY MEDICINE | Facility: CLINIC | Age: 54
End: 2024-08-28
Payer: COMMERCIAL

## 2024-08-28 VITALS
OXYGEN SATURATION: 98 % | HEART RATE: 85 BPM | BODY MASS INDEX: 26.25 KG/M2 | RESPIRATION RATE: 17 BRPM | TEMPERATURE: 97.6 F | DIASTOLIC BLOOD PRESSURE: 78 MMHG | HEIGHT: 72 IN | WEIGHT: 193.8 LBS | SYSTOLIC BLOOD PRESSURE: 128 MMHG

## 2024-08-28 DIAGNOSIS — Z28.21 VACCINATION REFUSED BY PATIENT: ICD-10-CM

## 2024-08-28 DIAGNOSIS — E78.5 HYPERLIPIDEMIA LDL GOAL <70: ICD-10-CM

## 2024-08-28 DIAGNOSIS — N52.9 ERECTILE DYSFUNCTION, UNSPECIFIED ERECTILE DYSFUNCTION TYPE: ICD-10-CM

## 2024-08-28 DIAGNOSIS — Z00.00 ROUTINE GENERAL MEDICAL EXAMINATION AT A HEALTH CARE FACILITY: Primary | ICD-10-CM

## 2024-08-28 DIAGNOSIS — Z86.73 HISTORY OF TIA (TRANSIENT ISCHEMIC ATTACK) AND STROKE: ICD-10-CM

## 2024-08-28 DIAGNOSIS — J43.8 OTHER EMPHYSEMA (H): ICD-10-CM

## 2024-08-28 DIAGNOSIS — F17.200 TOBACCO USE DISORDER: ICD-10-CM

## 2024-08-28 DIAGNOSIS — I10 HYPERTENSION, UNSPECIFIED TYPE: ICD-10-CM

## 2024-08-28 DIAGNOSIS — E11.9 TYPE 2 DIABETES MELLITUS WITHOUT COMPLICATION, WITHOUT LONG-TERM CURRENT USE OF INSULIN (H): ICD-10-CM

## 2024-08-28 PROBLEM — J43.0 UNILATERAL EMPHYSEMA (H): Status: ACTIVE | Noted: 2024-08-28

## 2024-08-28 LAB
ALBUMIN SERPL BCG-MCNC: 4.2 G/DL (ref 3.5–5.2)
ALP SERPL-CCNC: 71 U/L (ref 40–150)
ALT SERPL W P-5'-P-CCNC: 20 U/L (ref 0–70)
ANION GAP SERPL CALCULATED.3IONS-SCNC: 11 MMOL/L (ref 7–15)
AST SERPL W P-5'-P-CCNC: 17 U/L (ref 0–45)
BILIRUB SERPL-MCNC: 0.3 MG/DL
BUN SERPL-MCNC: 20.5 MG/DL (ref 6–20)
CALCIUM SERPL-MCNC: 9.7 MG/DL (ref 8.8–10.4)
CHLORIDE SERPL-SCNC: 103 MMOL/L (ref 98–107)
CREAT SERPL-MCNC: 1.07 MG/DL (ref 0.67–1.17)
EGFRCR SERPLBLD CKD-EPI 2021: 82 ML/MIN/1.73M2
FEF 25/75: NORMAL
FEV-1: NORMAL
FEV1/FVC: NORMAL
FVC: NORMAL
GLUCOSE SERPL-MCNC: 114 MG/DL (ref 70–99)
HBA1C MFR BLD: 6.2 %
HCO3 SERPL-SCNC: 30 MMOL/L (ref 22–29)
POTASSIUM SERPL-SCNC: 3.9 MMOL/L (ref 3.4–5.3)
PROT SERPL-MCNC: 7.2 G/DL (ref 6.4–8.3)
PSA SERPL DL<=0.01 NG/ML-MCNC: 0.71 NG/ML (ref 0–3.5)
SODIUM SERPL-SCNC: 144 MMOL/L (ref 135–145)

## 2024-08-28 PROCEDURE — 99396 PREV VISIT EST AGE 40-64: CPT | Mod: 25 | Performed by: FAMILY MEDICINE

## 2024-08-28 PROCEDURE — 80053 COMPREHEN METABOLIC PANEL: CPT | Performed by: FAMILY MEDICINE

## 2024-08-28 PROCEDURE — 83036 HEMOGLOBIN GLYCOSYLATED A1C: CPT | Performed by: FAMILY MEDICINE

## 2024-08-28 PROCEDURE — 94010 BREATHING CAPACITY TEST: CPT | Performed by: FAMILY MEDICINE

## 2024-08-28 PROCEDURE — 36415 COLL VENOUS BLD VENIPUNCTURE: CPT | Performed by: FAMILY MEDICINE

## 2024-08-28 PROCEDURE — 99214 OFFICE O/P EST MOD 30 MIN: CPT | Mod: 25 | Performed by: FAMILY MEDICINE

## 2024-08-28 PROCEDURE — G0103 PSA SCREENING: HCPCS | Performed by: FAMILY MEDICINE

## 2024-08-28 RX ORDER — TIOTROPIUM BROMIDE INHALATION SPRAY 3.12 UG/1
SPRAY, METERED RESPIRATORY (INHALATION)
Qty: 4 G | Refills: 0 | Status: SHIPPED | OUTPATIENT
Start: 2024-08-28 | End: 2024-08-31

## 2024-08-28 SDOH — HEALTH STABILITY: PHYSICAL HEALTH: ON AVERAGE, HOW MANY MINUTES DO YOU ENGAGE IN EXERCISE AT THIS LEVEL?: 150+ MIN

## 2024-08-28 SDOH — HEALTH STABILITY: PHYSICAL HEALTH: ON AVERAGE, HOW MANY DAYS PER WEEK DO YOU ENGAGE IN MODERATE TO STRENUOUS EXERCISE (LIKE A BRISK WALK)?: 7 DAYS

## 2024-08-28 ASSESSMENT — PAIN SCALES - GENERAL: PAINLEVEL: NO PAIN (0)

## 2024-08-28 ASSESSMENT — SOCIAL DETERMINANTS OF HEALTH (SDOH): HOW OFTEN DO YOU GET TOGETHER WITH FRIENDS OR RELATIVES?: ONCE A WEEK

## 2024-08-28 NOTE — LETTER
My COPD Action Plan     Name: Salvador Crespo    YOB: 1970   Date: 8/31/2024    My doctor: Cary Martinez Mai, MD   My clinic: [address] [address] [phone]  My Controller Medicine: Spiriva   Dose: 1 puff daily     My Rescue Medicine: Albuterol (Proair/Ventolin/Proventil) inhaler   Dose: 2 puffs Q4H prn     My Flare Up Medicine:  None -encouraged to call in        My COPD Severity: Moderate = FeV1 < 79% -50%      Use of Oxygen: Oxygen Not Prescribed      Make sure you've had your pneumonia   vaccines.          GREEN ZONE       Doing well today    Usual level of activity and exercise  Usual amount of cough and mucus  No shortness of breath  Usual level of health (thinking clearly, sleeping well, feel like eating) Actions:    Take daily medicines  Use oxygen as prescribed  Follow regular exercise and diet plan  Avoid cigarette smoke and other irritants that harm the lungs           YELLOW ZONE          Having a bad day or flare up    Short of breath more than usual  A lot more sputum (mucus) than usual  Sputum looks yellow, green, tan, brown or bloody  More coughing or wheezing  Fever or chills  Less energy; trouble completing activities  Trouble thinking or focusing  Using quick relief inhaler or nebulizer more often  Poor sleep; symptoms wake me up  Do not feel like eating Actions:    Get plenty of rest  Take daily medicines  Use quick relief inhaler every 4 hours  If you use oxygen, call you doctor to see if you should adjust your oxygen  Do breathing exercises or other things to help you relax  Let a loved one, friend or neighbor know you are feeling worse  Call your care team if you have 2 or more symptoms.  Start taking steroids or antibiotics if directed by your care team           RED ZONE       Need medical care now    Severe shortness of breath (feel you can't breathe)  Fever, chills  Not enough breath to do any activity  Trouble  coughing up mucus, walking or talking  Blood in mucus  Frequent coughing Rescue medicines are not working  Not able to sleep because of breathing  Feel confused or drowsy  Chest pain    Actions:    Call your health care team.  If you cannot reach your care team, call 911 or go to the emergency room.        Annual Reminders:  Meet with Care Team, Flu Shot every Fall  Pharmacy:    [id number] - [place], [place] - [address] [place] [address] PHARMACY [facility] - [place], [place] - [address] [place] [address]

## 2024-08-28 NOTE — TELEPHONE ENCOUNTER
Patient Quality Outreach    Patient is due for the following:   Diabetes -      Next Steps:   No follow up needed at this time.    Type of outreach:    Chart review performed, no outreach needed.      Questions for provider review:    None           Trupti Kaur MA

## 2024-08-28 NOTE — PATIENT INSTRUCTIONS
Patient Education   Preventive Care Advice   This is general advice given by our system to help you stay healthy. However, your care team may have specific advice just for you. Please talk to your care team about your preventive care needs.  Nutrition  Eat 5 or more servings of fruits and vegetables each day.  Try wheat bread, brown rice and whole grain pasta (instead of white bread, rice, and pasta).  Get enough calcium and vitamin D. Check the label on foods and aim for 100% of the RDA (recommended daily allowance).  Lifestyle  Exercise at least 150 minutes each week  (30 minutes a day, 5 days a week).  Do muscle strengthening activities 2 days a week. These help control your weight and prevent disease.  No smoking.  Wear sunscreen to prevent skin cancer.  Have a dental exam and cleaning every 6 months.  Yearly exams  See your health care team every year to talk about:  Any changes in your health.  Any medicines your care team has prescribed.  Preventive care, family planning, and ways to prevent chronic diseases.  Shots (vaccines)   HPV shots (up to age 26), if you've never had them before.  Hepatitis B shots (up to age 59), if you've never had them before.  COVID-19 shot: Get this shot when it's due.  Flu shot: Get a flu shot every year.  Tetanus shot: Get a tetanus shot every 10 years.  Pneumococcal, hepatitis A, and RSV shots: Ask your care team if you need these based on your risk.  Shingles shot (for age 50 and up)  General health tests  Diabetes screening:  Starting at age 35, Get screened for diabetes at least every 3 years.  If you are younger than age 35, ask your care team if you should be screened for diabetes.  Cholesterol test: At age 39, start having a cholesterol test every 5 years, or more often if advised.  Bone density scan (DEXA): At age 50, ask your care team if you should have this scan for osteoporosis (brittle bones).  Hepatitis C: Get tested at least once in your life.  STIs (sexually  transmitted infections)  Before age 24: Ask your care team if you should be screened for STIs.  After age 24: Get screened for STIs if you're at risk. You are at risk for STIs (including HIV) if:  You are sexually active with more than one person.  You don't use condoms every time.  You or a partner was diagnosed with a sexually transmitted infection.  If you are at risk for HIV, ask about PrEP medicine to prevent HIV.  Get tested for HIV at least once in your life, whether you are at risk for HIV or not.  Cancer screening tests  Cervical cancer screening: If you have a cervix, begin getting regular cervical cancer screening tests starting at age 21.  Breast cancer scan (mammogram): If you've ever had breasts, begin having regular mammograms starting at age 40. This is a scan to check for breast cancer.  Colon cancer screening: It is important to start screening for colon cancer at age 45.  Have a colonoscopy test every 10 years (or more often if you're at risk) Or, ask your provider about stool tests like a FIT test every year or Cologuard test every 3 years.  To learn more about your testing options, visit:   .  For help making a decision, visit:   https://bit.ly/by67136.  Prostate cancer screening test: If you have a prostate, ask your care team if a prostate cancer screening test (PSA) at age 55 is right for you.  Lung cancer screening: If you are a current or former smoker ages 50 to 80, ask your care team if ongoing lung cancer screenings are right for you.  For informational purposes only. Not to replace the advice of your health care provider. Copyright   2023 [facility]. All rights reserved. Clinically reviewed by the [facility]. [employer] [id number] - REV 01/24.  Substance Use Disorder: Care Instructions  Overview     You can improve your life and health by stopping your use of alcohol or drugs. When you don't drink or use drugs, you may feel and sleep better. You may  get along better with your family, friends, and coworkers. There are medicines and programs that can help with substance use disorder.  How can you care for yourself at home?  Here are some ways to help you stay sober and prevent relapse.  If you have been given medicine to help keep you sober or reduce your cravings, be sure to take it exactly as prescribed.  Talk to your doctor about programs that can help you stop using drugs or drinking alcohol.  Do not keep alcohol or drugs in your home.  Plan ahead. Think about what you'll say if other people ask you to drink or use drugs. Try not to spend time with people who drink or use drugs.  Use the time and money spent on drinking or drugs to do something that's important to you.  Preventing a relapse  Have a plan to deal with relapse. Learn to recognize changes in your thinking that lead you to drink or use drugs. Get help before you start to drink or use drugs again.  Try to stay away from situations, friends, or places that may lead you to drink or use drugs.  If you feel the need to drink alcohol or use drugs again, seek help right away. Call a trusted friend or family member. Some people get support from organizations such as Narcotics Anonymous or [facility] or from treatment facilities.  If you relapse, get help as soon as you can. Some people make a plan with another person that outlines what they want that person to do for them if they relapse. The plan usually includes how to handle the relapse and who to notify in case of relapse.  Don't give up. Remember that a relapse doesn't mean that you have failed. Use the experience to learn the triggers that lead you to drink or use drugs. Then quit again. Recovery is a lifelong process. Many people have several relapses before they are able to quit for good.  Follow-up care is a key part of your treatment and safety. Be sure to make and go to all appointments, and call your doctor if you are having problems. It's  "also a good idea to know your test results and keep a list of the medicines you take.  When should you call for help?   Call 911  anytime you think you may need emergency care. For example, call if you or someone else:    Has overdosed or has withdrawal signs. Be sure to tell the emergency workers that you are or someone else is using or trying to quit using drugs. Overdose or withdrawal signs may include:  Losing consciousness.  Seizure.  Seeing or hearing things that aren't there (hallucinations).     Is thinking or talking about suicide or harming others.   Where to get help 24 hours a day, 7 days a week   If you or someone you know talks about suicide, self-harm, a mental health crisis, a substance use crisis, or any other kind of emotional distress, get help right away. You can:    Call the Suicide and Crisis Lifeline at 988.     Call 5-691-175-TALK (1-261.561.2285).     Text HOME to 360815 to access the Crisis Text Line.   Consider saving these numbers in your phone.  Go to [employer] for more information or to chat online.  Call your doctor now or seek immediate medical care if:    You are having withdrawal symptoms. These may include nausea or vomiting, sweating, shakiness, and anxiety.   Watch closely for changes in your health, and be sure to contact your doctor if:    You have a relapse.     You need more help or support to stop.   Where can you learn more?  Go to https://www.[employer].net/patiented  Enter H573 in the search box to learn more about \"Substance Use Disorder: Care Instructions.\"  Current as of: November 15, 2023               Content Version: 14.0    1993-6805 [employer].   Care instructions adapted under license by your healthcare professional. If you have questions about a medical condition or this instruction, always ask your healthcare professional. [employer] disclaims any warranty or liability for your use of this information.       Learning About Risk " for Heart Attack and Stroke (01:56)  Your health professional recommends that you watch this short online health video.  Learn what raises your risk for having a heart attack or stroke and how you can lower your risk.   Purpose: Explains risk factors for heart attack and stroke and ways to lower the risk.  Goal: Users will understand what it means to be at risk for having a heart attack or stroke and what they can do to reduce their risk.    Watch: Scan the QR code or visit the link to view video       https://hwi.se/r/[email or web address]  Current as of: June 24, 2023  Content Version: 14.1 2006-2024 [employer].   Care instructions adapted under license by your healthcare professional. If you have questions about a medical condition or this instruction, always ask your healthcare professional. [employer] disclaims any warranty or liability for your use of this information.    Eating Healthy Foods: Care Instructions  With every meal, you can make healthy food choices. Try to eat a variety of fruits, vegetables, whole grains, lean proteins, and low-fat dairy products. This can help you get the right balance of nutrients, including vitamins and minerals. Small changes add up over time. You can start by adding one healthy food to your meals each day.    Try to make half your plate fruits and vegetables, one-fourth whole grains, and one-fourth lean proteins. Try including dairy with your meals.   Eat more fruits and vegetables. Try to have them with most meals and snacks.   Foods for healthy eating    Fruits    These can be fresh, frozen, canned, or dried.  Try to choose whole fruit rather than fruit juice.  Eat a variety of colors.    Vegetables    These can be fresh, frozen, canned, or dried.  Beans, peas, and lentils count too.    Whole grains    Choose whole-grain breads, cereals, and noodles.  Try brown rice.    Lean proteins    These can include lean meat, poultry, fish, and eggs.  You can  "also have tofu, beans, peas, lentils, nuts, and seeds.    Dairy    Try milk, yogurt, and cheese.  Choose low-fat or fat-free when you can.  If you need to, use lactose-free milk or fortified plant-based milk products, such as soy milk.    Water    Drink water when you're thirsty.  Limit sugar-sweetened drinks, including soda, fruit drinks, and sports drinks.  Where can you learn more?  Go to https://www.[employer].net/patiented  Enter T756 in the search box to learn more about \"Eating Healthy Foods: Care Instructions.\"  Current as of: September 20, 2023               Content Version: 14.0    9957-6195 [employer].   Care instructions adapted under license by your healthcare professional. If you have questions about a medical condition or this instruction, always ask your healthcare professional. [employer] disclaims any warranty or liability for your use of this information.      Body Mass Index: Care Instructions  Overview     Body mass index (BMI) can help you see if your weight is raising your risk for health problems. It uses a formula to compare how much you weigh with how tall you are.  A BMI lower than 18.5 is considered underweight.  A BMI between 18.5 and 24.9 is considered healthy.  A BMI between 25 and 29.9 is considered overweight. A BMI of 30 or higher is considered obese.  If your BMI is in the normal range, it means that you have a lower risk for weight-related health problems. If your BMI is in the overweight or obese range, you may be at increased risk for weight-related health problems, such as high blood pressure, heart disease, stroke, arthritis or joint pain, and diabetes. If your BMI is in the underweight range, you may be at increased risk for health problems such as fatigue, lower protection (immunity) against illness, muscle loss, bone loss, hair loss, and hormone problems.  BMI is just one measure of your risk for weight-related health problems. You may be at " higher risk for health problems if you are not active, you eat an unhealthy diet, or you drink too much alcohol or use tobacco products.  Follow-up care is a key part of your treatment and safety. Be sure to make and go to all appointments, and call your doctor if you are having problems. It's also a good idea to know your test results and keep a list of the medicines you take.  How can you care for yourself at home?  Practice healthy eating habits. This includes eating plenty of fruits, vegetables, whole grains, lean protein, and low-fat dairy.  If your doctor recommends it, get more exercise. Walking is a good choice. Bit by bit, increase the amount you walk every day. Try for at least 30 minutes on most days of the week.  Do not smoke. Smoking can increase your risk for health problems. If you need help quitting, talk to your doctor about stop-smoking programs and medicines. These can increase your chances of quitting for good.  Limit alcohol to 2 drinks a day for men and 1 drink a day for women. Too much alcohol can cause health problems.  If you have a BMI higher than 25  Your doctor may do other tests to check your risk for weight-related health problems. This may include measuring the distance around your waist. A waist measurement of more than 40 inches in men or 35 inches in women can increase the risk of weight-related health problems.  Talk with your doctor about steps you can take to stay healthy or improve your health. You may need to make lifestyle changes to lose weight and stay healthy, such as changing your diet and getting regular exercise.  If you have a BMI lower than 18.5  Your doctor may do other tests to check your risk for health problems.  Talk with your doctor about steps you can take to stay healthy or improve your health. You may need to make lifestyle changes to gain or maintain weight and stay healthy, such as getting more healthy foods in your diet and doing exercises to build  "muscle.  Where can you learn more?  Go to https://www.[employer].net/patiented  Enter S176 in the search box to learn more about \"Body Mass Index: Care Instructions.\"  Current as of: May 13, 2023               Content Version: 14.0    4960-7176 [employer].   Care instructions adapted under license by your healthcare professional. If you have questions about a medical condition or this instruction, always ask your healthcare professional. [employer] disclaims any warranty or liability for your use of this information.      Learning About Being Physically Active  What is physical activity?     Being physically active means doing any kind of activity that gets your body moving.  The types of physical activity that can help you get fit and stay healthy include:  Aerobic or \"cardio\" activities. These make your heart beat faster and make you breathe harder, such as brisk walking, riding a bike, or running. They strengthen your heart and lungs and build up your endurance.  Strength training activities. These make your muscles work against, or \"resist,\" something. Examples include lifting weights or doing push-ups. These activities help tone and strengthen your muscles and bones.  Stretches. These let you move your joints and muscles through their full range of motion. Stretching helps you be more flexible.  Reaching a balance between these three types of physical activity is important because each one contributes to your overall fitness.  What are the benefits of being active?  Being active is one of the best things you can do for your health. It helps you to:  Feel stronger and have more energy to do all the things you like to do.  Focus better at school or work.  Feel, think, and sleep better.  Reach and stay at a healthy weight.  Lose fat and build lean muscle.  Lower your risk for serious health problems, including diabetes, heart attack, high blood pressure, and some cancers.  Keep your " "heart, lungs, bones, muscles, and joints strong and healthy.  How can you make being active part of your life?  Start slowly. Make it your long-term goal to get at least 30 minutes of exercise on most days of the week. Walking is a good choice. You also may want to do other activities, such as running, swimming, cycling, or playing tennis or team sports.  Pick activities that you like--ones that make your heart beat faster, your muscles stronger, and your muscles and joints more flexible. If you find more than one thing you like doing, do them all. You don't have to do the same thing every day.  Get your heart pumping every day. Any activity that makes your heart beat faster and keeps it at that rate for a while counts.  Here are some great ways to get your heart beating faster:  Go for a brisk walk, run, or hike.  Go for a swim or bike ride.  Take an online exercise class or dance.  Play a game of touch football, basketball, or soccer.  Play tennis, pickleball, or racquetball.  Climb stairs.  Even some household chores can be aerobic. Just do them at a faster pace. Raking or mowing the lawn, sweeping the garage, and vacuuming and cleaning your home all can help get your heart rate up.  Strengthen your muscles during the week. You don't have to lift heavy weights or grow big, bulky muscles to get stronger. Doing a few simple activities that make your muscles work against, or \"resist,\" something can help you get stronger. Aim for at least twice a week.  For example, you can:  Do push-ups or sit-ups, which use your own body weight as resistance.  Lift weights or dumbbells or use stretch bands at home or in a gym or community center.  Stretch your muscles often. Stretching will help you as you become more active. It can help you stay flexible and loosen tight muscles. It can also help improve your balance and posture and can be a great way to relax.  Be sure to stretch the muscles you'll be using when you work out. " "It's best to warm your muscles slightly before you stretch them. Walk or do some other light aerobic activity for a few minutes. Then start stretching.  When you stretch your muscles:  Do it slowly. Stretching is not about going fast or making sudden movements.  Don't push or bounce during a stretch.  Hold each stretch for at least 15 to 30 seconds, if you can. You should feel a stretch in the muscle, but not pain.  Breathe out as you do the stretch. Then breathe in as you hold the stretch. Don't hold your breath.  If you're worried about how more activity might affect your health, have a checkup before you start. Follow any special advice your doctor gives you for getting a smart start.  Where can you learn more?  Go to https://www.[employer].net/patiented  Enter W332 in the search box to learn more about \"Learning About Being Physically Active.\"  Current as of: June 5, 2023  Content Version: 14.1    4422-5724 [employer]   Care instructions adapted under license by your healthcare professional. If you have questions about a medical condition or this instruction, always ask your healthcare professional. [employer] disclaims any warranty or liability for your use of this information.       "

## 2024-08-28 NOTE — PROGRESS NOTES
Preventive Care Visit  [facility]  Cary Martinez Mai, MD, Family Medicine  Aug 28, 2024      Assessment & Plan     (Z00.00) Routine general medical examination at a health care facility  (primary encounter diagnosis)  Comment: Overall Salvador is healthy and is doing well - his chronic medical conditions are controlled/stable.  He declined all vaccinations today - including COVID, hep B, shingle, and Pneumovax.  Risks and benefit discussed and he is willing to take the risks discussed,.  Discussed about safety issue, healthy diet, exercising, good sleeping hygiene, advanced directive care, falling prevention, and depression prevention.  Information about advance directive care was given and he was encouraged him to get it updated as soon as possible.  He was recommended to take it easy, especially with positional change prevent falling.  Hallways in the house should always be well lighted and clear of objects. Recommended daily exercise for at least 30 minutes, more tolerated.  Healthy diet with adequate fluid intake and resting discussed and encouraged.  No safety or mental health concerns - lives with wife.  Follow in 1 year for physical, earlier as needed.  Labs as ordered and results reviewed.  All of her questions were answered.     Prostate cancer screening:  Discussed with him about the pros and cons of prostate screening cancer with PSA.  Also educated about the potential false positive which may require unnecessary work-up.  He was informed of negative/normal PSA leve does not rule out prostate cancer completely although it is very unlikely.  He understands and is willing to take the risk.     Colon cancer screening: UTD - last year his Cologuard was negative/normal, his was cleared for 3 years.  He denies of having concerned symptoms.      Skin cancer prevention: Above the waist skin exams showed no concerning lesion. Offered below the waist skin exam today but he declined.  He was  encouraged to be generous with sunblock and sun protective gears when she is outside for a long period of time.  He was also encouraged to monitor and follow-up if noted any skin lesion with changes in size, shape, or color.    Lung cancer screening: Received tobacco smoking below for further details.    Plan: Comprehensive metabolic panel (BMP + Alb, Alk         Phos, ALT, AST, Total. Bili, TP), PSA, screen            (I10) Hypertension, unspecified type  Comment: BP is normal and stable with the Hyzaar, amlodipine and metoprolol.  Has a history of stroke, high cholesterol, diabetes, and tobacco smoking - the goal for his blood pressure to be less than 130/80.  Tolerating the medications well.  His kidney function and electrolytes were normal today.  Healthy/low salt diet, daily excercise and weight management discussed and encouraged.  We will continue with the current medications, follow up in 6 month, earlier as needed.     Plan: amLODIPine (NORVASC) 5 MG tablet, atorvastatin         (LIPITOR) 20 MG tablet,         losartan-hydrochlorothiazide (HYZAAR) 100-25 MG        tablet, metoprolol succinate ER (TOPROL XL) 25         MG 24 hr tablet            (E78.5) Hyperlipidemia LDL goal <70  Comment: With diabetes, high blood pressure and history of TIA.  Been a heavy smoker for many years and is currently smoking half a pack per day.  The goal for his LDL is to be less than 70.  No history of liver disease and denies of excessive alcohol intake.  His liver enzymes 6 months ago was normal.  Cholesterol level 6 months ago was also normal with LDL 83.  Will continue with the Lipitor.  Exercising, healthy diet and weight management discussed and encouraged as above.  Recheck the cholesterol level and liver enzymes in 6 months.      (E11.9) Type 2 diabetes mellitus without complication, without long-term current use of insulin (H)  Comment: Has been controlled, today's Hgb A1c of 6.2,  about the same as it was 6 months  ago.  Also has high blood pressure, hyperlipidemia and history of TIA with a long history of heavy tobacco smoking.  The goal for his hemoglobin A1c is to be less than 7.0.  Will continue with the metformin at the current dose.  Also will continue with aspirin, Hyzaar and Lipitor.  Encouraged to continue working on healthy diet, exercising and weight management.  UTD for his eye exam and encouraged to get it done yearly.  Encourage regular and daily foot care to monitor for unintended open wound or infection.  Labs as ordered - kidney functions and electrolytes were normal today.  F/U in 6 months.    Recommended COVID, shingle, pneumococcal and hep B vaccinations but he declined.  He is willing to take the risk.    Plan: HEMOGLOBIN A1C, metFORMIN (GLUCOPHAGE XR) 500         MG 24 hr tablet            (F17.200) Tobacco use disorder  Comment: Been smoking for years -much heavier up to 2 ppd for years.  Currently smoking about  1/2 ppd - not ready to quit.  Also has COPD, diabetes, TIA, and high blood pressure.  Discussed about the long and short term consequences of tobacco smoking.  Discussed about different options for smoking cessation aids; not ready to quit at this time.  Encourage to let me know when he is ready to quit.  In a mean time, I encourage him to reduce to amount of cigarrets smoke as much as possible.  All of his questions were answered.\\      Discussed about lung cancer screening with low-dose CT scan.  He had 1 done last year with benign looking nodules.  Pros and cons as well as risks and benefit discussed including false positive and false negative.  He is interested and therefore the chest CT scan was ordered.       Plan: CT Chest Lung Cancer Scrn Low Dose wo,         tiotropium (SPIRIVA RESPIMAT) 2.5 MCG/ACT         inhaler, DISCONTINUED: tiotropium (SPIRIVA         RESPIMAT) 2.5 MCG/ACT inhaler            (N52.9) Erectile dysfunction, unspecified erectile dysfunction type  Comment: Discussed  with him the potential causes and he was strongly encouraged to stop smoking.  No excessive alcohol intake.  Not taking nitro or medication for prostate.  Viagra was not helping despite of taking the max dose.  Cialis has helped some at the max dose with no side effect.  No contraindication for Cialis.  Will continue with Cialis at 20 mg daily as needed.  He was instructed to go to the ER if develops painful erections or erection the last more than 3-4 hours.  Follow-up as needed.       (Z86.73) History of TIA (transient ischemic attack) and stroke  Comment: Overall doing well, no residual effect.   Blood pressure, diabetes and cholesterol are controlled.  He is strongly encouraged to stop smoking.  Emphasized on the importance of controlling all modifiable risks.  Continue with the Lipitor and aspirin - long term treatment.  Encouraged to exercise daily.  Symptoms that need to be seen to call in discussed       (J43.8) COPD (H)  Comment: Been a heavy smoker for many years - used to smoke up to 2 pack/day for years and has been smoking about 1/2 ppd in the last couple years.  Tolerating the Spiriva well which has been effective.  Has not feel the need to use the rescue inhaler much since starting the Spiriva.  No coughing or wheezing.    The Spiriva today showed he has moderate restrictive disease, likely COPD.    He was strongly encouraged to stop smoking, he is not ready today.  Will continue with the Spiriva daily as well as the rescue inhaler/neb treatment as needed.  Symptoms that need to be seen or call in discussed.  Instructed to go to the ER if develop breathing concern or difficulty.    COPD action plan printed out for him.    Plan: Spirometry, Breathing Capacity, Normal Order,         Clinic Performed            (Z28.21) Vaccination refused by patient  Comment: Patient declined all vaccinations today.  Risks and benefits discussed, he was willing take the risks.  He was recommended to get hep B, COVID-19,  shingle and Pneumovax.       Patient has been advised of split billing requirements and indicates understanding: Yes        Counseling  Appropriate preventive services were addressed with this patient via screening, questionnaire, or discussion as appropriate for fall prevention, nutrition, physical activity, Tobacco-use cessation, social engagement, weight loss and cognition.  Checklist reviewing preventive services available has been given to the patient.  Reviewed patient's diet, addressing concerns and/or questions.   The patient was instructed to see the dentist every 6 months.   He is at risk for psychosocial distress and has been provided with information to reduce risk.       Regular exercise    Olena Franco is a 53 year old, presenting for the following:  Physical        8/28/2024     9:00 AM   Additional Questions   Roomed by [facility] Care Directive  Patient does not have a Health Care Directive or Living Will: Discussed advance care planning with patient; however, patient declined at this time.    NARGIS Franco is here today for physical and general follow-up.     1.  First is to follow up on DM, HTN and hyperlipidemia.  Overall he is doing well, been taking the medications as prescribed with no side effects.  He takes Metformin for DM, amlodipine, Hyzaar and metoprolol for HTN and Lipitor for high cholesterol.  Tolerating the medications well.  Not checking BP but his blood sugar has been running around 100-150 fasting, no hypoglycemic or hypotensive symptoms.  Been working on eating healthier diet.  Although he does not exercise regularly, he has been walking up to 6-7 miles a day with his job as a  at the local [employer].  No headache, dizziness, or acute visual change.   Last eye exam was 9 months ago and was told it was normal.  No chest pain or sob.  No numbness or tingling sensation on feet or fingers.  No leg swelling, dyspnea or orthopnea.  No other concern.     2.   He has a history of TIA multiple times.    Been taking the Lipitor, aspirin as prescribed.  Blood pressure and diabetes have been controlled.  Still smokes 1/2 pack a day, not ready to quit.  No headache or dizziness.  No focal weakness or problems with speech. No double vision vision or blurred vision.     3.    Cialis works ok for his erectile dysfunction; better than the Viagra.  He had trouble with both initiating and maintaining.  Not on nitro or any medication for prostate.  No side effect from the Cialis.      4.  Otherwise, he is doing well. No major medical care or procedure done since the last physical about a year ago. No N/V/D/C or problem with urination.  No abdominal pain.  Denied of STD risks.  No leg swelling, orthopnea or dyspnea.  No muscle or joint pain.  Walking about 6-7 miles a day as a  for a local [employer]. Social drinker but no drug use.  No safety or mental health concern.  No weight change.  Has chronic insomnia for most of his life and has learned to live with it.  Trazodone did not help.  No other concern today          8/28/2024   General Health   How would you rate your overall physical health? (!) FAIR   Feel stress (tense, anxious, or unable to sleep) Only a little      (!) STRESS CONCERN      8/28/2024   Nutrition   Three or more servings of calcium each day? Yes   Diet: Regular (no restrictions)   How many servings of fruit and vegetables per day? (!) 2-3   How many sweetened beverages each day? (!) 4+            8/28/2024   Exercise   Days per week of moderate/strenous exercise 7 days   Average minutes spent exercising at this level 150+ min            8/28/2024   Social Factors   Frequency of gathering with friends or relatives Once a week   Worry food won't last until get money to buy more No   Food not last or not have enough money for food? No   Do you have housing? (Housing is defined as stable permanent housing and does not include staying ouside in a car, in a  tent, in an abandoned building, in an overnight shelter, or couch-surfing.) Yes   Are you worried about losing your housing? No   Lack of transportation? No   Unable to get utilities (heat,electricity)? No            2024   Fall Risk   Fallen 2 or more times in the past year? No   Trouble with walking or balance? No             2024   Dental   Dentist two times every year? (!) NO            2024   TB Screening   Were you born outside of the [place]? No            Today's PHQ-2 Score:       2024     9:08 AM   PHQ-2 (  [employer])   Q1: Little interest or pleasure in doing things 0   Q2: Feeling down, depressed or hopeless 0   PHQ-2 Score 0   Q1: Little interest or pleasure in doing things Not at all   Q2: Feeling down, depressed or hopeless Not at all   PHQ-2 Score 0           2024   Substance Use   Alcohol more than 3/day or more than 7/wk No   Do you use any other substances recreationally? (!) CANNABIS PRODUCTS        Social History     Tobacco Use    Smoking status: Every Day     Current packs/day: 0.50     Types: Cigarettes    Smokeless tobacco: Never   Vaping Use    Vaping status: Never Used   Substance Use Topics    Alcohol use: Not Currently    Drug use: Yes     Types: Marijuana     Comment: daily - 2021   STI Screening   New sexual partner(s) since last STI/HIV test? No      ASCVD Risk   The ASCVD Risk score (Roma SULTANA, et al., 2019) failed to calculate for the following reasons:    The patient has a prior MI or stroke diagnosis    Fracture Risk Assessment Tool  Link to Frax Calculator  Use the information below to complete the Frax calculator  : 1970  Sex: male  Weight (kg): 87.9 kg (actual weight)  Height (cm): 184 cm  Previous Fragility Fracture:  No  History of parent with fractured hip:  No  Current Smoking:  Yes  Patient has been on glucocorticoids for more than 3 months (5mg/day or more): No  Rheumatoid Arthritis on Problem List:   No  Secondary Osteoporosis on Problem List:  No  Consumes 3 or more units of alcohol per day: No  Femoral Neck BMD (g/cm2)           Reviewed and updated as needed this visit by Provider   Tobacco  Allergies  Meds  Problems  Med Hx  Surg Hx  Fam Hx  Soc   Hx Sexual Activity          Past Medical History:   Diagnosis Date    Cerebral artery occlusion with cerebral infarction (H)     three TIA    Heart disease     enlarged heart    Hypertension      Past Surgical History:   Procedure Laterality Date    NO HISTORY OF SURGERY       BP Readings from Last 3 Encounters:   24 128/78   24 138/80   11/15/23 136/84    Wt Readings from Last 3 Encounters:   24 87.9 kg (193 lb 12.8 oz)   24 91.4 kg (201 lb 9.6 oz)   11/15/23 90.4 kg (199 lb 6.4 oz)                  Patient Active Problem List   Diagnosis    Hypertension, unspecified type    History of TIA (transient ischemic attack) and stroke    Tobacco use disorder    Erectile dysfunction, unspecified erectile dysfunction type    Type 2 diabetes mellitus without complication, without long-term current use of insulin (H)    Vaccination refused by patient    Psychophysiological insomnia    Hyperlipidemia LDL goal <70    Other emphysema (H)     Past Surgical History:   Procedure Laterality Date    NO HISTORY OF SURGERY         Social History     Tobacco Use    Smoking status: Every Day     Current packs/day: 0.50     Types: Cigarettes    Smokeless tobacco: Never   Substance Use Topics    Alcohol use: Not Currently     Family History   Problem Relation Age of Onset    Unknown/Adopted Mother     Unknown/Adopted Father     Unknown/Adopted Maternal Grandmother     Unknown/Adopted Maternal Grandfather     Unknown/Adopted Paternal Grandmother     Unknown/Adopted Paternal Grandfather     Aneurysm Brother          at 16    No Known Problems Sister     No Known Problems Son     No Known Problems Son     No Known Problems Daughter     Glaucoma No family  hx of     Macular Degeneration No family hx of          Current Outpatient Medications   Medication Sig Dispense Refill    albuterol (PROAIR HFA/PROVENTIL HFA/VENTOLIN HFA) 108 (90 Base) MCG/ACT inhaler Inhale 2 puffs into the lungs every 4 hours as needed for shortness of breath, wheezing or cough 18 g 3    alcohol swab prep pads Use to swab area of injection/lakeisha as directed 100 each 3    amLODIPine (NORVASC) 5 MG tablet Take 1 tablet (5 mg) by mouth daily. 90 tablet 2    aspirin (EQ ASPIRIN ADULT LOW DOSE) 81 MG EC tablet Take 1 tablet by mouth once daily 90 tablet 1    atorvastatin (LIPITOR) 20 MG tablet Take 1 tablet (20 mg) by mouth daily. 90 tablet 2    blood glucose (ACCU-CHEK GUIDE) test strip USE 1 TO 2 STRIPS TO CHECK GLUCOSE ONCE DAILY 100 strip 1    blood glucose calibration (NO BRAND SPECIFIED) solution Use to calibrate blood glucose monitor as needed as directed. To accompany: Blood Glucose Monitor Brands: per insurance. 1 each 3    ibuprofen (ADVIL/MOTRIN) 800 MG tablet Take 1 tablet (800 mg) by mouth every 8 hours as needed for moderate pain 90 tablet 3    ipratropium - albuterol 0.5 mg/2.5 mg/3 mL (DUONEB) 0.5-2.5 (3) MG/3ML neb solution Take 1 vial (3 mLs) by nebulization every 6 hours as needed for shortness of breath, wheezing or cough 30 mL 1    losartan-hydrochlorothiazide (HYZAAR) 100-25 MG tablet Take 1 tablet by mouth daily. 90 tablet 1    metFORMIN (GLUCOPHAGE XR) 500 MG 24 hr tablet Take 2 tablets (1,000 mg) by mouth 2 times daily (with meals). 360 tablet 2    metoprolol succinate ER (TOPROL XL) 25 MG 24 hr tablet Take 1 tablet (25 mg) by mouth daily. 90 tablet 2    tadalafil (ADCIRCA/CIALIS) 20 MG tablet Take 1 tablet (20 mg) by mouth every 24 hours 15 tablet 11    thin (NO BRAND SPECIFIED) lancets Use to test blood sugar 1-2 times daily or as directed. To accompany: Blood Glucose Monitor Brands: per insurance. 100 each 3    tiotropium (SPIRIVA RESPIMAT) 2.5 MCG/ACT inhaler INHALE 2  "SPRAY(S) BY MOUTH ONCE DAILY 4 g 11     No Known Allergies  Recent Labs   Lab Test 08/28/24  1041 04/17/24  1108 11/15/23  1618 05/01/23  0940 10/05/22  1409 10/05/22  1409 01/11/22  1200 09/28/21  1041 07/09/21  1429 05/03/21  1307   A1C 6.2* 6.4* 6.4* 6.7*  --  6.3*   < > 9.0*  --   --    LDL  --  83  --  62  --  78  --   --  81  --    HDL  --  31*  --  27*  --  27*  --   --  35*  --    TRIG  --  183*  --  318*  --  230*  --   --  182*  --    ALT 20 25 29 29  --   --    < >  --  56  --    CR 1.07 0.82 0.83 0.79  --  0.79   < > 0.83 0.95 0.89   GFRESTIMATED 82 >90 >90 >90  --  >90   < > >90 >90 >90   GFRESTBLACK  --   --   --   --   --   --   --   --  >90 >90   POTASSIUM 3.9 3.4 3.0* 3.5   < > 3.5   < > 3.5 3.0* 3.4   TSH  --   --   --   --   --   --   --  0.74  --   --     < > = values in this interval not displayed.          Review of Systems  Constitutional, HEENT, cardiovascular, pulmonary, GI, , musculoskeletal, neuro, skin, endocrine and psych systems are negative, except as otherwise noted.     Objective    Exam  BP (!) 146/90   Pulse 85   Temp 97.6  F (36.4  C) (Temporal)   Resp 17   Ht 1.84 m (6' 0.44\")   Wt 87.9 kg (193 lb 12.8 oz)   SpO2 98%   BMI 25.96 kg/m     Estimated body mass index is 25.96 kg/m  as calculated from the following:    Height as of this encounter: 1.84 m (6' 0.44\").    Weight as of this encounter: 87.9 kg (193 lb 12.8 oz).    Physical Exam  GENERAL: healthy, alert and no distress, speaking full sentences.  EYES: Eyes grossly normal to inspection, PERRL and conjunctivae and sclerae normal.  No nystagmus.  All 4 visual fields are intact  HENT: Ear canals and TM's normal.  Nares are non-congested. Oropharynx is pink and moist. No tender with palpation to the sinuses.  NECK: no adenopathy, supple, no lymphadenopathy or thyromegaly.  No tender with palpation to the cervical spine or its paraspinous muscle.  No JV distention or carotid bruits  RESP: lungs clear to auscultation - no " rales, rhonchi or wheezes.  Good respiratory effort throughout  CV: regular rate and rhythm, no murmur.  ABDOMEN: soft, nondistended, nontender, no palpable masses or organomegaly with normal bowel sounds.  MS: no gross musculoskeletal defects noted, no edema.  Walk with no limping, normal gait.  All 4 extremities are equally in strength. Ankle, knees, hips, shoulders, elbows and wrists exams normal.  Normal fine motor skills on fingers.  Back is straight, no lordosis or scoliosis.  No tender with palpation to the spine.  SKIN: Above the waist the skin exam showed no suspicious lesions or rashes.  Offered and recommended below the waist skin exam but he declined.    NEURO: Normal strength and tone, mentation intact and speech normal.  Cranial nerves II through XII intact, DTR +2 throughout, no focal neurological deficit.  PSYCH: mentation appears normal, affect normal/bright.  Thoughts intact, no hallucination.  No suicidal or homicidal ideation.  LYMPH: no cervical, supraclavicular or axillary adenopathy.   (male): Offered but declined.  Has no concern about it  Foot: Normal fine microfilament exam.  Skin intact, no calluses or open wound.  Normal healthy looking nails.        Results for orders placed or performed in visit on 08/28/24   Spirometry, Breathing Capacity, Normal Order, Clinic Performed     Status: None (Preliminary result)   Result Value Ref Range    FEV-1      FVC      FEV1/FVC      FEF 25/75     PSA, screen     Status: Normal   Result Value Ref Range    Prostate Specific Antigen Screen 0.71 0.00 - 3.50 ng/mL    Narrative    This result is obtained using the Roche Elecsys total PSA method on the zulema [id number] immunoassay analyzer. Results obtained with different assay methods or kits cannot be used interchangeably.   Comprehensive metabolic panel (BMP + Alb, Alk Phos, ALT, AST, Total. Bili, TP)     Status: Abnormal   Result Value Ref Range    Sodium 144 135 - 145 mmol/L    Potassium 3.9 3.4 - 5.3  mmol/L    Carbon Dioxide (CO2) 30 (H) 22 - 29 mmol/L    Anion Gap 11 7 - 15 mmol/L    Urea Nitrogen 20.5 (H) 6.0 - 20.0 mg/dL    Creatinine 1.07 0.67 - 1.17 mg/dL    GFR Estimate 82 >60 mL/min/1.73m2    Calcium 9.7 8.8 - 10.4 mg/dL    Chloride 103 98 - 107 mmol/L    Glucose 114 (H) 70 - 99 mg/dL    Alkaline Phosphatase 71 40 - 150 U/L    AST 17 0 - 45 U/L    ALT 20 0 - 70 U/L    Protein Total 7.2 6.4 - 8.3 g/dL    Albumin 4.2 3.5 - 5.2 g/dL    Bilirubin Total 0.3 <=1.2 mg/dL   HEMOGLOBIN A1C     Status: Abnormal   Result Value Ref Range    Hemoglobin A1C 6.2 (H) <5.7 %          Signed Electronically by: Cary Martinez Mai, MD

## 2024-08-31 PROBLEM — J43.8 OTHER EMPHYSEMA (H): Status: ACTIVE | Noted: 2024-08-28

## 2024-08-31 RX ORDER — AMLODIPINE BESYLATE 5 MG/1
5 TABLET ORAL DAILY
Qty: 90 TABLET | Refills: 2 | Status: SHIPPED | OUTPATIENT
Start: 2024-08-31

## 2024-08-31 RX ORDER — ATORVASTATIN CALCIUM 20 MG/1
20 TABLET, FILM COATED ORAL DAILY
Qty: 90 TABLET | Refills: 2 | Status: SHIPPED | OUTPATIENT
Start: 2024-08-31

## 2024-08-31 RX ORDER — LOSARTAN POTASSIUM AND HYDROCHLOROTHIAZIDE 25; 100 MG/1; MG/1
1 TABLET ORAL DAILY
Qty: 90 TABLET | Refills: 1 | Status: SHIPPED | OUTPATIENT
Start: 2024-08-31

## 2024-08-31 RX ORDER — TIOTROPIUM BROMIDE INHALATION SPRAY 3.12 UG/1
SPRAY, METERED RESPIRATORY (INHALATION)
Qty: 4 G | Refills: 11 | Status: SHIPPED | OUTPATIENT
Start: 2024-08-31

## 2024-08-31 RX ORDER — METOPROLOL SUCCINATE 25 MG/1
25 TABLET, EXTENDED RELEASE ORAL DAILY
Qty: 90 TABLET | Refills: 2 | Status: SHIPPED | OUTPATIENT
Start: 2024-08-31

## 2024-08-31 RX ORDER — METFORMIN HCL 500 MG
1000 TABLET, EXTENDED RELEASE 24 HR ORAL 2 TIMES DAILY WITH MEALS
Qty: 360 TABLET | Refills: 2 | Status: SHIPPED | OUTPATIENT
Start: 2024-08-31

## 2024-09-09 ENCOUNTER — HOSPITAL ENCOUNTER (OUTPATIENT)
Dept: CT IMAGING | Facility: CLINIC | Age: 54
Discharge: HOME | End: 2024-09-09
Attending: FAMILY MEDICINE | Admitting: FAMILY MEDICINE
Payer: COMMERCIAL

## 2024-09-09 DIAGNOSIS — F17.200 TOBACCO USE DISORDER: ICD-10-CM

## 2024-09-09 PROCEDURE — 71271 CT THORAX LUNG CANCER SCR C-: CPT

## 2024-09-17 ENCOUNTER — TELEPHONE (OUTPATIENT)
Dept: NURSING | Facility: CLINIC | Age: 54
End: 2024-09-17
Payer: COMMERCIAL

## 2024-09-17 DIAGNOSIS — R91.8 ABNORMAL CT LUNG SCREENING: ICD-10-CM

## 2024-09-17 NOTE — TELEPHONE ENCOUNTER
[facility] Radiology Lung Cancer Screening CT result notification:     LDCT/Lung Cancer Screening CT Exam date: 9/9/24  Radiologist Impression AND Recommendations:   IMPRESSION:   1. ACR Assessment Category:  Lung-RADS Category 3. Probably benign  finding(s)- short term follow up suggested with 6 month low dose CT  (please order exam code IMG 2163). There is a new groundglass nodule  noted at the right upper lobe.   Pertinent Findings:  Lungs: Emphysema. No lobar consolidation or effusion. Calcified  granuloma at the right middle lobe appears stable. New patchy  groundglass opacity measuring 11 mm posterior right upper lobe, series  3 image 129.     Ordering Provider: Cary Schaeffer MD Scott P Swan did receive the remaining radiology results from their PCP.        Lung Nodule Program Protocol recommendation [Pertaining to lung nodules]:    Lung RADS 3 Protocol: Results RN to notify Patient of results/recommendations and place order for 6 month CT ([id number]) - MD duff  CT Chest order ([id number]) placed to be completed within 6 months (Yes/No):  Yes due on or after 3/17/25.  Image scheduling will contact Patient 1 month prior to due date.    RN communicated the lung nodule finding to the patient (Yes/No):  At 4:18P, Left voicemail message requesting a call back to [facility] ED Lab Result RN at [phone]. RN is available every day between 9 a.m. and 5:30 p.m.   The patient had the following questions: NA  Correct letter sent as per [facility] Lung nodule protocol (Yes/No):  yes  Did Patient have any CT's of chest previous? (Yes/No/NA) yes, 5/8/24  If no comparisons used, inquire if patient has had any chest CT's in the past and if so, request priors.    If scheduling LDCT only, RN will contact Image Scheduling Team  Hours available (all sites below):  Mon-Fri 7A to 7P; Sat 7A to 3:30P.  No schedulers available on Sunday.  [facility] ([facility], [place], and [place]):  [phone]  [place] ([place] and [place]): [phone]  [place] region ([place], [facility], and [place]): [phone]    Jared Rico RN  [facility]  Lung Nodule and Emergency Dept Lab Result RN  Ph# [phone]

## 2024-09-19 NOTE — TELEPHONE ENCOUNTER
Patient returning call.     Patient states he has already been in contact with his PCP and is aware of these results and recommendations. He has no further questions.     Cruz Man RN  [facility]  Emergency Dept Lab Result RN  Ph# [phone]

## 2025-01-05 ENCOUNTER — HEALTH MAINTENANCE LETTER (OUTPATIENT)
Age: 55
End: 2025-01-05

## 2025-01-27 ENCOUNTER — NURSE TRIAGE (OUTPATIENT)
Dept: FAMILY MEDICINE | Facility: CLINIC | Age: 55
End: 2025-01-27
Payer: COMMERCIAL

## 2025-01-31 ENCOUNTER — OFFICE VISIT (OUTPATIENT)
Dept: FAMILY MEDICINE | Facility: CLINIC | Age: 55
End: 2025-01-31
Payer: COMMERCIAL

## 2025-01-31 VITALS
RESPIRATION RATE: 12 BRPM | SYSTOLIC BLOOD PRESSURE: 120 MMHG | BODY MASS INDEX: 25.18 KG/M2 | OXYGEN SATURATION: 98 % | HEART RATE: 88 BPM | TEMPERATURE: 97.9 F | DIASTOLIC BLOOD PRESSURE: 78 MMHG | WEIGHT: 196.2 LBS | HEIGHT: 74 IN

## 2025-01-31 DIAGNOSIS — I10 HYPERTENSION, UNSPECIFIED TYPE: Primary | ICD-10-CM

## 2025-01-31 DIAGNOSIS — Z86.73 HISTORY OF TIA (TRANSIENT ISCHEMIC ATTACK) AND STROKE: ICD-10-CM

## 2025-01-31 DIAGNOSIS — F17.200 TOBACCO USE DISORDER: ICD-10-CM

## 2025-01-31 DIAGNOSIS — M72.2 PLANTAR FASCIITIS: ICD-10-CM

## 2025-01-31 DIAGNOSIS — R05.3 CHRONIC COUGH: ICD-10-CM

## 2025-01-31 DIAGNOSIS — E11.9 TYPE 2 DIABETES MELLITUS WITHOUT COMPLICATION, WITHOUT LONG-TERM CURRENT USE OF INSULIN (H): ICD-10-CM

## 2025-01-31 DIAGNOSIS — R07.9 CHEST PAIN, UNSPECIFIED TYPE: ICD-10-CM

## 2025-01-31 DIAGNOSIS — I10 HYPERTENSION, UNSPECIFIED TYPE: ICD-10-CM

## 2025-01-31 DIAGNOSIS — E11.9 TYPE 2 DIABETES MELLITUS WITHOUT COMPLICATION, WITHOUT LONG-TERM CURRENT USE OF INSULIN (H): Primary | ICD-10-CM

## 2025-01-31 PROBLEM — J43.8 OTHER EMPHYSEMA (H): Status: RESOLVED | Noted: 2024-08-28 | Resolved: 2025-01-31

## 2025-01-31 LAB
ALBUMIN SERPL BCG-MCNC: 4.4 G/DL (ref 3.5–5.2)
ALP SERPL-CCNC: 75 U/L (ref 40–150)
ALT SERPL W P-5'-P-CCNC: 16 U/L (ref 0–70)
ANION GAP SERPL CALCULATED.3IONS-SCNC: 14 MMOL/L (ref 7–15)
AST SERPL W P-5'-P-CCNC: 15 U/L (ref 0–45)
BASOPHILS # BLD AUTO: 0.1 10E3/UL (ref 0–0.2)
BASOPHILS NFR BLD AUTO: 1 %
BILIRUB SERPL-MCNC: 0.4 MG/DL
BUN SERPL-MCNC: 23.6 MG/DL (ref 6–20)
CALCIUM SERPL-MCNC: 9.5 MG/DL (ref 8.8–10.4)
CHLORIDE SERPL-SCNC: 103 MMOL/L (ref 98–107)
CREAT SERPL-MCNC: 0.81 MG/DL (ref 0.67–1.17)
EGFRCR SERPLBLD CKD-EPI 2021: >90 ML/MIN/1.73M2
EOSINOPHIL # BLD AUTO: 0.3 10E3/UL (ref 0–0.7)
EOSINOPHIL NFR BLD AUTO: 4 %
ERYTHROCYTE [DISTWIDTH] IN BLOOD BY AUTOMATED COUNT: 13.2 % (ref 10–15)
EST. AVERAGE GLUCOSE BLD GHB EST-MCNC: 137 MG/DL
GLUCOSE SERPL-MCNC: 120 MG/DL (ref 70–99)
HBA1C MFR BLD: 6.4 %
HCO3 SERPL-SCNC: 26 MMOL/L (ref 22–29)
HCT VFR BLD AUTO: 47.9 % (ref 40–53)
HGB BLD-MCNC: 16.8 G/DL (ref 13.3–17.7)
IMM GRANULOCYTES # BLD: 0 10E3/UL
IMM GRANULOCYTES NFR BLD: 0 %
LYMPHOCYTES # BLD AUTO: 2.6 10E3/UL (ref 0.8–5.3)
LYMPHOCYTES NFR BLD AUTO: 33 %
MCH RBC QN AUTO: 30.7 PG (ref 26.5–33)
MCHC RBC AUTO-ENTMCNC: 35.1 G/DL (ref 31.5–36.5)
MCV RBC AUTO: 87 FL (ref 78–100)
MONOCYTES # BLD AUTO: 0.4 10E3/UL (ref 0–1.3)
MONOCYTES NFR BLD AUTO: 6 %
NEUTROPHILS # BLD AUTO: 4.5 10E3/UL (ref 1.6–8.3)
NEUTROPHILS NFR BLD AUTO: 57 %
NRBC # BLD AUTO: 0 10E3/UL
NRBC BLD AUTO-RTO: 0 /100
PLATELET # BLD AUTO: 266 10E3/UL (ref 150–450)
POTASSIUM SERPL-SCNC: 3.6 MMOL/L (ref 3.4–5.3)
PROT SERPL-MCNC: 7.2 G/DL (ref 6.4–8.3)
RBC # BLD AUTO: 5.48 10E6/UL (ref 4.4–5.9)
SODIUM SERPL-SCNC: 143 MMOL/L (ref 135–145)
WBC # BLD AUTO: 7.8 10E3/UL (ref 4–11)

## 2025-01-31 PROCEDURE — 93000 ELECTROCARDIOGRAM COMPLETE: CPT | Performed by: FAMILY MEDICINE

## 2025-01-31 PROCEDURE — 36415 COLL VENOUS BLD VENIPUNCTURE: CPT | Performed by: FAMILY MEDICINE

## 2025-01-31 PROCEDURE — 83036 HEMOGLOBIN GLYCOSYLATED A1C: CPT | Performed by: FAMILY MEDICINE

## 2025-01-31 PROCEDURE — 80053 COMPREHEN METABOLIC PANEL: CPT | Performed by: FAMILY MEDICINE

## 2025-01-31 PROCEDURE — 85025 COMPLETE CBC W/AUTO DIFF WBC: CPT | Performed by: FAMILY MEDICINE

## 2025-01-31 PROCEDURE — 99214 OFFICE O/P EST MOD 30 MIN: CPT | Performed by: FAMILY MEDICINE

## 2025-01-31 PROCEDURE — G2211 COMPLEX E/M VISIT ADD ON: HCPCS | Performed by: FAMILY MEDICINE

## 2025-01-31 RX ORDER — AZITHROMYCIN 250 MG/1
TABLET, FILM COATED ORAL
Qty: 6 TABLET | Refills: 0 | Status: SHIPPED | OUTPATIENT
Start: 2025-01-31 | End: 2025-02-05

## 2025-01-31 NOTE — PATIENT INSTRUCTIONS
Patient Education   Plantar Fasciitis: Care Instructions  Overview     Plantar fasciitis is pain and inflammation of the plantar fascia, the tissue at the bottom of your foot that connects the heel bone to the toes. The plantar fascia also supports the arch. If you strain the plantar fascia, it can develop small tears and cause heel pain when you stand or walk.  Plantar fasciitis can be caused by running or other sports. It also may occur in people who are overweight or who have high arches or flat feet. You may get plantar fasciitis if you walk or stand for long periods, or have a tight Achilles tendon or calf muscles.  You can improve your foot pain with rest and other care at home. It might take a few weeks to a few months for your foot to heal completely.  Follow-up care is a key part of your treatment and safety. Be sure to make and go to all appointments, and call your doctor if you are having problems. It's also a good idea to know your test results and keep a list of the medicines you take.  How can you care for yourself at home?  Rest your feet often. Reduce your activity to a level that lets you avoid pain. If possible, do not run or walk on hard surfaces.  Ask your doctor if you can take an over-the-counter pain medicine, such as acetaminophen (Tylenol), ibuprofen (Advil, Motrin), or naproxen (Aleve). Be safe with medicines. Read and follow all instructions on the label.  Use ice massage to help with pain. You can use an ice cube or an ice cup several times a day. To make an ice cup, fill a paper cup with water and freeze it. Cut off the top of the cup until a half-inch of ice shows. Hold onto the remaining paper to use the cup. Rub the ice in small circles over the area for 5 to 7 minutes.  Wear a night splint if your doctor suggests it. A night splint holds your foot with the toes pointed up and the foot and ankle at a 90-degree angle. This position gives the bottom of your foot a constant, gentle  "stretch.  Do simple exercises such as calf stretches and marble pickups. Stretches help the plantar fascia become more flexible. [place] pickups help strengthen the muscles that support your arch.  Wear shoes with good arch support. Athletic shoes or shoes with a well-cushioned sole are good choices.  Replace athletic shoes regularly.  Try heel cups or shoe inserts (orthotics) to help cushion your heel. You can buy these at many shoe stores.  Put on your shoes as soon as you get out of bed. Going barefoot or wearing slippers may make your pain worse.  Reach and stay at a good weight for your height. This puts less strain on your feet.  When should you call for help?   Call your doctor now or seek immediate medical care if:    You have heel pain with fever, redness, or warmth in your heel.     You cannot put weight on the sore foot.   Watch closely for changes in your health, and be sure to contact your doctor if:    You have numbness or tingling in your heel.     Your heel pain lasts more than 2 weeks.   Where can you learn more?  Go to https://www.[employer]/patiented  Enter X351 in the search box to learn more about \"Plantar Fasciitis: Care Instructions.\"  Current as of: July 31, 2024  Content Version: 14.3    2024 [employer].   Care instructions adapted under license by your healthcare professional. If you have questions about a medical condition or this instruction, always ask your healthcare professional. [employer] disclaims any warranty or liability for your use of this information.    Patient Education   Plantar Fasciitis: Exercises  Introduction  Here are some examples of exercises for you to try. The exercises may be suggested for a condition or for rehabilitation. Start each exercise slowly. Ease off the exercises if you start to have pain.  You will be told when to start these exercises and which ones will work best for you.  How to do the exercises  Calf stretch (seated, knee " straight)    Sit on the floor with your affected leg straight and resting on the floor.  Place a towel around your affected foot.  Hold one end of the towel in each hand.  Pull back gently with the towel so that you feel a stretch in your calf.  Hold the position for 15 to 30 seconds.  Repeat 2 to 4 times.  It's a good idea to repeat these steps with your other leg.  Calf stretch (back knee straight)    Stand facing a wall with your hands on the wall. You can also do this with your hands on the back of a chair, a counter, or a tree.  Put one leg about a step behind your other leg, with your toes pointing forward.  Keeping your back leg straight and your back heel on the floor, bend your front knee and gently bring your hip and chest toward the wall until you feel a stretch in the calf of your back leg.  Hold the stretch for 15 to 30 seconds.  Repeat 2 to 4 times for each leg.  Calf stretch on a step    Stand on the bottom step of a staircase, facing up toward the stairs. Put the balls of your feet on the step. Hold on to the handrail or wall.  Slowly let your heels down over the edge of the step as you relax your calf muscles. You should feel a gentle stretch up the back of your leg to your knee.  Hold the stretch about 15 to 30 seconds, and then tighten your calf muscle a little to bring your heel back up to the level of the step.  Repeat 2 to 4 times.  Towel scrunch    Sit in a chair, and place your affected foot on a towel on a hard floor (not a floor with carpet).  Scrunch the towel toward you with your toes. Then use your toes to push the towel back into place.  Repeat 8 to 12 times.  It's a good idea to repeat these steps with your other foot.  Make this exercise more challenging by placing a weighted object, such as a soup can, on the other end of the towel.  [place] pickups    Put some marbles, dice, or small smooth rocks on the floor next to a cup.  Sit in a chair, and use the toes of your affected foot to  lift up one item from the floor. Then try to put the item in the cup.  Repeat 8 to 12 times.  It's a good idea to repeat these steps with your other foot.  Follow-up care is a key part of your treatment and safety. Be sure to make and go to all appointments, and call your doctor if you are having problems. It's also a good idea to know your test results and keep a list of the medicines you take.  Current as of: July 31, 2024  Content Version: 14.3    2024 [employer].   Care instructions adapted under license by your healthcare professional. If you have questions about a medical condition or this instruction, always ask your healthcare professional. [employer] disclaims any warranty or liability for your use of this information.

## 2025-01-31 NOTE — PROGRESS NOTES
{PROVIDER CHARTING PREFERENCE:896227}    Olena Franco is a 54 year old, presenting for the following health issues:  Musculoskeletal Problem (Due to diabetes)      1/31/2025     9:38 AM   Additional Questions   Roomed by Charly BARILLAS     {MA/LPN/RN Pre-Provider Visit Orders- hCG/UA/Strep (Optional):797121}  Diabetes Follow-up    How often are you checking your blood sugar? Two times daily  Blood sugar testing frequency justification:   Drs orders  What time of day are you checking your blood sugars (select all that apply)?  Before meals and Before and after meals  Have you had any blood sugars above 200?  No  Have you had any blood sugars below 70?  No  What symptoms do you notice when your blood sugar is low?  None  What concerns do you have today about your diabetes? None and Other: Feet   Do you have any of these symptoms? (Select all that apply)  Burning in feet and pain  Have you had a diabetic eye exam in the last 12 months? No        BP Readings from Last 2 Encounters:   01/31/25 (!) 140/90   08/28/24 128/78     Hemoglobin A1C (%)   Date Value   08/28/2024 6.2 (H)   04/17/2024 6.4 (H)     LDL Cholesterol Calculated (mg/dL)   Date Value   04/17/2024 83   05/01/2023 62   07/09/2021 81       {Reference  Diabetes Management Resources  Blood Glucose Log - 3 weeks  Blood Glucose Log with Food and Insulin Record :692208}  How many servings of fruits and vegetables do you eat daily?  2-3  On average, how many sweetened beverages do you drink each day (Examples: soda, juice, sweet tea, etc.  Do NOT count diet or artificially sweetened beverages)?   2  How many days per week do you exercise enough to make your heart beat faster? 7  How many minutes a day do you exercise enough to make your heart beat faster? 60 or more  How many days per week do you miss taking your medication? 0  {additonal problems for provider to add (Optional):899109}    {ROS Picklists (Optional):891953}      Objective    BP (!) 140/90   " Pulse 88   Temp 97.9  F (36.6  C) (Temporal)   Resp 12   Ht 1.89 m (6' 2.41\")   Wt 89 kg (196 lb 3.2 oz)   SpO2 98%   BMI 24.91 kg/m    Body mass index is 24.91 kg/m .  Physical Exam   {Exam List (Optional):386094}    {Diagnostic Test Results (Optional):562704}        Signed Electronically by: Cary Martinez Mai, MD  {Email feedback regarding this note to [email or web address]   :777480}  " fasciitis  Comment: Discussed with him about nature of the condition, its potential causes and treatment options.  Emphasize the importance of good shoes support, preferably with tennis shoes, especially with his long hour of walking and standing at work.  Nightly foot message with warm bottle of water was recommended.  Continue with Ibuprofen TID with food until pain resolved and then as needed. Stretching exercise demonstrated and encouraged. In a mean time, recommend avoiding activities that would make his symptoms worse.  Call in or follow up if symptoms persist or worse.  Information about plantar fasciitis was given and all of his questions were answered.       (R05.3) Chronic cough  Comment: Likely smoker's cough.  He was again strongly encouraged to stop smoking.  He does not look acutely sick and there was no sign or symptoms of acute COPD exacerbation.  Discussed about getting a chest x-ray but decided to hold off for now due to concerns at the medical cost which is understandable.  He had a chest CT scan done about 6 months ago and it showed benign stable nodules.  Will treat him empirically for bronchitis with Zithromax.  I encouraged him to let me know if not improved with Zithromax, will send for chest x-ray.  In the meantime, he was encouraged to continue with the Spiriva daily and as needed albuterol inhaler.  Symptoms that need to be seen to call in discussed.  ER if develops breathing difficulty.  He felt comfortable with the plan.    Plan: azithromycin (ZITHROMAX) 250 MG tablet            (F17.200) Tobacco use disorder  Comment: Been smoking for years - much heavier up to 2 ppd for years.  Currently smoking about  1/2 ppd - not ready to quit.  Also has COPD, diabetes, TIA, and high blood pressure.  Discussed about the long and short term consequences of tobacco smoking.  Discussed about different options for smoking cessation aids; not ready to quit at this time.  Encourage to let me know when he  is ready to quit.  In a mean time, I encourage him to reduce to amount of cigarrets smoke as much as possible.  His recent chest CT scan shows stable benign looking nodules, will continue with lung cancer screening annually.  All of his questions were answered.      The longitudinal plan of care for the diagnosis(es)/condition(s) as documented were addressed during this visit. Due to the added complexity in care, I will continue to support Salvador in the subsequent management and with ongoing continuity of care.     Regular exercise    Subjective   Salvador is a 54 year old, presenting for the following health issues:  Musculoskeletal Problem (Due to diabetes)      1/31/2025     9:38 AM   Additional Questions   Roomed by Charly BARILLAS       Diabetes Follow-up    How often are you checking your blood sugar? Two times daily  Blood sugar testing frequency justification:   Drs orders  What time of day are you checking your blood sugars (select all that apply)?  Before meals and Before and after meals  Have you had any blood sugars above 200?  No  Have you had any blood sugars below 70?  No  What symptoms do you notice when your blood sugar is low?  None  What concerns do you have today about your diabetes? None and Other: Feet   Do you have any of these symptoms? (Select all that apply)  Burning in feet and pain  Have you had a diabetic eye exam in the last 12 months? No        BP Readings from Last 2 Encounters:   01/31/25 (!) 140/90   08/28/24 128/78     Hemoglobin A1C (%)   Date Value   08/28/2024 6.2 (H)   04/17/2024 6.4 (H)     LDL Cholesterol Calculated (mg/dL)   Date Value   04/17/2024 83   05/01/2023 62   07/09/2021 81         How many servings of fruits and vegetables do you eat daily?  2-3  On average, how many sweetened beverages do you drink each day (Examples: soda, juice, sweet tea, etc.  Do NOT count diet or artificially sweetened beverages)?   2  How many days per week do you exercise enough to make your  "heart beat faster? 7  How many minutes a day do you exercise enough to make your heart beat faster? 60 or more  How many days per week do you miss taking your medication? 0      Salvador was seen today for general follow-up with couple concerns.  First concern is the chest pain that he has had on and off for a \"long time\", has gotten worse in the last couple weeks.  It is a dull and achy with no pressure sensation across his lower chest, mainly on the left side.  Does not radiate to the shoulder or the neck.  It comes and goes known no known trigger, not worsening with exertion - although he feels it is there for most of the time.  It also happens while at rest.  Did not believe the acid reflux symptoms.  No associated with shortness of breath or diaphoresis although he has had episodes of sweating with it.  No history of CAD but there was question of heart enlargement when she was in [place].  No orthopnea.  No leg swelling.  He has history of TIA.  He smokes about 1/2 ppd, used to smoke much heavier for years.  Not have any chest pain today.    Also been having the cough in the last 4 to 5 months.  It is getting worse.  Again, he is a smoker about half a pack per day.  The cough is productive with clear at times greenish sputum.  He feels it is very congested into his chest.  No runny nose or nasal congestion.  No sinus pain or pressure.  No fever or chills.  No associated shortness of breath.  Been having the chest pain as above but he does not feel that they are related.    Also been having the foot pain for 2 weeks and it is getting worse.  Is on the right foot, mainly on the bottom of his foot that radiate to the heel.  Happened randomly, no known trigger.  No change in his shoes.  It is sharp with unbearable pain especially when he goes to get up in the morning or if he stands up from a prolonged period of time.  It seemed to get better slowly and his active bleeding is worse at the end of the day.  No injury or " "swelling.  No back pain.  Never had this before.    He also has DM, HTN and hyperlipidemia which have been controlled.  Been taking the medications as prescribed with no side effects.  He takes Metformin for DM, amlodipine, Hyzaar and metoprolol for HTN and Lipitor for high cholesterol. Not checking BP but his blood sugar has been running around 110s fasting, no hypoglycemic or hypotensive symptoms.  Been working on eating healthier diet.  Although he does not exercise regularly, he has been walking up to 6-7 miles a day with his job as a  at the local [employer].  No headache, dizziness, or acute visual change.  Last eye exam was over a year ago and was told it was normal.  No numbness or tingling sensation on feet or fingers.  No leg swelling, dyspnea or orthopnea.       He has a history of TIA multiple times. Been taking the Lipitor and aspirin as prescribed.  Blood pressure and diabetes have been controlled.  Still smokes 1/2 pack a day, not ready to quit.  No headache or dizziness.  No focal weakness or problems with speech. No double vision vision or blurred vision.    No other concern today        Review of Systems  Constitutional, HEENT, cardiovascular, pulmonary, gi and gu systems are negative, except as otherwise noted.      Objective    /78   Pulse 88   Temp 97.9  F (36.6  C) (Temporal)   Resp 12   Ht 1.89 m (6' 2.41\")   Wt 89 kg (196 lb 3.2 oz)   SpO2 98%   BMI 24.91 kg/m    Body mass index is 24.91 kg/m .  Physical Exam   GENERAL: healthy, alert and no distress, speaking full sentences.  HENT: Ear canals and TM's normal.  Nares are non-congested. Oropharynx is pink and moist. No tender with palpation to the sinuses.  NECK: no adenopathy, supple, no lymphadenopathy or thyromegaly.  No JV distention or carotid bruits  RESP: lungs clear to auscultation - no rales, rhonchi or wheezes.  Good respiratory effort throughout, but decrease breath sound throughout  CV: regular rate and " rhythm, no murmur.  ABDOMEN: soft, nondistended, nontender, no palpable masses or organomegaly with normal bowel sounds.  MS: no gross musculoskeletal defects noted.  No peripheral edema with strong pedal pulses bilaterally  NEURO: Normal strength and tone, mentation intact and speech normal.  Cranial nerves II through XII intact, DTR +2 throughout, no focal neurological deficit.  PSYCH: mentation appears normal, affect normal/bright.  Thoughts intact, no hallucination.  Foot: No swelling.  Ankle exam was normal.  Tender will place into the right plantar arch and heel.        Results for orders placed or performed in visit on 01/31/25   Hemoglobin A1c     Status: Abnormal   Result Value Ref Range    Estimated Average Glucose 137 (H) <117 mg/dL    Hemoglobin A1C 6.4 (H) <5.7 %   Comprehensive metabolic panel (BMP + Alb, Alk Phos, ALT, AST, Total. Bili, TP)     Status: Abnormal   Result Value Ref Range    Sodium 143 135 - 145 mmol/L    Potassium 3.6 3.4 - 5.3 mmol/L    Carbon Dioxide (CO2) 26 22 - 29 mmol/L    Anion Gap 14 7 - 15 mmol/L    Urea Nitrogen 23.6 (H) 6.0 - 20.0 mg/dL    Creatinine 0.81 0.67 - 1.17 mg/dL    GFR Estimate >90 >60 mL/min/1.73m2    Calcium 9.5 8.8 - 10.4 mg/dL    Chloride 103 98 - 107 mmol/L    Glucose 120 (H) 70 - 99 mg/dL    Alkaline Phosphatase 75 40 - 150 U/L    AST 15 0 - 45 U/L    ALT 16 0 - 70 U/L    Protein Total 7.2 6.4 - 8.3 g/dL    Albumin 4.4 3.5 - 5.2 g/dL    Bilirubin Total 0.4 <=1.2 mg/dL   CBC with platelets and differential     Status: None   Result Value Ref Range    WBC Count 7.8 4.0 - 11.0 10e3/uL    RBC Count 5.48 4.40 - 5.90 10e6/uL    Hemoglobin 16.8 13.3 - 17.7 g/dL    Hematocrit 47.9 40.0 - 53.0 %    MCV 87 78 - 100 fL    MCH 30.7 26.5 - 33.0 pg    MCHC 35.1 31.5 - 36.5 g/dL    RDW 13.2 10.0 - 15.0 %    Platelet Count 266 150 - 450 10e3/uL    % Neutrophils 57 %    % Lymphocytes 33 %    % Monocytes 6 %    % Eosinophils 4 %    % Basophils 1 %    % Immature Granulocytes 0  %    NRBCs per 100 WBC 0 <1 /100    Absolute Neutrophils 4.5 1.6 - 8.3 10e3/uL    Absolute Lymphocytes 2.6 0.8 - 5.3 10e3/uL    Absolute Monocytes 0.4 0.0 - 1.3 10e3/uL    Absolute Eosinophils 0.3 0.0 - 0.7 10e3/uL    Absolute Basophils 0.1 0.0 - 0.2 10e3/uL    Absolute Immature Granulocytes 0.0 <=0.4 10e3/uL    Absolute NRBCs 0.0 10e3/uL   CBC with platelets and differential     Status: None    Narrative    The following orders were created for panel order CBC with platelets and differential.  Procedure                               Abnormality         Status                     ---------                               -----------         ------                     CBC with platelets and d...[[id number]]                      Final result                 Please view results for these tests on the individual orders.           Signed Electronically by: Cary Martinez Mai, MD

## 2025-03-05 DIAGNOSIS — I10 HYPERTENSION, UNSPECIFIED TYPE: ICD-10-CM

## 2025-03-05 RX ORDER — LOSARTAN POTASSIUM AND HYDROCHLOROTHIAZIDE 25; 100 MG/1; MG/1
1 TABLET ORAL DAILY
Qty: 90 TABLET | Refills: 0 | Status: SHIPPED | OUTPATIENT
Start: 2025-03-05

## 2025-04-11 ENCOUNTER — NURSE TRIAGE (OUTPATIENT)
Dept: FAMILY MEDICINE | Facility: CLINIC | Age: 55
End: 2025-04-11
Payer: COMMERCIAL

## 2025-04-14 NOTE — TELEPHONE ENCOUNTER
RN Triage    Patient Contact    Attempt # 1    Was call answered?  No.  Left message on voicemail with information to call me back. and sent MCM.     Sandhya Bryant RN on 4/14/2025 at 8:04 AM

## 2025-04-15 NOTE — TELEPHONE ENCOUNTER
"Nurse Triage SBAR    Is this a 2nd Level Triage? YES, LICENSED PRACTITIONER REVIEW IS REQUIRED    Situation: Patient sent Peggyhart reporting he thinks he has COVID. See Walter.     Background: Patient states he has had COVID in the past, and received an infusion for this as he is high risk. He states he has been feeling the same way as the last time he had COVID. He states he has COPD, diabetes, and an enlarged heart. States due to his conditions, he has SOB, cough with sputum, and chest pain/pressure at baseline. Also states he has chronic headaches.    Assessment: Patient states his symptoms stated last Monday. He states he missed work Monday-Saturday last week due to being ill. States he is back to work now, feeling better than last week but still having symptoms. He states last week he was having a hard time breathing, a hard cough, increased sputum from baseline stating he had \"a lot of phlegm\", chills, fevers, difficulty sleeping for 4 days due to the cough, dizzy/lightheadedness with standing up, increased chest pain/pressure from baseline, vomiting. He states he was using his Spiriva inhaler an extra time per day, but is now back to using it once per day as prescribed. He states the night before last he woke up soaking wet and he believes that's when the fevers broke. He states he lost 8 pounds due to not eating much and vomiting food he was trying to eat. He states the last time he vomited was yesterday morning. States he is now able to eat more. He states he has been staying hydrated, drinking Gatorade, and has still been making urine. He states his chest pain/pressure and SOB have returned to baseline. States on Sunday he started having headaches that were different than his usual headaches. States he just has a mild headache now. States he has been checking his oxygen and blood sugars and those have been good. States he wakes up every day feeling better, but then as the day goes on he feels worse again. " "States overall he feels better than last week, but feels his symptoms are just  \"never ending.\"      Protocol Recommended Disposition:   Discuss With PCP And Callback By Nurse Within 1 Hour    Recommendation: Per protocol, Discuss with PCP and Callback by Nurse Within 1 Hour. Notified patient will route to PCP for review and we will return call. Advised him to call back or seek urgent/emergency care for new or worsening symptoms. He verbalized understanding and had no further questions or concerns.     KATIUSKA HerreraN, RN         Reason for Disposition   HIGH RISK patient (e.g., weak immune system, age > 64 years, obesity with BMI of 30 or higher, pregnant, chronic lung disease) and COVID symptoms (e.g., cough, fever) (Exceptions: Already seen by doctor or NP/PA and no new or worsening symptoms.)    Additional Information   Negative: SEVERE difficulty breathing (e.g., struggling for each breath, speaks in single words)   Negative: Difficult to awaken or acting confused (e.g., disoriented, slurred speech)   Negative: Bluish (or gray) lips or face now   Negative: Shock suspected (e.g., cold/pale/clammy skin, too weak to stand, low BP, rapid pulse)   Negative: Sounds like a life-threatening emergency to the triager   Negative: Diagnosed or suspected COVID-19 and symptoms lasting 3 or more weeks   Negative: COVID-19 exposure and no symptoms   Negative: COVID-19 vaccine reaction suspected (e.g., fever, headache, muscle aches) occurring 1 to 3 days after getting vaccine   Negative: COVID-19 vaccine, questions about   Negative: Exposure to someone known to have influenza (flu test positive) and flu-like symptoms (e.g., cough, runny nose, sore throat, SOB; with or without fever)   Negative: Possible COVID-19 symptoms and triager concerned about severity of symptoms or other causes   Negative: COVID-19 and breastfeeding, questions about   Negative: SEVERE or constant chest pain or pressure  (Exception: Mild central chest " pain, present only when coughing.)   Negative: MODERATE difficulty breathing (e.g., speaks in phrases, SOB even at rest, pulse 100-120)   Negative: Headache and stiff neck (can't touch chin to chest)   Negative: Oxygen level (e.g., pulse oximetry) 90% or lower   Negative: Chest pain or pressure  (Exception: MILD central chest pain, present only when coughing.)   Negative: Drinking very little and dehydration suspected (e.g., no urine > 12 hours, very dry mouth, very lightheaded)   Negative: Patient sounds very sick or weak to the triager   Negative: MILD difficulty breathing (e.g., minimal/no SOB at rest, SOB with walking, pulse <100)   Negative: Fever > 103 F (39.4 C)   Negative: Fever > 101 F (38.3 C) and over 60 years of age   Negative: Fever > 100 F (37.8 C) and bedridden (e.g., CVA, chronic illness, recovering from surgery)    Protocols used: COVID-19 - Diagnosed or [id number]

## 2025-04-15 NOTE — TELEPHONE ENCOUNTER
Patient scheduled for 5/6/25 to follow up with ongoing symptoms after Covid.      Chu Davis RN on 4/15/2025 at 12:49 PM

## 2025-04-15 NOTE — TELEPHONE ENCOUNTER
Sound like he is doing better and he is back to work.  I am not really sure what his main concern today is.  If he feels that he is not doing well, then yes please have him to follow-up.  Go to the emergency room if develop significant shortness of breath, trouble breathing or if there are any concerns.

## 2025-05-06 ENCOUNTER — HOSPITAL ENCOUNTER (OUTPATIENT)
Dept: CT IMAGING | Facility: CLINIC | Age: 55
Discharge: HOME | End: 2025-05-06
Attending: FAMILY MEDICINE | Admitting: FAMILY MEDICINE
Payer: COMMERCIAL

## 2025-05-06 ENCOUNTER — OFFICE VISIT (OUTPATIENT)
Dept: FAMILY MEDICINE | Facility: CLINIC | Age: 55
End: 2025-05-06
Payer: COMMERCIAL

## 2025-05-06 VITALS
HEIGHT: 74 IN | BODY MASS INDEX: 24.59 KG/M2 | RESPIRATION RATE: 16 BRPM | HEART RATE: 80 BPM | WEIGHT: 191.6 LBS | OXYGEN SATURATION: 97 % | TEMPERATURE: 98 F | DIASTOLIC BLOOD PRESSURE: 76 MMHG | SYSTOLIC BLOOD PRESSURE: 122 MMHG

## 2025-05-06 DIAGNOSIS — R91.8 ABNORMAL CT LUNG SCREENING: ICD-10-CM

## 2025-05-06 DIAGNOSIS — N52.9 ERECTILE DYSFUNCTION, UNSPECIFIED ERECTILE DYSFUNCTION TYPE: ICD-10-CM

## 2025-05-06 DIAGNOSIS — F17.200 TOBACCO USE DISORDER: ICD-10-CM

## 2025-05-06 DIAGNOSIS — E11.9 TYPE 2 DIABETES MELLITUS WITHOUT COMPLICATION, WITHOUT LONG-TERM CURRENT USE OF INSULIN (H): ICD-10-CM

## 2025-05-06 DIAGNOSIS — I10 HYPERTENSION, UNSPECIFIED TYPE: ICD-10-CM

## 2025-05-06 DIAGNOSIS — Z86.73 HISTORY OF TIA (TRANSIENT ISCHEMIC ATTACK) AND STROKE: ICD-10-CM

## 2025-05-06 DIAGNOSIS — J42 CHRONIC BRONCHITIS, UNSPECIFIED CHRONIC BRONCHITIS TYPE (H): Primary | ICD-10-CM

## 2025-05-06 PROCEDURE — G2211 COMPLEX E/M VISIT ADD ON: HCPCS | Performed by: FAMILY MEDICINE

## 2025-05-06 PROCEDURE — 71250 CT THORAX DX C-: CPT

## 2025-05-06 PROCEDURE — 1125F AMNT PAIN NOTED PAIN PRSNT: CPT | Performed by: FAMILY MEDICINE

## 2025-05-06 PROCEDURE — 3074F SYST BP LT 130 MM HG: CPT | Performed by: FAMILY MEDICINE

## 2025-05-06 PROCEDURE — 3078F DIAST BP <80 MM HG: CPT | Performed by: FAMILY MEDICINE

## 2025-05-06 PROCEDURE — 99213 OFFICE O/P EST LOW 20 MIN: CPT | Performed by: FAMILY MEDICINE

## 2025-05-06 ASSESSMENT — PAIN SCALES - GENERAL: PAINLEVEL_OUTOF10: SEVERE PAIN (7)

## 2025-05-06 NOTE — PROGRESS NOTES
Assessment & Plan     (J42) COPD  Comment: Recent URI is clearing nicely - not concern about it. Known to have COPD and is still smoking up to half a pack a day.  Continues to have productive cough most likely due to COPD and/or smoker's cough.  He was sent for chest CT scan to to follow-up on the pulmonary nodule.  Will consider treating if indication for pneumonia.  Advised to quit smoking, but not ready at this time.  No signs or symptoms of COPD exacerbation.  Symptoms that need to be seen or call in discussed.  In the meantime, continue with the rescue inhaler and/for nebulizer as needed. Maintenance inhaler to be used daily as needed during the active seasons. I discussed about options for maintaining inhaler, including Advair or Symbicort. .    (E11.9) Type 2 diabetes mellitus without complication, without long-term current use of insulin (H)  Comment: Been controlled.  Also has high blood pressure, hyperlipidemia and history of TIA with a long history of heavy tobacco smoking.  The goal for his hemoglobin A1c is to be less than 7.0.  Will continue with the metformin at the current dose.  Also will continue with aspirin, Hyzaar and Lipitor.  Encouraged to continue working on healthy diet, exercising and weight management.   Most recent lab work showed Hbg A1C of 6.4 with good kidney function.  F/U in 6 months.      (F17.200) Tobacco use disorder  Comment:  Been smoking for years - much heavier up to 2 ppd for years.  Currently smoking about  1/2 ppd - not ready to quit.  Also has COPD, diabetes, TIA, and high blood pressure.  Discussed about the long and short term consequences of tobacco smoking.  Discussed about different options for smoking cessation aids; not ready to quit at this time.  Encourage to let me know when he is ready to quit.  In a mean time, I encourage him to reduce to amount of cigarrets smoke as much as possible.  Follow-up low dose CT scan today, results pending. All of his questions  were answered.     (Z86.73) History of TIA (transient ischemic attack) and stroke  Comment:  Overall doing well, no residual effect.   Blood pressure, diabetes and cholesterol have bee controlled.  He is strongly encouraged to stop smoking.  Emphasized on the importance of controlling all modifiable risks.  Continue with the Lipitor and aspirin - long term treatment.  Encouraged to exercise at least 5 days a week.     (I10) Hypertension, unspecified type  Comment:  BP is normal and stable with the Hyzaar, amlodipine and metoprolol.  Has a history of stroke, high cholesterol, diabetes, and tobacco smoking - the goal for his blood pressure to be less than 130/80.  Tolerating the medications well.  Most recent kidney function was normal.  Healthy/low salt diet, daily excercise and weight management discussed and encouraged.  We will continue with the current medications, follow up in 6 month, earlier as needed.     (N52.9) Erectile dysfunction, unspecified erectile dysfunction type  Comment: Currently stable with tadalafil use. No side effects noted. Reordered prescription today.       The longitudinal plan of care for the diagnosis(es)/condition(s) as documented were addressed during this visit. Due to the added complexity in care, I will continue to support Salvador in the subsequent management and with ongoing continuity of care.       Nicotine/Tobacco Cessation  He reports that he has been smoking cigarettes. He started smoking about 45 years ago. He has a 44.2 pack-year smoking history. He has never used smokeless tobacco.  Nicotine/Tobacco Cessation Plan  Information offered: Patient not interested at this time      Follow-up   Repeat labs in 6 months with in clinic follow-up.     Subjective   Salvador is a 54 year old, presenting for the following health issues:   ongoing symptoms after Covid      5/6/2025     9:39 AM   Additional Questions   Roomed by Alyssa Cheek     History of Present Illness       Reason for  "visit:  Follow    He eats 2-3 servings of fruits and vegetables daily.He consumes 4 sweetened beverage(s) daily.He exercises with enough effort to increase his heart rate 60 or more minutes per day.  He exercises with enough effort to increase his heart rate 5 days per week. He is missing 7 dose(s) of medications per week.      Salvador presents today for respiratory issues related to a recent URI 3 weeks ago. His illness lasted one week and he did miss work during that time. Feeling better overall, back to his baseline except of  increased phlegm with a cough. The cough has been present for a long time due to his smoking and COPD. The phlegm does not have any odor or abnormal color.  No fevers or chills. No SOB that is outside of his baseline. No CP, lightheadedness, dizziness, N/V/D/C. He overall has no concerns related to his recent illness at this point. He would also like to have his medications refilled at this time as well.  He takes metformin for diabetes, amlodipine metoprolol and Hyzaar for high blood pressure and Lipitor for high cholesterol.  He used Spiriva as needed only due to the cost.  He has albuterol inhaler and neb to be used as needed which he typically uses rarely.  No side effect from the medication.    Review of Systems  Constitutional, HEENT, cardiovascular, pulmonary, GI, , musculoskeletal, neuro, skin, endocrine and psych systems are negative, except as otherwise noted.      Objective    /76   Pulse 80   Temp 98  F (36.7  C) (Temporal)   Resp 16   Ht 1.874 m (6' 1.78\")   Wt 86.9 kg (191 lb 9.6 oz)   SpO2 97%   BMI 24.75 kg/m    Body mass index is 24.75 kg/m .  Physical Exam   GENERAL: alert and no distress.  Speaking in full sentences.  HEENT: TMs are pearly white.  Nares are  congested with clear drainage.  Oropharynx pink and moist.  No tonsillar hypertrophy, exudate or erythema.  No tender with palpation to the sinuses.  RESP: lungs clear to auscultation - no rales, rhonchi " or wheezes.  Good respiratory effort throughout  CV: regular rate and rhythm, no murmur, no peripheral edema  MS: no gross musculoskeletal defects noted, no edema      Results for orders placed or performed during the hospital encounter of 05/06/25   CT Chest Low Dose Non Contrast     Status: None    Narrative    EXAM: CT CHEST LOW DOSE NON CONTRAST  LOCATION: [facility]  DATE: 5/6/2025    INDICATION: Follow up from Lung Cancer Screening CT on 9 9 24  COMPARISON: 9/9/2024   TECHNIQUE: CT chest without IV contrast. Multiplanar reformats were obtained. Dose reduction techniques were used.  CONTRAST: None.    FINDINGS:   LUNGS AND PLEURA: Mild centrilobular and paraseptal emphysema. Faint areas of groundglass attenuation have developed in the perihilar regions of both upper lungs. An area of groundglass attenuation previously present in the right upper lobe has resolved.   Band of scarring and atelectasis in the lingula. Calcified granuloma right middle lobe.     MEDIASTINUM/AXILLAE: Normal.    CORONARY ARTERY CALCIFICATION: Mild calcified atherosclerosis left anterior descending and circumflex coronary arteries.     UPPER ABDOMEN: Tiny nonobstructing left kidney stone.     MUSCULOSKELETAL: Minimal degenerative change thoracic spine.       Impression    IMPRESSION:   Negative for lung cancer screening purposes. Patchy areas of groundglass inflammation previously present in the right upper lobe posterior segment has resolved, but new areas of inflammation have developed in both lungs.               Kevin Doyle, MS3  Medical Student      I was present with the medical student who participated in the service and in the documentation of the note. I have verified the history and personally performed the physical exam and medical decision making. I reviewed the note in detail and edited it appropriately. I agree with the assessment and plan of care as documented in the note.      Signed  Electronically by: Cary Martinez Mai, MD

## 2025-05-07 ENCOUNTER — RESULTS FOLLOW-UP (OUTPATIENT)
Dept: FAMILY MEDICINE | Facility: CLINIC | Age: 55
End: 2025-05-07

## 2025-05-07 DIAGNOSIS — J18.9 COMMUNITY ACQUIRED PNEUMONIA, UNSPECIFIED LATERALITY: Primary | ICD-10-CM

## 2025-05-08 RX ORDER — LEVOFLOXACIN 750 MG/1
750 TABLET, FILM COATED ORAL DAILY
Qty: 7 TABLET | Refills: 0 | Status: SHIPPED | OUTPATIENT
Start: 2025-05-08 | End: 2025-05-15

## 2025-05-11 ENCOUNTER — HEALTH MAINTENANCE LETTER (OUTPATIENT)
Age: 55
End: 2025-05-11

## 2025-05-11 PROBLEM — J44.9 COPD (CHRONIC OBSTRUCTIVE PULMONARY DISEASE) (H): Status: ACTIVE | Noted: 2025-05-11

## 2025-05-11 RX ORDER — LOSARTAN POTASSIUM AND HYDROCHLOROTHIAZIDE 25; 100 MG/1; MG/1
1 TABLET ORAL DAILY
Qty: 90 TABLET | Refills: 0 | Status: SHIPPED | OUTPATIENT
Start: 2025-05-11

## 2025-05-11 RX ORDER — TADALAFIL 20 MG/1
20 TABLET ORAL EVERY 24 HOURS
Qty: 15 TABLET | Refills: 11 | Status: SHIPPED | OUTPATIENT
Start: 2025-05-11

## 2025-05-11 RX ORDER — ALBUTEROL SULFATE 90 UG/1
2 INHALANT RESPIRATORY (INHALATION) EVERY 4 HOURS PRN
Qty: 18 G | Refills: 3 | Status: SHIPPED | OUTPATIENT
Start: 2025-05-11

## 2025-05-11 RX ORDER — ASPIRIN 81 MG/1
81 TABLET, COATED ORAL DAILY
Qty: 90 TABLET | Refills: 1 | Status: SHIPPED | OUTPATIENT
Start: 2025-05-11

## 2025-05-11 RX ORDER — METOPROLOL SUCCINATE 25 MG/1
25 TABLET, EXTENDED RELEASE ORAL DAILY
Qty: 90 TABLET | Refills: 2 | Status: SHIPPED | OUTPATIENT
Start: 2025-05-11

## 2025-05-11 RX ORDER — METFORMIN HYDROCHLORIDE 500 MG/1
1000 TABLET, EXTENDED RELEASE ORAL 2 TIMES DAILY WITH MEALS
Qty: 360 TABLET | Refills: 2 | Status: SHIPPED | OUTPATIENT
Start: 2025-05-11

## 2025-05-11 RX ORDER — ATORVASTATIN CALCIUM 20 MG/1
20 TABLET, FILM COATED ORAL DAILY
Qty: 90 TABLET | Refills: 2 | Status: SHIPPED | OUTPATIENT
Start: 2025-05-11

## 2025-05-11 RX ORDER — IPRATROPIUM BROMIDE AND ALBUTEROL SULFATE 2.5; .5 MG/3ML; MG/3ML
1 SOLUTION RESPIRATORY (INHALATION) EVERY 6 HOURS PRN
Qty: 30 ML | Refills: 1 | Status: SHIPPED | OUTPATIENT
Start: 2025-05-11

## 2025-05-11 RX ORDER — TIOTROPIUM BROMIDE INHALATION SPRAY 3.12 UG/1
SPRAY, METERED RESPIRATORY (INHALATION)
Qty: 4 G | Refills: 11 | Status: SHIPPED | OUTPATIENT
Start: 2025-05-11

## 2025-05-11 RX ORDER — AMLODIPINE BESYLATE 5 MG/1
5 TABLET ORAL DAILY
Qty: 90 TABLET | Refills: 2 | Status: SHIPPED | OUTPATIENT
Start: 2025-05-11

## 2025-05-12 ENCOUNTER — TELEPHONE (OUTPATIENT)
Dept: PULMONOLOGY | Facility: CLINIC | Age: 55
End: 2025-05-12
Payer: COMMERCIAL

## 2025-05-12 DIAGNOSIS — R91.1 LUNG NODULE: Primary | ICD-10-CM

## 2025-05-12 NOTE — TELEPHONE ENCOUNTER
[facility] Radiology Lung Cancer Screening CT result notification:     LDCT/Lung Cancer Screening CT Exam date: 5/6/2025  Radiologist Impression AND Recommendations:   Negative for lung cancer screening purposes. Patchy areas of groundglass inflammation previously present in the right upper lobe posterior segment has resolved, but new areas of inflammation have developed in both lungs.    Pertinent Findings:  LUNGS AND PLEURA: Mild centrilobular and paraseptal emphysema. Faint areas of groundglass attenuation have developed in the perihilar regions of both upper lungs. An area of groundglass attenuation previously present in the right upper lobe has resolved.   Band of scarring and atelectasis in the lingula. Calcified granuloma right middle lobe.      Ordering Provider: Dr. Laury Crespo did receive the remaining radiology results from their PCP.        Lung Nodule Program Protocol recommendation [Pertaining to lung nodules]:  Lung RADS 0 Protocol:  Results RN to notifiy patient of results and place order for 1-3 month CT ([id number]) - MD Sign  CT Chest order ([id number]) placed to be completed within 1-3 months (Yes/No):  LDCT due on or after 6/6/2025 - 8/6/2025.      RN communicated the lung nodule finding to the patient (Yes/No):  No.  The patient had the following questions: N/a  Correct letter sent as per [facility] Lung nodule protocol (Yes/No):  Yes  Did Patient have any CT's of chest previous? (Yes/No/NA) N/a  If no comparisons used, inquire if patient has had any chest CT's in the past and if so, request priors.    Areli Jarrell, RN  RN Care Coordinator  Lung Nodule/Interventional Pulmonology Clinic  [facility]  Phone: [phone]

## 2025-07-10 ENCOUNTER — MYC MEDICAL ADVICE (OUTPATIENT)
Dept: FAMILY MEDICINE | Facility: CLINIC | Age: 55
End: 2025-07-10
Payer: COMMERCIAL

## 2025-07-17 ENCOUNTER — OFFICE VISIT (OUTPATIENT)
Dept: FAMILY MEDICINE | Facility: CLINIC | Age: 55
End: 2025-07-17
Payer: COMMERCIAL

## 2025-07-17 VITALS
SYSTOLIC BLOOD PRESSURE: 136 MMHG | OXYGEN SATURATION: 98 % | HEART RATE: 85 BPM | HEIGHT: 74 IN | TEMPERATURE: 97.7 F | DIASTOLIC BLOOD PRESSURE: 90 MMHG | WEIGHT: 192.2 LBS | BODY MASS INDEX: 24.67 KG/M2

## 2025-07-17 DIAGNOSIS — I10 HYPERTENSION, UNSPECIFIED TYPE: ICD-10-CM

## 2025-07-17 DIAGNOSIS — Z00.00 ROUTINE GENERAL MEDICAL EXAMINATION AT A HEALTH CARE FACILITY: Primary | ICD-10-CM

## 2025-07-17 DIAGNOSIS — E11.9 TYPE 2 DIABETES MELLITUS WITHOUT COMPLICATION, WITHOUT LONG-TERM CURRENT USE OF INSULIN (H): ICD-10-CM

## 2025-07-17 DIAGNOSIS — E78.5 HYPERLIPIDEMIA LDL GOAL <70: ICD-10-CM

## 2025-07-17 DIAGNOSIS — N52.9 ERECTILE DYSFUNCTION, UNSPECIFIED ERECTILE DYSFUNCTION TYPE: ICD-10-CM

## 2025-07-17 DIAGNOSIS — Z86.73 HISTORY OF TIA (TRANSIENT ISCHEMIC ATTACK) AND STROKE: ICD-10-CM

## 2025-07-17 DIAGNOSIS — J42 CHRONIC BRONCHITIS, UNSPECIFIED CHRONIC BRONCHITIS TYPE (H): ICD-10-CM

## 2025-07-17 LAB
ANION GAP SERPL CALCULATED.3IONS-SCNC: 12 MMOL/L (ref 7–15)
BUN SERPL-MCNC: 22.8 MG/DL (ref 6–20)
CALCIUM SERPL-MCNC: 9.7 MG/DL (ref 8.8–10.4)
CHLORIDE SERPL-SCNC: 101 MMOL/L (ref 98–107)
CHOLEST SERPL-MCNC: 126 MG/DL
CREAT SERPL-MCNC: 0.87 MG/DL (ref 0.67–1.17)
CREAT UR-MCNC: 199.8 MG/DL
EGFRCR SERPLBLD CKD-EPI 2021: >90 ML/MIN/1.73M2
EST. AVERAGE GLUCOSE BLD GHB EST-MCNC: 134 MG/DL
FASTING STATUS PATIENT QL REPORTED: YES
FASTING STATUS PATIENT QL REPORTED: YES
GLUCOSE SERPL-MCNC: 128 MG/DL (ref 70–99)
HBA1C MFR BLD: 6.3 %
HCO3 SERPL-SCNC: 28 MMOL/L (ref 22–29)
HDLC SERPL-MCNC: 30 MG/DL
LDLC SERPL CALC-MCNC: 72 MG/DL
MICROALBUMIN UR-MCNC: 23.2 MG/L
MICROALBUMIN/CREAT UR: 11.61 MG/G CR (ref 0–17)
NONHDLC SERPL-MCNC: 96 MG/DL
POTASSIUM SERPL-SCNC: 3.7 MMOL/L (ref 3.4–5.3)
PSA SERPL DL<=0.01 NG/ML-MCNC: 1.09 NG/ML (ref 0–3.5)
SODIUM SERPL-SCNC: 141 MMOL/L (ref 135–145)
TRIGL SERPL-MCNC: 120 MG/DL

## 2025-07-17 SDOH — HEALTH STABILITY: PHYSICAL HEALTH: ON AVERAGE, HOW MANY DAYS PER WEEK DO YOU ENGAGE IN MODERATE TO STRENUOUS EXERCISE (LIKE A BRISK WALK)?: 6 DAYS

## 2025-07-17 SDOH — HEALTH STABILITY: PHYSICAL HEALTH: ON AVERAGE, HOW MANY MINUTES DO YOU ENGAGE IN EXERCISE AT THIS LEVEL?: 60 MIN

## 2025-07-17 ASSESSMENT — PAIN SCALES - GENERAL: PAINLEVEL_OUTOF10: NO PAIN (0)

## 2025-07-17 ASSESSMENT — SOCIAL DETERMINANTS OF HEALTH (SDOH): HOW OFTEN DO YOU GET TOGETHER WITH FRIENDS OR RELATIVES?: TWICE A WEEK

## 2025-07-17 NOTE — PATIENT INSTRUCTIONS
Patient Education   Preventive Care Advice   This is general advice given by our system to help you stay healthy. However, your care team may have specific advice just for you. Please talk to your care team about your preventive care needs.  Nutrition  Eat 5 or more servings of fruits and vegetables each day.  Try wheat bread, brown rice and whole grain pasta (instead of white bread, rice, and pasta).  Get enough calcium and vitamin D. Check the label on foods and aim for 100% of the RDA (recommended daily allowance).  Lifestyle  Exercise at least 150 minutes each week  (30 minutes a day, 5 days a week).  Do muscle strengthening activities 2 days a week. These help control your weight and prevent disease.  No smoking.  Wear sunscreen to prevent skin cancer.  Have a dental exam and cleaning every 6 months.  Yearly exams  See your health care team every year to talk about:  Any changes in your health.  Any medicines your care team has prescribed.  Preventive care, family planning, and ways to prevent chronic diseases.  Shots (vaccines)   HPV shots (up to age 26), if you've never had them before.  Hepatitis B shots (up to age 59), if you've never had them before.  COVID-19 shot: Get this shot when it's due.  Flu shot: Get a flu shot every year.  Tetanus shot: Get a tetanus shot every 10 years.  Pneumococcal, hepatitis A, and RSV shots: Ask your care team if you need these based on your risk.  Shingles shot (for age 50 and up)  General health tests  Diabetes screening:  Starting at age 35, Get screened for diabetes at least every 3 years.  If you are younger than age 35, ask your care team if you should be screened for diabetes.  Cholesterol test: At age 39, start having a cholesterol test every 5 years, or more often if advised.  Bone density scan (DEXA): At age 50, ask your care team if you should have this scan for osteoporosis (brittle bones).  Hepatitis C: Get tested at least once in your life.  STIs (sexually  transmitted infections)  Before age 24: Ask your care team if you should be screened for STIs.  After age 24: Get screened for STIs if you're at risk. You are at risk for STIs (including HIV) if:  You are sexually active with more than one person.  You don't use condoms every time.  You or a partner was diagnosed with a sexually transmitted infection.  If you are at risk for HIV, ask about PrEP medicine to prevent HIV.  Get tested for HIV at least once in your life, whether you are at risk for HIV or not.  Cancer screening tests  Cervical cancer screening: If you have a cervix, begin getting regular cervical cancer screening tests starting at age 21.  Breast cancer scan (mammogram): If you've ever had breasts, begin having regular mammograms starting at age 40. This is a scan to check for breast cancer.  Colon cancer screening: It is important to start screening for colon cancer at age 45.  Have a colonoscopy test every 10 years (or more often if you're at risk) Or, ask your provider about stool tests like a FIT test every year or Cologuard test every 3 years.  To learn more about your testing options, visit:   .  For help making a decision, visit:   https://bit.ly/zp19035.  Prostate cancer screening test: If you have a prostate, ask your care team if a prostate cancer screening test (PSA) at age 55 is right for you.  Lung cancer screening: If you are a current or former smoker ages 50 to 80, ask your care team if ongoing lung cancer screenings are right for you.  For informational purposes only. Not to replace the advice of your health care provider. Copyright   2023 [facility]. All rights reserved. Clinically reviewed by the [facility]. link bird [id number] - REV 01/24.  Learning About Stress  What is stress?     Stress is your body's response to a hard situation. Your body can have a physical, emotional, or mental response. Stress is a fact of life for most people, and it  affects everyone differently. What causes stress for you may not be stressful for someone else.  A lot of things can cause stress. You may feel stress when you go on a job interview, take a test, or run a race. This kind of short-term stress is normal and even useful. It can help you if you need to work hard or react quickly. For example, stress can help you finish an important job on time.  Long-term stress is caused by ongoing stressful situations or events. Examples of long-term stress include long-term health problems, ongoing problems at work, or conflicts in your family. Long-term stress can harm your health.  How does stress affect your health?  When you are stressed, your body responds as though you are in danger. It makes hormones that speed up your heart, make you breathe faster, and give you a burst of energy. This is called the fight-or-flight stress response. If the stress is over quickly, your body goes back to normal and no harm is done.  But if stress happens too often or lasts too long, it can have bad effects. Long-term stress can make you more likely to get sick, and it can make symptoms of some diseases worse. If you tense up when you are stressed, you may develop neck, shoulder, or low back pain. Stress is linked to high blood pressure and heart disease.  Stress also harms your emotional health. It can make you ta, tense, or depressed. Your relationships may suffer, and you may not do well at work or school.  What can you do to manage stress?  You can try these things to help manage stress:   Do something active. Exercise or activity can help reduce stress. Walking is a great way to get started. Even everyday activities such as housecleaning or yard work can help.  Try yoga or josh chi. These techniques combine exercise and meditation. You may need some training at first to learn them.  Do something you enjoy. For example, listen to music or go to a movie. Practice your hobby or do volunteer  "work.  Meditate. This can help you relax, because you are not worrying about what happened before or what may happen in the future.  Do guided imagery. Imagine yourself in any setting that helps you feel calm. You can use online videos, books, or a teacher to guide you.  Do breathing exercises. For example:  From a standing position, bend forward from the waist with your knees slightly bent. Let your arms dangle close to the floor.  Breathe in slowly and deeply as you return to a standing position. Roll up slowly and lift your head last.  Hold your breath for just a few seconds in the standing position.  Breathe out slowly and bend forward from the waist.  Let your feelings out. Talk, laugh, cry, and express anger when you need to. Talking with supportive friends or family, a counselor, or a emiliano leader about your feelings is a healthy way to relieve stress. Avoid discussing your feelings with people who make you feel worse.  Write. It may help to write about things that are bothering you. This helps you find out how much stress you feel and what is causing it. When you know this, you can find better ways to cope.  What can you do to prevent stress?  You might try some of these things to help prevent stress:  Manage your time. This helps you find time to do the things you want and need to do.  Get enough sleep. Your body recovers from the stresses of the day while you are sleeping.  Get support. Your family, friends, and community can make a difference in how you experience stress.  Limit your news feed. Avoid or limit time on social media or news that may make you feel stressed.  Do something active. Exercise or activity can help reduce stress. Walking is a great way to get started.  Where can you learn more?  Go to https://www.[employer].net/patiented  Enter N032 in the search box to learn more about \"Learning About Stress.\"  Current as of: October 24, 2024  Content Version: 14.5 2024-2025 [employer].   Care instructions adapted under license by your healthcare professional. If you have questions about a medical condition or this instruction, always ask your healthcare professional. [employer] disclaims any warranty or liability for your use of this information.    Substance Use Disorder: Care Instructions  Overview     You can improve your life and health by stopping your use of alcohol or drugs. When you don't drink or use drugs, you may feel and sleep better. You may get along better with your family, friends, and coworkers. There are medicines and programs that can help with substance use disorder.  How can you care for yourself at home?  Here are some ways to help you stay sober and prevent relapse.  If you have been given medicine to help keep you sober or reduce your cravings, be sure to take it exactly as prescribed.  Talk to your doctor about programs that can help you stop using drugs or drinking alcohol.  Do not keep alcohol or drugs in your home.  Plan ahead. Think about what you'll say if other people ask you to drink or use drugs. Try not to spend time with people who drink or use drugs.  Use the time and money spent on drinking or drugs to do something that's important to you.  Preventing a relapse  Have a plan to deal with relapse. Learn to recognize changes in your thinking that lead you to drink or use drugs. Get help before you start to drink or use drugs again.  Try to stay away from situations, friends, or places that may lead you to drink or use drugs.  If you feel the need to drink alcohol or use drugs again, seek help right away. Call a trusted friend or family member. Some people get support from organizations such as Narcotics Anonymous or [employer] or from treatment facilities.  If you relapse, get help as soon as you can. Some people make a plan with another person that outlines what they want that person to do for them if they relapse. The plan usually includes  how to handle the relapse and who to notify in case of relapse.  Don't give up. Remember that a relapse doesn't mean that you have failed. Use the experience to learn the triggers that lead you to drink or use drugs. Then quit again. Recovery is a lifelong process. Many people have several relapses before they are able to quit for good.  Follow-up care is a key part of your treatment and safety. Be sure to make and go to all appointments, and call your doctor if you are having problems. It's also a good idea to know your test results and keep a list of the medicines you take.  When should you call for help?   Call 911  anytime you think you may need emergency care. For example, call if you or someone else:    Has overdosed or has withdrawal signs. Be sure to tell the emergency workers that you are or someone else is using or trying to quit using drugs. Overdose or withdrawal signs may include:  Losing consciousness.  Seizure.  Seeing or hearing things that aren't there (hallucinations).     Is thinking or talking about suicide or harming others.   Where to get help 24 hours a day, 7 days a week   If you or someone you know talks about suicide, self-harm, a mental health crisis, a substance use crisis, or any other kind of emotional distress, get help right away. You can:    Call the Suicide and Crisis Lifeline at 453.     Call 7-914-903-TALK (1-197.539.8045).     Text HOME to 683815 to access the Crisis Text Line.   Consider saving these numbers in your phone.  Go to [employer] for more information or to chat online.  Call your doctor now or seek immediate medical care if:    You are having withdrawal symptoms. These may include nausea or vomiting, sweating, shakiness, and anxiety.   Watch closely for changes in your health, and be sure to contact your doctor if:    You have a relapse.     You need more help or support to stop.   Where can you learn more?  Go to https://www.healthwise.net/patiented  Enter H573  "in the search box to learn more about \"Substance Use Disorder: Care Instructions.\"  Current as of: August 20, 2024  Content Version: 14.5 2024-2025 [employer].   Care instructions adapted under license by your healthcare professional. If you have questions about a medical condition or this instruction, always ask your healthcare professional. [employer] disclaims any warranty or liability for your use of this information.    Eating Healthy Foods: Care Instructions  With every meal, you can make healthy food choices. Try to eat a variety of fruits, vegetables, whole grains, lean proteins, and low-fat dairy products. This can help you get the right balance of nutrients, including vitamins and minerals. Small changes add up over time. You can start by adding one healthy food to your meals each day.    Try to make half your plate fruits and vegetables, one-fourth whole grains, and one-fourth lean proteins. Try including dairy with your meals.   Eat more fruits and vegetables. Try to have them with most meals and snacks.   Foods for healthy eating        Fruits   These can be fresh, frozen, canned, or dried.  Try to choose whole fruit rather than fruit juice.  Eat a variety of colors.        Vegetables   These can be fresh, frozen, canned, or dried.  Beans, peas, and lentils count too.        Whole grains   Choose whole-grain breads, cereals, and noodles.  Try brown rice.        Lean proteins   These can include lean meat, poultry, fish, and eggs.  You can also have tofu, beans, peas, lentils, nuts, and seeds.        Dairy   Try milk, yogurt, and cheese.  Choose low-fat or fat-free when you can.  If you need to, use lactose-free milk or fortified plant-based milk products, such as soy milk.        Water   Drink water when you're thirsty.  Limit sugar-sweetened drinks, including soda, fruit drinks, and sports drinks.  Where can you learn more?  Go to https://www.[employer].net/patiented  Enter " "T756 in the search box to learn more about \"Eating Healthy Foods: Care Instructions.\"  Current as of: October 7, 2024  Content Version: 14.5 2024-2025 [employer].   Care instructions adapted under license by your healthcare professional. If you have questions about a medical condition or this instruction, always ask your healthcare professional. [employer] disclaims any warranty or liability for your use of this information.    Learning About Being Physically Active  What is physical activity?     Being physically active means doing any kind of activity that gets your body moving.  The types of physical activity that can help you get fit and stay healthy include:  Aerobic or \"cardio\" activities. These make your heart beat faster and make you breathe harder, such as brisk walking, riding a bike, or running. They strengthen your heart and lungs and build up your endurance.  Strength training activities. These make your muscles work against, or \"resist,\" something. Examples include lifting weights or doing push-ups. These activities help tone and strengthen your muscles and bones.  Stretches. These let you move your joints and muscles through their full range of motion. Stretching helps you be more flexible.  Reaching a balance between these three types of physical activity is important because each one contributes to your overall fitness.  What are the benefits of being active?  Being active is one of the best things you can do for your health. It helps you to:  Feel stronger and have more energy to do all the things you like to do.  Focus better at school or work.  Feel, think, and sleep better.  Reach and stay at a healthy weight.  Lose fat and build lean muscle.  Lower your risk for serious health problems, including diabetes, heart attack, high blood pressure, and some cancers.  Keep your heart, lungs, bones, muscles, and joints strong and healthy.  How can you make being active part of " "your life?  Start slowly. Make it your long-term goal to get at least 30 minutes of exercise on most days of the week. Walking is a good choice. You also may want to do other activities, such as running, swimming, cycling, or playing tennis or team sports.  Pick activities that you like--ones that make your heart beat faster, your muscles stronger, and your muscles and joints more flexible. If you find more than one thing you like doing, do them all. You don't have to do the same thing every day.  Get your heart pumping every day. Any activity that makes your heart beat faster and keeps it at that rate for a while counts.  Here are some great ways to get your heart beating faster:  Go for a brisk walk, run, or hike.  Go for a swim or bike ride.  Take an online exercise class or dance.  Play a game of touch football, basketball, or soccer.  Play tennis, pickleball, or racquetball.  Climb stairs.  Even some household chores can be aerobic. Just do them at a faster pace. Raking or mowing the lawn, sweeping the garage, and vacuuming and cleaning your home all can help get your heart rate up.  Strengthen your muscles during the week. You don't have to lift heavy weights or grow big, bulky muscles to get stronger. Doing a few simple activities that make your muscles work against, or \"resist,\" something can help you get stronger. Aim for at least twice a week.  For example, you can:  Do push-ups or sit-ups, which use your own body weight as resistance.  Lift weights or dumbbells or use stretch bands at home or in a gym or community center.  Stretch your muscles often. Stretching will help you as you become more active. It can help you stay flexible and loosen tight muscles. It can also help improve your balance and posture and can be a great way to relax.  Be sure to stretch the muscles you'll be using when you work out. It's best to warm your muscles slightly before you stretch them. Walk or do some other light aerobic " "activity for a few minutes. Then start stretching.  When you stretch your muscles:  Do it slowly. Stretching is not about going fast or making sudden movements.  Don't push or bounce during a stretch.  Hold each stretch for at least 15 to 30 seconds, if you can. You should feel a stretch in the muscle, but not pain.  Breathe out as you do the stretch. Then breathe in as you hold the stretch. Don't hold your breath.  If you're worried about how more activity might affect your health, have a checkup before you start. Follow any special advice your doctor gives you for getting a smart start.  Where can you learn more?  Go to https://www.[employer].net/patiented  Enter W332 in the search box to learn more about \"Learning About Being Physically Active.\"  Current as of: July 31, 2024  Content Version: 14.5 2024-2025 [employer].   Care instructions adapted under license by your healthcare professional. If you have questions about a medical condition or this instruction, always ask your healthcare professional. [employer] disclaims any warranty or liability for your use of this information.       "

## 2025-07-17 NOTE — PROGRESS NOTES
Preventive Care Visit  [facility]  Cary Martinez Mai, MD, Family Medicine  Jul 17, 2025      Assessment & Plan     (Z00.00) Routine general medical examination at a health care facility  (primary encounter diagnosis)  Comment: Overall Salvador is doing well - his chronic medical conditions are controlled/stable.  He declined all vaccinations today - including COVID, hep B, shingle, and Pneumovax.  Risks and benefit discussed and he is willing to take the risks discussed.  Discussed about safety issue, healthy diet, exercising, good sleeping hygiene, advanced directive care, falling prevention, and depression prevention. Advance directive care discussed and given; he was encouraged to get it updated at his earliest convenience.  He was recommended to take it easy with positional change to prevent falling.  Hallways in the house should always be well lighted and clear of objects. Recommended daily exercise for at least 30 minutes, more tolerated.  Healthy diet with adequate fluid intake and resting discussed and encouraged.  No safety or mental health concerns - lives with neck in order.  Follow in 1 year for physical, earlier as needed.  Labs as ordered and results reviewed.  All of her questions were answered.      Prostate cancer screening:  Discussed with him about the pros and cons of prostate screening cancer with PSA.  Also educated about the potential false positive which may require unnecessary work-up.  He was informed of negative/normal PSA leve does not rule out prostate cancer completely although it is very unlikely.  He understands and is willing to take the risk.      Colon cancer screening: UTD - last Cologuard couple years ago was negative/normal, his was cleared for 3 years.  He denies of having concerned symptoms.       Skin cancer prevention: Above the waist skin exams showed no concerning lesion. Offered below the waist skin exam today but he declined.  He was encouraged to  be generous with sunblock and sun protective gears when she is outside for a long period of time.  He was also encouraged to monitor and follow-up if noted any skin lesion with changes in size, shape, or color.  Tick bite prevention and monitoring also discussed and encouraged     Lung cancer screening: Up-to-date    Plan: PSA, screen            (I10) Hypertension, unspecified type  Comment:  BP is normal and stable with the Hyzaar, amlodipine and metoprolol.  Has a history of stroke, high cholesterol, diabetes, and tobacco smoking - the goal for his blood pressure to be less than 130/80.  Tolerating the medications well.  Kidney function today was normal.  Healthy/low salt diet, daily excercise and weight management discussed and encouraged.  Will continue with the current medications, follow up in 6 month, earlier as needed.     Plan: Basic metabolic panel  (Ca, Cl, CO2, Creat,         Gluc, K, Na, BUN)            (E78.5) Hyperlipidemia LDL goal <70  Comment: Also has diabetes, high blood pressure and history of TIA. Been a heavy smoker for many years and is currently smoking half a pack per day. The goal for his LDL is to be less than 70. No history of liver disease and denies of excessive alcohol intake. His liver enzymes 6 months ago was normal. Cholesterol level today was also normal with LDL 72. Will continue with the Lipitor. Exercising, healthy diet and weight management discussed and encouraged as above. Recheck the cholesterol level and liver enzymes in a year.       (E11.9) Type 2 diabetes mellitus without complication, without long-term current use of insulin (H)  Comment:  Been controlled with hgb A1c hsa been stable around 6.4.  Also has high blood pressure, hyperlipidemia and history of TIA with a long history of heavy tobacco smoking.  The goal for his hemoglobin A1c is to be less than 7.0.  Tolerating the medications well.  Will continue with the metformin, aspirin, Hyzaar and Lipitor.  Encouraged  to continue working on healthy diet, exercising and weight management.   Kidney function  and electrolyte today were normal.   Last eye exam was over a year ago and therefore he was encouraged to get one done at his earliest convenience and at least annually.  Encourage regular and daily foot care to monitor for unintended open wound or infection.  Recommended COVID, shingle, pneumococcal and hep B vaccinations but he declined; he is willing to take the risks. F/U in 6 months, earlier as needed.    Plan: Albumin Random Urine Quantitative with Creat         Ratio, Lipid panel reflex to direct LDL         Non-fasting, HEMOGLOBIN A1C, FOOT EXAM           (J42) Chronic bronchitis, unspecified chronic bronchitis type (H)  Comment:  Known to have COPD and is still smoking up to half a pack a day -not ready to quit.  Overall doing well, not using the Spiriva and has been using the rescue inhaler rarely.  He has no concern about it.  Symptoms that need to be seen or call in discussed.  In the meantime, continue with the rescue inhaler and/for nebulizer as needed. Maintenance inhaler - Spiriva  - to be used daily as needed during the active seasons. I discussed about options for maintaining inhaler with Advair or Symbicort which can be cheaper than the Spiriva but he declined.      (F17.200) Tobacco use disorder  Comment:  Been smoking for years - much heavier up to 2 ppd for years.  Currently smoking about  1/2 ppd - not ready to quit.  Also has COPD, diabetes, TIA, and high blood pressure.  Discussed about the long and short term consequences of tobacco smoking.  Discussed about options for smoking cessation aids; not ready to quit at this time and is willing to take the risk.  Encourage to let me know when he is ready to quit.  In a mean time, I encourage him to reduce to amount of cigarrets smoke as much as possible.  Follow-up low dose CT scan as recommended by the pulmonary nodule clinic. All of his questions were  answered.      (Z86.73) History of TIA (transient ischemic attack) and stroke  Comment:  Overall doing well, no residual effect.   Blood pressure, diabetes and cholesterol have been controlled.  He is strongly encouraged to stop smoking as above.  Emphasized on the importance of controlling all modifiable risks.  Continue with the Lipitor and aspirin - long term treatment.  Encouraged to exercise at least 5 days a week       (N52.9) Erectile dysfunction, unspecified erectile dysfunction type  Comment: Likely due chronic medical condition including tobacco smoking, diabetes, high blood pressure and high cholesterol.  Currently stable and controlled with tadalafil use - no side effects.  Cialis was refilled today to be taken as needed.  No contraindication for Cialis - not on nitroglycerin or prostate medication.  He was instructed to go to the emergency room if develop painful erection erection to last more than 54 hours.      The longitudinal plan of care for the diagnosis(es)/condition(s) as documented were addressed during this visit. Due to the added complexity in care, I will continue to support Salvador in the subsequent management and with ongoing continuity of care.      Patient has been advised of split billing requirements and indicates understanding: Yes    Counseling  Appropriate preventive services were addressed with this patient via screening, questionnaire, or discussion as appropriate for fall prevention, nutrition, physical activity, Tobacco-use cessation, social engagement, weight loss and cognition.  Checklist reviewing preventive services available has been given to the patient.  Reviewed patient's diet, addressing concerns and/or questions.   The patient was instructed to see the dentist every 6 months.   He is at risk for psychosocial distress and has been provided with information to reduce risk.   Reviewed preventive health counseling, as reflected in patient instructions       Regular  exercise       Healthy diet/nutrition       Vision screening       Hearing screening       Immunizations  Patient declined some/all vaccines today.  COVID, Pneumovax, hepatitis B, and shingle           Alcohol Use        Safe sex practices/STD prevention       Colorectal cancer screening       Consider prostate cancer screening (age 55-69)       Consider lung cancer screening for ages 55-80 years (77 for Medicare) and 20 pack-year smoking history        Advance Care Planning    Follow-up    Follow-up Visit   Expected date:  Jul 17, 2026 (Approximate)      Follow Up Appointment Details:     Follow-up with whom?: PCP    Follow-Up for what?: Adult Preventive    How?: In Person                 Subjective   Salvador is a 54 year old, presenting for the following:  Physical (DIABETIC CHECK)        7/17/2025     8:12 AM   Additional Questions   Roomed by Bre   Accompanied by girlfriend taiwo BARILLAS      Salvador is here today for physical and general follow-up.     1.  First is to follow up on DM, HTN and hyperlipidemia.  Overall he is doing well, been taking the medications as prescribed with no side effects.  He takes Metformin for DM, amlodipine, Hyzaar and metoprolol for HTN and Lipitor for high cholesterol.  Tolerating the medications well.  Not checking BP but his blood sugar has been running around 100-150 fasting, no hypoglycemic or hypotensive symptoms.  Been working on eating healthier diet.  Although he does not exercise regularly, he has been walking up to 6-7 miles a day with his job as a  at the local [employer]  No headache, dizziness, or acute visual change.   Last eye exam was over a year ago and was told it was normal.  No chest pain or sob.  No numbness or tingling sensation on feet or fingers.  No leg swelling, dyspnea or orthopnea.  No other concern.     2.  He has a history of TIA multiple times.    Been taking the Lipitor, aspirin as prescribed.  Blood pressure and diabetes have been  controlled.  Still smokes 1/2 pack a day, not ready to quit.  No headache or dizziness.  No focal weakness or problems with speech. No double vision vision or blurred vision.     3.    Cialis works ok for his erectile dysfunction; better than the Viagra.  He had trouble with both initiating and maintaining.  Not on nitro or any medication for prostate.  No side effect from the Cialis.      4.  Otherwise, he is doing well. No major medical care or procedure done since the last physical about a year ago. No N/V/D/C or problem with urination.  No abdominal pain.  Denied of STD risks.  No leg swelling, orthopnea or dyspnea.  No muscle or joint pain.  Walking about 6-7 miles a day as a  for a local [employer]. Social drinker but no drug use.  No safety or mental health concern.  No weight change.  Has chronic insomnia for most of his life and has learned to live with it.  Trazodone did not help.  No other concern today      Advance Care Planning      Discussed advance care planning with patient; however, patient declined at this time.        7/17/2025   General Health   How would you rate your overall physical health? (!) FAIR   Feel stress (tense, anxious, or unable to sleep) Rather much         7/17/2025   Nutrition   Three or more servings of calcium each day? (!) I DON'T KNOW   Diet: Diabetic   How many servings of fruit and vegetables per day? (!) 2-3   How many sweetened beverages each day? (!) 4+         7/17/2025   Exercise   Days per week of moderate/strenous exercise 6 days   Average minutes spent exercising at this level 60 min         7/17/2025   Social Factors   Frequency of gathering with friends or relatives Twice a week         7/17/2025   Dental   Dentist two times every year? (!) NO           Today's PHQ-2 Score:       7/17/2025     8:21 AM   PHQ-2 ( 1999 [employer])   Q1: Little interest or pleasure in doing things 1   Q2: Feeling down, depressed or hopeless 0   PHQ-2 Score 1         8/28/2024    Substance Use   Alcohol more than 3/day or more than 7/wk No   Do you use any other substances recreationally? (!) CANNABIS PRODUCTS     Social History     Tobacco Use    Smoking status: Every Day     Current packs/day: 0.50     Average packs/day: 1 pack/day for 45.5 years (44.3 ttl pk-yrs)     Types: Cigarettes     Start date:     Smokeless tobacco: Never   Vaping Use    Vaping status: Never Used   Substance Use Topics    Alcohol use: Not Currently    Drug use: Yes     Types: Marijuana     Comment: daily - 2021       Last PSA:   PSA   Date Value Ref Range Status   2021 0.73 0 - 4 ug/L Final     Comment:     Assay Method:  Chemiluminescence using Siemens Vista analyzer     Prostate Specific Antigen Screen   Date Value Ref Range Status   2024 0.71 0.00 - 3.50 ng/mL Final     ASCVD Risk   The ASCVD Risk score (Roma SULTANA, et al., 2019) failed to calculate for the following reasons:    Risk score cannot be calculated because patient has a medical history suggesting prior/existing ASCVD    Fracture Risk Assessment Tool  Link to Frax Calculator  Use the information below to complete the Frax calculator  : 1970  Sex: male  Weight (kg): 87.2 kg (actual weight)  Height (cm): 188.6 cm  Previous Fragility Fracture:  No  History of parent with fractured hip:  No  Current Smoking:  Yes  Patient has been on glucocorticoids for more than 3 months (5mg/day or more): No  Rheumatoid Arthritis on Problem List:  No  Secondary Osteoporosis on Problem List:  No  Consumes 3 or more units of alcohol per day: No  Femoral Neck BMD (g/cm2)           Reviewed and updated as needed this visit by Provider   Tobacco  Allergies  Meds  Problems  Med Hx  Surg Hx  Fam Hx  Soc   Hx Sexual Activity          Past Medical History:   Diagnosis Date    Cerebral artery occlusion with cerebral infarction (H)     three TIA    Heart disease     enlarged heart    Hypertension      Past Surgical History:    Procedure Laterality Date    NO HISTORY OF SURGERY       BP Readings from Last 3 Encounters:   25 (!) 136/90   25 122/76   25 120/78    Wt Readings from Last 3 Encounters:   25 87.2 kg (192 lb 3.2 oz)   25 86.9 kg (191 lb 9.6 oz)   25 89 kg (196 lb 3.2 oz)                  Patient Active Problem List   Diagnosis    Hypertension, unspecified type    History of TIA (transient ischemic attack) and stroke    Tobacco use disorder    Erectile dysfunction, unspecified erectile dysfunction type    Type 2 diabetes mellitus without complication, without long-term current use of insulin (H)    Vaccination refused by patient    Psychophysiological insomnia    Hyperlipidemia LDL goal <70    Abnormal CT lung screening    COPD (chronic obstructive pulmonary disease) (H)     Past Surgical History:   Procedure Laterality Date    NO HISTORY OF SURGERY         Social History     Tobacco Use    Smoking status: Every Day     Current packs/day: 0.50     Average packs/day: 1 pack/day for 45.5 years (44.3 ttl pk-yrs)     Types: Cigarettes     Start date:     Smokeless tobacco: Never   Substance Use Topics    Alcohol use: Not Currently     Family History   Problem Relation Age of Onset    Unknown/Adopted Mother     Unknown/Adopted Father     Unknown/Adopted Maternal Grandmother     Unknown/Adopted Maternal Grandfather     Unknown/Adopted Paternal Grandmother     Unknown/Adopted Paternal Grandfather     Aneurysm Brother          at 16    No Known Problems Sister     No Known Problems Son     No Known Problems Son     No Known Problems Daughter     Glaucoma No family hx of     Macular Degeneration No family hx of          Current Outpatient Medications   Medication Sig Dispense Refill    albuterol (PROAIR HFA/PROVENTIL HFA/VENTOLIN HFA) 108 (90 Base) MCG/ACT inhaler Inhale 2 puffs into the lungs every 4 hours as needed for shortness of breath, wheezing or cough. 18 g 3    alcohol swab prep pads  Use to swab area of injection/lakeisha as directed 100 each 3    amLODIPine (NORVASC) 5 MG tablet Take 1 tablet (5 mg) by mouth daily. 90 tablet 2    aspirin (EQ ASPIRIN ADULT LOW DOSE) 81 MG EC tablet Take 1 tablet (81 mg) by mouth daily. 90 tablet 1    atorvastatin (LIPITOR) 20 MG tablet Take 1 tablet (20 mg) by mouth daily. 90 tablet 2    blood glucose (ACCU-CHEK GUIDE) test strip Use to test blood sugar 2 times daily or as directed. 100 strip 1    blood glucose calibration (NO BRAND SPECIFIED) solution Use to calibrate blood glucose monitor as needed as directed. To accompany: Blood Glucose Monitor Brands: per insurance. 1 each 3    ibuprofen (ADVIL/MOTRIN) 800 MG tablet Take 1 tablet (800 mg) by mouth every 8 hours as needed for moderate pain 90 tablet 3    ipratropium - albuterol 0.5 mg/2.5 mg/3 mL (DUONEB) 0.5-2.5 (3) MG/3ML neb solution Take 1 vial (3 mLs) by nebulization every 6 hours as needed for shortness of breath, wheezing or cough. 30 mL 1    losartan-hydrochlorothiazide (HYZAAR) 100-25 MG tablet Take 1 tablet by mouth daily. 90 tablet 0    metFORMIN (GLUCOPHAGE XR) 500 MG 24 hr tablet Take 2 tablets (1,000 mg) by mouth 2 times daily (with meals). 360 tablet 2    metoprolol succinate ER (TOPROL XL) 25 MG 24 hr tablet Take 1 tablet (25 mg) by mouth daily. 90 tablet 2    tadalafil (ADCIRCA/CIALIS) 20 MG tablet Take 1 tablet (20 mg) by mouth every 24 hours. 15 tablet 11    thin (NO BRAND SPECIFIED) lancets Use to test blood sugar 1-2 times daily or as directed. To accompany: Blood Glucose Monitor Brands: per insurance. 100 each 3    tiotropium (SPIRIVA RESPIMAT) 2.5 MCG/ACT inhaler INHALE 2 SPRAY(S) BY MOUTH ONCE DAILY 4 g 11     No Known Allergies  Recent Labs   Lab Test 07/17/25  0911 01/31/25  1115 08/28/24  1041 04/17/24  1108 11/15/23  1618 05/01/23  0940 01/11/22  1200 09/28/21  1041 07/09/21  1429 05/03/21  1307   A1C 6.3* 6.4* 6.2* 6.4*   < > 6.7*   < > 9.0*  --   --    LDL 72  --   --  83  --  62    "< >  --  81  --    HDL 30*  --   --  31*  --  27*   < >  --  35*  --    TRIG 120  --   --  183*  --  318*   < >  --  182*  --    ALT  --  16 20 25   < > 29   < >  --  56  --    CR 0.87 0.81 1.07 0.82   < > 0.79   < > 0.83 0.95 0.89   GFRESTIMATED >90 >90 82 >90   < > >90   < > >90 >90 >90   GFRESTBLACK  --   --   --   --   --   --   --   --  >90 >90   POTASSIUM 3.7 3.6 3.9 3.4   < > 3.5   < > 3.5 3.0* 3.4   TSH  --   --   --   --   --   --   --  0.74  --   --     < > = values in this interval not displayed.          Review of Systems  Constitutional, HEENT, cardiovascular, pulmonary, GI, , musculoskeletal, neuro, skin, endocrine and psych systems are negative, except as otherwise noted.     Objective    Exam  BP (!) 136/90   Pulse 85   Temp 97.7  F (36.5  C) (Temporal)   Ht 1.886 m (6' 2.25\")   Wt 87.2 kg (192 lb 3.2 oz)   SpO2 98%   BMI 24.51 kg/m     Estimated body mass index is 24.51 kg/m  as calculated from the following:    Height as of this encounter: 1.886 m (6' 2.25\").    Weight as of this encounter: 87.2 kg (192 lb 3.2 oz).    Physical Exam  GENERAL: healthy, alert and no distress, speaking full sentences.  EYES: Eyes grossly normal to inspection, PERRL and conjunctivae and sclerae normal.  No nystagmus.  All 4 visual fields are intact  HENT: Ear canals and TM's normal.  Nares are non-congested. Oropharynx is pink and moist. No tender with palpation to the sinuses.  NECK: no adenopathy, supple, no lymphadenopathy or thyromegaly.  No tender with palpation to the cervical spine or its paraspinous muscle.  No JV distention or carotid bruits  RESP: lungs clear to auscultation - no rales, rhonchi or wheezes.  Good respiratory effort throughout  CV: regular rate and rhythm, no murmur.  ABDOMEN: soft, nondistended, nontender, no palpable masses or organomegaly with normal bowel sounds.  MS: no gross musculoskeletal defects noted, no edema.  Walk with no limping, normal gait.  All 4 extremities are equally " in strength. Ankle, knees, hips, shoulders, elbows and wrists exams normal.  Normal fine motor skills on fingers.  Back is straight, no lordosis or scoliosis.  No tender with palpation to the spine.  SKIN: Above the waist the skin exam showed no suspicious lesions or rashes.  Offered and recommended below the waist skin exam but he declined.    NEURO: Normal strength and tone, mentation intact and speech normal.  Cranial nerves II through XII intact, DTR +2 throughout, no focal neurological deficit.  PSYCH: mentation appears normal, affect normal/bright.  Thoughts intact, no hallucination.  No suicidal or homicidal ideation.  LYMPH: no cervical, supraclavicular or axillary adenopathy.   (male): Offered but declined.  Has no concern about it  Foot: Normal fine microfilament exam.  Skin intact, no calluses or open wound.  Normal healthy looking nails.          Results for orders placed or performed in visit on 07/17/25   Albumin Random Urine Quantitative with Creat Ratio     Status: None   Result Value Ref Range    Creatinine Urine mg/dL 199.8 mg/dL    Albumin Urine mg/L 23.2 mg/L    Albumin Urine mg/g Cr 11.61 0.00 - 17.00 mg/g Cr   Lipid panel reflex to direct LDL Non-fasting     Status: Abnormal   Result Value Ref Range    Cholesterol 126 <200 mg/dL    Triglycerides 120 <150 mg/dL    Direct Measure HDL 30 (L) >=40 mg/dL    LDL Cholesterol Calculated 72 <100 mg/dL    Non HDL Cholesterol 96 <130 mg/dL    Patient Fasting > 8hrs? Yes     Narrative    Cholesterol  Desirable: < 200 mg/dL  Borderline High: 200 - 239 mg/dL  High: >= 240 mg/dL    Triglycerides  Normal: < 150 mg/dL  Borderline High: 150 - 199 mg/dL  High: 200-499 mg/dL  Very High: >= 500 mg/dL    Direct Measure HDL  Female: >= 50 mg/dL   Male: >= 40 mg/dL    LDL Cholesterol  Desirable: < 100 mg/dL  Above Desirable: 100 - 129 mg/dL   Borderline High: 130 - 159 mg/dL   High:  160 - 189 mg/dL   Very High: >= 190 mg/dL    Non HDL Cholesterol  Desirable: < 130  mg/dL  Above Desirable: 130 - 159 mg/dL  Borderline High: 160 - 189 mg/dL  High: 190 - 219 mg/dL  Very High: >= 220 mg/dL   HEMOGLOBIN A1C     Status: Abnormal   Result Value Ref Range    Estimated Average Glucose 134 (H) <117 mg/dL    Hemoglobin A1C 6.3 (H) <5.7 %   PSA, screen     Status: Normal   Result Value Ref Range    Prostate Specific Antigen Screen 1.09 0.00 - 3.50 ng/mL    Narrative    This result is obtained using the Roche Elecsys total PSA method on the zulema e601 immunoassay analyzer, which is an ultrasensitive method. Results obtained with different assay methods or kits cannot be used interchangeably.  This test is intended for initial prostate cancer screening. PSA values exceeding the age-specific limits are suspicious for prostate disease, but additional testing, such as prostate biopsy, is needed to diagnose prostate pathology. The American Cancer Society recommends annual examination with digital rectal examination and serum PSA beginning at age 50 and for men with a life expectancy of at least 10 years after detection of prostate cancer. For men in high-risk groups, such as  Americans or men with a first-degree relative diagnosed at a younger age, testing should begin at a younger age. It is generally recommended that information be provided to patients about the benefits and limitations of testing and treatment so they can make informed decisions.   Basic metabolic panel  (Ca, Cl, CO2, Creat, Gluc, K, Na, BUN)     Status: Abnormal   Result Value Ref Range    Sodium 141 135 - 145 mmol/L    Potassium 3.7 3.4 - 5.3 mmol/L    Chloride 101 98 - 107 mmol/L    Carbon Dioxide (CO2) 28 22 - 29 mmol/L    Anion Gap 12 7 - 15 mmol/L    Urea Nitrogen 22.8 (H) 6.0 - 20.0 mg/dL    Creatinine 0.87 0.67 - 1.17 mg/dL    GFR Estimate >90 >60 mL/min/1.73m2    Calcium 9.7 8.8 - 10.4 mg/dL    Glucose 128 (H) 70 - 99 mg/dL    Patient Fasting > 8hrs? Yes    Results for orders placed or performed during the  hospital encounter of 07/17/25   CT Chest Low Dose Non Contrast     Status: None    Narrative    EXAM: CT CHEST LOW DOSE NON CONTRAST  LOCATION: [facility]  DATE: 7/17/2025    INDICATION: Lung-RADS category 0: Follow-up imaging.    COMPARISON: CT 5/6/2025.    TECHNIQUE: CT chest without intravenous contrast. Multiplanar reformats were obtained. Dose reduction techniques were used.    CONTRAST: None.    FINDINGS:   LUNGS AND PLEURA: No effusions. Previously noted patchy ill-defined pulmonary opacities show resolution. No convincing new acute air-space disease. Emphysematous changes. Calcified benign granuloma at the right middle lobe again seen.    MEDIASTINUM/AXILLAE: No adenopathy or acute mediastinal abnormality.    CORONARY ARTERY CALCIFICATION: Mild.    UPPER ABDOMEN: Cholelithiasis. No acute upper abdominal abnormality.    MUSCULOSKELETAL: Normal.      Impression    IMPRESSION:   1.  Previously noted patchy pulmonary opacities have resolved. No acute abnormality. From a lung screening perspective, this is now considered Lung-RADS category 1: Negative. Recommend return to annual CT lung screening as needed.  2.  Cholelithiasis.          Signed Electronically by: Cary Martinez Mai, MD